# Patient Record
Sex: FEMALE | Race: WHITE | NOT HISPANIC OR LATINO | Employment: FULL TIME | URBAN - METROPOLITAN AREA
[De-identification: names, ages, dates, MRNs, and addresses within clinical notes are randomized per-mention and may not be internally consistent; named-entity substitution may affect disease eponyms.]

---

## 2020-03-19 ENCOUNTER — TELEPHONE (OUTPATIENT)
Dept: OBGYN CLINIC | Facility: CLINIC | Age: 30
End: 2020-03-19

## 2020-03-19 ENCOUNTER — OFFICE VISIT (OUTPATIENT)
Dept: OBGYN CLINIC | Facility: CLINIC | Age: 30
End: 2020-03-19
Payer: COMMERCIAL

## 2020-03-19 VITALS
DIASTOLIC BLOOD PRESSURE: 74 MMHG | HEIGHT: 62 IN | SYSTOLIC BLOOD PRESSURE: 114 MMHG | BODY MASS INDEX: 21.9 KG/M2 | HEART RATE: 82 BPM | WEIGHT: 119 LBS

## 2020-03-19 DIAGNOSIS — Z01.419 ENCNTR FOR GYN EXAM (GENERAL) (ROUTINE) W/O ABN FINDINGS: Primary | ICD-10-CM

## 2020-03-19 DIAGNOSIS — Z30.09 ENCOUNTER FOR COUNSELING REGARDING CONTRACEPTION: ICD-10-CM

## 2020-03-19 PROCEDURE — G0145 SCR C/V CYTO,THINLAYER,RESCR: HCPCS | Performed by: NURSE PRACTITIONER

## 2020-03-19 PROCEDURE — S0610 ANNUAL GYNECOLOGICAL EXAMINA: HCPCS | Performed by: NURSE PRACTITIONER

## 2020-03-19 NOTE — PATIENT INSTRUCTIONS
ap every 3 years if normal-done, STI testing as indicated-decliend, exercise most days of week, obtain appropriate diet and hydration, Calcium 1000mg + 600 vit D daily, birth control options discussed  She will meet with Dr Ko Monday to discuss permanent sterilization   (ACHES reviewed)  Benefits, risks and alternatives discussed/reviewed  Condom use when sexually active for sexually transmitted infection prevention  HPV 9 vaccine recommended through age 39  Check with your insurance for coverage  If covered, call office to schedule start of vaccine series    Annual mammogram starting at age 36, monthly breast self exam

## 2020-03-19 NOTE — TELEPHONE ENCOUNTER
Telephone Note:     Left message on machine for [facility] office  Left message advising of new visitor policy allowing NO support persons for appointment

## 2020-03-19 NOTE — PROGRESS NOTES
Assessment/Plan:     Pap every 3 years if normal-done, STI testing as indicated-decliend, exercise most days of week, obtain appropriate diet and hydration, Calcium 1000mg + 600 vit D daily, birth control options discussed  She will meet with Dr Rafa Bowie (she has also researched all providers)  to discuss permanent sterilization   (ACHES reviewed)  Benefits, risks and alternatives discussed/reviewed  Condom use when sexually active for sexually transmitted infection prevention  HPV 9 vaccine recommended through age 39  Check with your insurance for coverage  If covered, call office to schedule start of vaccine series  Annual mammogram starting at age 36, monthly breast self exam           States slightly down with Covid quarantine  Diagnoses and all orders for this visit:    Encntr for gyn exam (general) (routine) w/o abn findings  -     Liquid-based pap, screening    Encounter for counseling regarding contraception              Subjective:      Patient ID: Eduard Way is a 30 y o  female  Samantha Francisco is a 30 y o  female who is here today for her annual visit/first gynecologic exam  No health concerns and desires to discuss birth control options  States "my whole life I have never wanted kids " She has researched all birth control methods and desires permanent sterilization  States the only way a artem would be Mr Blake Brown is if he says "he only wants to be with me and doesn't want children "  Monthly menses x 5 days with light to mod flow  Menses is acceptable  Not currently exercising  Works FT at a [employer], so walks frequently  Samantha Francisco is not currently sexually active  Admtis to always condom use  Menarche 16         The following portions of the patient's history were reviewed and updated as appropriate: allergies, current medications, past family history, past medical history, past social history, past surgical history and problem list     Review of Systems Constitutional: Negative  Negative for activity change, appetite change, chills, diaphoresis, fatigue, fever and unexpected weight change  HENT: Negative for congestion, dental problem, sneezing, sore throat and trouble swallowing  Eyes: Negative for visual disturbance  Respiratory: Negative for chest tightness and shortness of breath  Cardiovascular: Negative for chest pain and leg swelling  Gastrointestinal: Negative for abdominal pain, constipation, diarrhea, nausea and vomiting  Genitourinary: Negative for difficulty urinating, dyspareunia, dysuria, frequency, hematuria, menstrual problem, pelvic pain, urgency, vaginal bleeding, vaginal discharge and vaginal pain  Musculoskeletal: Negative for back pain and neck pain  Skin: Negative  Allergic/Immunologic: Negative  Neurological: Negative for weakness and headaches  Hematological: Negative for adenopathy  Psychiatric/Behavioral: Negative  Objective:      /74 (BP Location: Left arm, Patient Position: Sitting, Cuff Size: Standard)   Pulse 82   Ht 5' 1 5" (1 562 m)   Wt 54 kg (119 lb)   LMP 03/04/2020   BMI 22 12 kg/m²          Physical Exam   Constitutional: She is oriented to person, place, and time  Vital signs are normal  She appears well-developed and well-nourished  HENT:   Head: Normocephalic and atraumatic  Eyes: Right eye exhibits no discharge  Left eye exhibits no discharge  Neck: Trachea normal and normal range of motion  Neck supple  No thyromegaly present  Cardiovascular: Normal rate, regular rhythm, normal heart sounds and intact distal pulses  Pulmonary/Chest: Effort normal and breath sounds normal  Right breast exhibits no inverted nipple, no mass, no nipple discharge, no skin change and no tenderness  Left breast exhibits no inverted nipple, no mass, no nipple discharge, no skin change and no tenderness  No breast tenderness, discharge or bleeding   Breasts are symmetrical    Abdominal: Soft  Normal appearance  Genitourinary: Vagina normal and uterus normal  Rectal exam shows no external hemorrhoid  No breast tenderness, discharge or bleeding  Pelvic exam was performed with patient supine  No labial fusion  There is no rash, tenderness, lesion or injury on the right labia  There is no rash, tenderness, lesion or injury on the left labia  Cervix exhibits no motion tenderness, no discharge and no friability  Right adnexum displays no mass, no tenderness and no fullness  Left adnexum displays no mass, no tenderness and no fullness  No erythema, tenderness or bleeding in the vagina  No foreign body in the vagina  No signs of injury around the vagina  No vaginal discharge found  Genitourinary Comments: Moderate cervical eversion   Musculoskeletal: Normal range of motion  Lymphadenopathy:        Head (right side): No submental, no submandibular and no tonsillar adenopathy present  Head (left side): No submental, no submandibular and no tonsillar adenopathy present  She has no cervical adenopathy  She has no axillary adenopathy  No inguinal adenopathy noted on the right or left side  Right: No inguinal adenopathy present  Left: No inguinal adenopathy present  Neurological: She is alert and oriented to person, place, and time  Skin: Skin is warm and dry  Psychiatric: She has a normal mood and affect  Nursing note and vitals reviewed

## 2020-03-24 LAB
LAB AP GYN PRIMARY INTERPRETATION: NORMAL
Lab: NORMAL

## 2020-07-16 ENCOUNTER — OFFICE VISIT (OUTPATIENT)
Dept: OBGYN CLINIC | Facility: CLINIC | Age: 30
End: 2020-07-16
Payer: COMMERCIAL

## 2020-07-16 VITALS
WEIGHT: 116.8 LBS | DIASTOLIC BLOOD PRESSURE: 62 MMHG | BODY MASS INDEX: 21.49 KG/M2 | TEMPERATURE: 98.8 F | SYSTOLIC BLOOD PRESSURE: 114 MMHG | HEIGHT: 62 IN

## 2020-07-16 DIAGNOSIS — Z30.09 ENCOUNTER FOR COUNSELING REGARDING CONTRACEPTION: ICD-10-CM

## 2020-07-16 DIAGNOSIS — Z30.09 CONSULTATION FOR FEMALE STERILIZATION: Primary | ICD-10-CM

## 2020-07-16 PROBLEM — Z01.419 ENCNTR FOR GYN EXAM (GENERAL) (ROUTINE) W/O ABN FINDINGS: Status: RESOLVED | Noted: 2020-03-19 | Resolved: 2020-07-16

## 2020-07-16 PROCEDURE — 99214 OFFICE O/P EST MOD 30 MIN: CPT | Performed by: OBSTETRICS & GYNECOLOGY

## 2020-07-16 NOTE — PROGRESS NOTES
Assessment/Plan    Patient has expressed desire for contraception  She does not desire childbearing and would like permanent sterilization  She understands that this is a permanent, irreversible procedure  Future pregnancy would have to be via IVF after this procedure if desired  Will message surgery scheduler to schedule  Plan for laparoscopic bilateral salpingectomy  Briefly discussed surgery, day of, postop recovery, etc  All questions answered  Will return for preop visit  Total face to face time of this appointment was 25 minutes, with >50% of the time spent counseling the patient on treatment options and follow up plan  Subjective      Samantha Cintron is a 30 y o  female who presents for contraception counseling  The patient has no complaints today  The patient is sexually active  Pertinent past medical history: none  The patient strongly desires permanent sterilization and is not interested in discussing alternatives today  Menstrual History:  OB History        0    Para   0    Term   0       0    AB   0    Living   0       SAB   0    TAB   0    Ectopic   0    Multiple   0    Live Births   0                Patient's last menstrual period was 2020 (exact date)  History reviewed  No pertinent past medical history      Past Surgical History:   Procedure Laterality Date    WISDOM TOOTH EXTRACTION           Family History   Problem Relation Age of Onset    Hypertension Mother     Endometrial cancer Mother     Hyperlipidemia Mother     Hypertension Father     Autism Brother     Other Paternal Grandmother         lymes    No Known Problems Paternal Grandfather     No Known Problems Half-Brother     No Known Problems Half-Brother        Social History     Socioeconomic History    Marital status: Single     Spouse name: Not on file    Number of children: Not on file    Years of education: 15    Highest education level: Not on file   Occupational History    Occupation: [employer]   Social Needs    Financial resource strain: Not on file    Food insecurity:     Worry: Not on file     Inability: Not on file   [place] needs:     Medical: Not on file     Non-medical: Not on file   Tobacco Use    Smoking status: Never Smoker    Smokeless tobacco: Never Used   Substance and Sexual Activity    Alcohol use: Yes     Drinks per session: 1 or 2     Binge frequency: Monthly     Comment: occasionally     Drug use: Never    Sexual activity: Not Currently     Partners: Male     Birth control/protection: None   Lifestyle    Physical activity:     Days per week: Not on file     Minutes per session: Not on file    Stress: Not on file   Relationships    Social connections:     Talks on phone: Not on file     Gets together: Not on file     Attends Oriental orthodox service: Not on file     Active member of club or organization: Not on file     Attends meetings of clubs or organizations: Not on file     Relationship status: Not on file    Intimate partner violence:     Fear of current or ex partner: Not on file     Emotionally abused: Not on file     Physically abused: Not on file     Forced sexual activity: Not on file   Other Topics Concern    Not on file   Social History Narrative    Most recent tobacco use screening: 10-    Do you currently or have you served in [employer]: No    Were you activated, into active duty, as a member of the [employer] or as a Reservist: No    Occupation: [employer] [place] [id number]    Education: 15    student college    Marital status: Single    Sexual orientation: Heterosexual    Exercise level: Occasional    Diet: Regular    General stress level: Medium    Alcohol intake: Occasional    Caffeine intake: Occasional    Chewing tobacco: none    Illicit drugs: none    Guns present in home: No    Seat belts used routinely: Yes    Sunscreen used routinely: No    Smoke alarm in home: Yes    Advance directive: No    Salt Intake: moderate    Has the Patient had a mammogram to screen for breast cancer within 24 months: No    Would the patient like to s       Allergies/Medications  No Known Allergies  No current outpatient medications on file  Review of Systems  Denies fevers, chills, shortness of breath, chest pain, abdominal pain, nausea, vomiting, pelvic pain, abnormal vaginal bleeding, abnormal vaginal discharge  Objective      /62   Temp 98 8 °F (37 1 °C)   Ht 5' 1 5" (1 562 m)   Wt 53 kg (116 lb 12 8 oz)   LMP 06/05/2020 (Exact Date)   BMI 21 71 kg/m²     GEN: The patient was alert and oriented x3, pleasant well-appearing female in no acute distress     PULM: nonlabored respirations  MSK: Normal gait  Skin: warm, dry  Neuro: no focal deficits  Psych: normal affect and judgement, cooperative

## 2020-07-28 ENCOUNTER — TELEPHONE (OUTPATIENT)
Dept: OBGYN CLINIC | Facility: CLINIC | Age: 30
End: 2020-07-28

## 2020-07-28 NOTE — TELEPHONE ENCOUNTER
----- Message from Abbey Pugh MA sent at 7/28/2020 10:05 AM EDT -----  Regarding: surgery dates   Left VM for pt to call office back to discuss surgery dates    Can offer Tues 09/01/2020 MAIN 9:45am    following Dr Rosaline Hong other case (hysteroscopy)   Needs pre op scheduled if agreeable     ----- Message -----  From: Justin Alfaro DO  Sent: 7/16/2020   8:19 PM EDT  To: Abbey Pugh MA    [facility],    This patient desires tubal ligation  Can you please peek at the schedule and call her with available times? Would prefer ASC  If we're going into October with dates I'd like to see if I can add her on outside of our block time  Thanks!     Tonia Taylor

## 2020-07-28 NOTE — TELEPHONE ENCOUNTER
Called pts insurance to see if pt needs prior auth for surgery on 09/01/20   Using code  (B/L salpingectomy)        NO PA REQUIRED   Call Ref # [id number], 07/28/20, Agustin Spencer

## 2020-08-21 ENCOUNTER — OFFICE VISIT (OUTPATIENT)
Dept: OBGYN CLINIC | Facility: CLINIC | Age: 30
End: 2020-08-21
Payer: COMMERCIAL

## 2020-08-21 VITALS
BODY MASS INDEX: 21.64 KG/M2 | DIASTOLIC BLOOD PRESSURE: 76 MMHG | SYSTOLIC BLOOD PRESSURE: 112 MMHG | TEMPERATURE: 98 F | WEIGHT: 116.4 LBS

## 2020-08-21 DIAGNOSIS — Z30.09 STERILIZATION CONSULT: ICD-10-CM

## 2020-08-21 DIAGNOSIS — Z01.818 PREOP EXAMINATION: Primary | ICD-10-CM

## 2020-08-21 PROCEDURE — 99213 OFFICE O/P EST LOW 20 MIN: CPT | Performed by: OBSTETRICS & GYNECOLOGY

## 2020-08-21 NOTE — PROGRESS NOTES
Assessment Samantha was seen today for pre-op exam     Diagnoses and all orders for this visit:    Preop examination    Sterilization consult         Plan    Samantha Franecs is scheduled for laparoscopic bilateral salpingectomy on 2020  Pre-op instructions, including showering with Hibiclens, discussed with patient  The risks, benefits and alternatives to the procedure were discussed  We discussed the risks of pain, bleeding, blood clot, infection, allergic reaction, neurovascular injury, injury to uterus, injury to surrounding structures such as bowel, bladder and/or ureters, and possibility of inability to complete the procedure  We discussed code status - full code  Blood transfusion is acceptable  We discussed resident physician participation in the procedure, including pelvic exam   All questions answered, consent obtained  Subjective     Samantha Frances is a 35 y o  female here for a pre-op consultation  She is scheduled for sterilization  She has no questions today  Patient Active Problem List   Diagnosis   (none) - all problems resolved or deleted         Gynecologic History  No LMP recorded  Obstetric History  OB History    Para Term  AB Living   0 0 0 0 0 0   SAB TAB Ectopic Multiple Live Births   0 0 0 0 0       Past Medical/Surgical/Family/Social History    No past medical history on file    Past Surgical History:   Procedure Laterality Date    WISDOM TOOTH EXTRACTION       Family History   Problem Relation Age of Onset    Hypertension Mother     Endometrial cancer Mother     Hyperlipidemia Mother     Hypertension Father     Autism Brother     Other Paternal Grandmother         lymes    No Known Problems Paternal Grandfather     No Known Problems Half-Brother     No Known Problems Half-Brother      Social History     Socioeconomic History    Marital status: Single     Spouse name: Not on file    Number of children: Not on file    Years of education: 15    Highest education level: Not on file   Occupational History    Occupation: [employer]   Social [employer]    Financial resource strain: Not on file    Food insecurity     Worry: Not on file     Inability: Not on file   [employer] needs     Medical: Not on file     Non-medical: Not on file   Tobacco Use    Smoking status: Never Smoker    Smokeless tobacco: Never Used   Substance and Sexual Activity    Alcohol use: Yes     Drinks per session: 1 or 2     Binge frequency: Monthly     Comment: occasionally     Drug use: Never    Sexual activity: Not Currently     Partners: Male     Birth control/protection: None   Lifestyle    Physical activity     Days per week: Not on file     Minutes per session: Not on file    Stress: Not on file   Relationships    Social connections     Talks on phone: Not on file     Gets together: Not on file     Attends Faith service: Not on file     Active member of club or organization: Not on file     Attends meetings of clubs or organizations: Not on file     Relationship status: Not on file    Intimate partner violence     Fear of current or ex partner: Not on file     Emotionally abused: Not on file     Physically abused: Not on file     Forced sexual activity: Not on file   Other Topics Concern    Not on file   Social History Narrative    Most recent tobacco use screening: 10-    Do you currently or have you served in [employer]: No    Were you activated, into active duty, as a member of the [employer] or as a Reservist: No    Occupation: [employer] [place] [id number] [place]    Education: 15    student college    Marital status: Single    Sexual orientation: Heterosexual    Exercise level: Occasional    Diet: Regular    General stress level: Medium    Alcohol intake: Occasional    Caffeine intake: Occasional    Chewing tobacco: none    Illicit drugs: none    Guns present in home: No    Seat belts used routinely: Yes    Sunscreen used routinely: No    Smoke alarm in home:  Yes Advance directive: No    Salt Intake: moderate    Has the Patient had a mammogram to screen for breast cancer within 24 months: No    Would the patient like to s         Patient has no known allergies  No current outpatient medications on file  Review of Systems  Constitutional: no fever, feels well, no tiredness, no recent weight gain or loss  Gastrointestinal: no complaints of abdominal pain, constipation, nausea, vomiting, or diarrhea or bloody stools  Genitourinary : no complaints of dysuria, incontinence, pelvic pain, dysmenorrhea,vaginal discharge or abnormal vaginal bleeding       Objective     /76   Temp 98 °F (36 7 °C)   Wt 52 8 kg (116 lb 6 4 oz)   BMI 21 64 kg/m²     GEN: The patient was alert and oriented x3, pleasant well-appearing female in no acute distress     CV: Regular rate and rhythm  PULM: nonlabored respirations, CTAB  MSK: Normal gait  Skin: warm, dry  Neuro: no focal deficits  Psych: normal affect and judgement, cooperative

## 2020-08-21 NOTE — H&P (VIEW-ONLY)
Assessment Samantha was seen today for pre-op exam     Diagnoses and all orders for this visit:    Preop examination    Sterilization consult         Plan    Samantha Ramos is scheduled for laparoscopic bilateral salpingectomy on 2020  Pre-op instructions, including showering with Hibiclens, discussed with patient  The risks, benefits and alternatives to the procedure were discussed  We discussed the risks of pain, bleeding, blood clot, infection, allergic reaction, neurovascular injury, injury to uterus, injury to surrounding structures such as bowel, bladder and/or ureters, and possibility of inability to complete the procedure  We discussed code status - full code  Blood transfusion is acceptable  We discussed resident physician participation in the procedure, including pelvic exam   All questions answered, consent obtained  Subjective     Samantha Ramos is a 35 y o  female here for a pre-op consultation  She is scheduled for sterilization  She has no questions today  Patient Active Problem List   Diagnosis   (none) - all problems resolved or deleted         Gynecologic History  No LMP recorded  Obstetric History  OB History    Para Term  AB Living   0 0 0 0 0 0   SAB TAB Ectopic Multiple Live Births   0 0 0 0 0       Past Medical/Surgical/Family/Social History    No past medical history on file    Past Surgical History:   Procedure Laterality Date    WISDOM TOOTH EXTRACTION       Family History   Problem Relation Age of Onset    Hypertension Mother     Endometrial cancer Mother     Hyperlipidemia Mother     Hypertension Father     Autism Brother     Other Paternal Grandmother         lymes    No Known Problems Paternal Grandfather     No Known Problems Half-Brother     No Known Problems Half-Brother      Social History     Socioeconomic History    Marital status: Single     Spouse name: Not on file    Number of children: Not on file    Years of education: 15    Highest education level: Not on file   Occupational History    Occupation: [employer]   Social [employer]    Financial resource strain: Not on file    Food insecurity     Worry: Not on file     Inability: Not on file   [employer] needs     Medical: Not on file     Non-medical: Not on file   Tobacco Use    Smoking status: Never Smoker    Smokeless tobacco: Never Used   Substance and Sexual Activity    Alcohol use: Yes     Drinks per session: 1 or 2     Binge frequency: Monthly     Comment: occasionally     Drug use: Never    Sexual activity: Not Currently     Partners: Male     Birth control/protection: None   Lifestyle    Physical activity     Days per week: Not on file     Minutes per session: Not on file    Stress: Not on file   Relationships    Social connections     Talks on phone: Not on file     Gets together: Not on file     Attends Orthodoxy service: Not on file     Active member of club or organization: Not on file     Attends meetings of clubs or organizations: Not on file     Relationship status: Not on file    Intimate partner violence     Fear of current or ex partner: Not on file     Emotionally abused: Not on file     Physically abused: Not on file     Forced sexual activity: Not on file   Other Topics Concern    Not on file   Social History Narrative    Most recent tobacco use screening: 10-    Do you currently or have you served in [employer]: No    Were you activated, into active duty, as a member of the [employer] or as a Reservist: No    Occupation: [employer] [place] [id number] [place]    Education: 15    student college    Marital status: Single    Sexual orientation: Heterosexual    Exercise level: Occasional    Diet: Regular    General stress level: Medium    Alcohol intake: Occasional    Caffeine intake: Occasional    Chewing tobacco: none    Illicit drugs: none    Guns present in home: No    Seat belts used routinely: Yes    Sunscreen used routinely: No    Smoke alarm in home:  Yes Advance directive: No    Salt Intake: moderate    Has the Patient had a mammogram to screen for breast cancer within 24 months: No    Would the patient like to s         Patient has no known allergies  No current outpatient medications on file  Review of Systems  Constitutional: no fever, feels well, no tiredness, no recent weight gain or loss  Gastrointestinal: no complaints of abdominal pain, constipation, nausea, vomiting, or diarrhea or bloody stools  Genitourinary : no complaints of dysuria, incontinence, pelvic pain, dysmenorrhea,vaginal discharge or abnormal vaginal bleeding       Objective     /76   Temp 98 °F (36 7 °C)   Wt 52 8 kg (116 lb 6 4 oz)   BMI 21 64 kg/m²     GEN: The patient was alert and oriented x3, pleasant well-appearing female in no acute distress     CV: Regular rate and rhythm  PULM: nonlabored respirations, CTAB  MSK: Normal gait  Skin: warm, dry  Neuro: no focal deficits  Psych: normal affect and judgement, cooperative

## 2020-08-28 ENCOUNTER — APPOINTMENT (OUTPATIENT)
Dept: LAB | Facility: CLINIC | Age: 30
End: 2020-08-28
Payer: COMMERCIAL

## 2020-08-28 DIAGNOSIS — Z30.2 ENCOUNTER FOR STERILIZATION: ICD-10-CM

## 2020-08-28 LAB
ERYTHROCYTE [DISTWIDTH] IN BLOOD BY AUTOMATED COUNT: 14.1 % (ref 11.6–15.1)
HCT VFR BLD AUTO: 38.9 % (ref 34.8–46.1)
HGB BLD-MCNC: 11.7 G/DL (ref 11.5–15.4)
MCH RBC QN AUTO: 26.6 PG (ref 26.8–34.3)
MCHC RBC AUTO-ENTMCNC: 30.1 G/DL (ref 31.4–37.4)
MCV RBC AUTO: 88 FL (ref 82–98)
PLATELET # BLD AUTO: 265 THOUSANDS/UL (ref 149–390)
PMV BLD AUTO: 11.8 FL (ref 8.9–12.7)
RBC # BLD AUTO: 4.4 MILLION/UL (ref 3.81–5.12)
WBC # BLD AUTO: 5.6 THOUSAND/UL (ref 4.31–10.16)

## 2020-08-28 PROCEDURE — 36415 COLL VENOUS BLD VENIPUNCTURE: CPT

## 2020-08-28 PROCEDURE — 85027 COMPLETE CBC AUTOMATED: CPT

## 2020-08-31 ENCOUNTER — ANESTHESIA EVENT (OUTPATIENT)
Dept: PERIOP | Facility: HOSPITAL | Age: 30
End: 2020-08-31
Payer: COMMERCIAL

## 2020-08-31 NOTE — ANESTHESIA PREPROCEDURE EVALUATION
Procedure:  LAPAROSCOPIC SALPINGECTOMY (Bilateral Abdomen)    Relevant Problems   No relevant active problems        Physical Exam    Airway    Mallampati score: III  TM Distance: >3 FB  Neck ROM: full     Dental   No notable dental hx     Cardiovascular      Pulmonary      Other Findings        Anesthesia Plan  ASA Score- 1     Anesthesia Type- general with ASA Monitors  Additional Monitors:   Airway Plan: ETT  Plan Factors-Exercise tolerance (METS): >4 METS  Chart reviewed  Existing labs reviewed  Patient summary reviewed  Induction- intravenous  Postoperative Plan- Plan for postoperative opioid use  Informed Consent- Anesthetic plan and risks discussed with patient  I personally reviewed this patient with the CRNA  Discussed and agreed on the Anesthesia Plan with the CRNA  Sushila Cardoza

## 2020-08-31 NOTE — PRE-PROCEDURE INSTRUCTIONS
No outpatient medications have been marked as taking for the 9/1/20 encounter [facility] Encounter)  Pre op and bathing instructions reviewed   Pt will use antibacterial soap DOS

## 2020-09-01 ENCOUNTER — HOSPITAL ENCOUNTER (OUTPATIENT)
Facility: HOSPITAL | Age: 30
Setting detail: OUTPATIENT SURGERY
Discharge: HOME | End: 2020-09-01
Attending: OBSTETRICS & GYNECOLOGY | Admitting: OBSTETRICS & GYNECOLOGY
Payer: COMMERCIAL

## 2020-09-01 ENCOUNTER — ANESTHESIA (OUTPATIENT)
Dept: PERIOP | Facility: HOSPITAL | Age: 30
End: 2020-09-01
Payer: COMMERCIAL

## 2020-09-01 VITALS
RESPIRATION RATE: 18 BRPM | BODY MASS INDEX: 21.14 KG/M2 | OXYGEN SATURATION: 100 % | HEART RATE: 74 BPM | WEIGHT: 112 LBS | TEMPERATURE: 97.5 F | SYSTOLIC BLOOD PRESSURE: 110 MMHG | HEIGHT: 61 IN | DIASTOLIC BLOOD PRESSURE: 75 MMHG

## 2020-09-01 DIAGNOSIS — Z30.2 ENCOUNTER FOR STERILIZATION: ICD-10-CM

## 2020-09-01 DIAGNOSIS — Z98.890 S/P LAPAROSCOPY: Primary | ICD-10-CM

## 2020-09-01 LAB
EXT PREGNANCY TEST URINE: NEGATIVE
EXT. CONTROL: NORMAL
GLUCOSE SERPL-MCNC: 97 MG/DL (ref 65–140)

## 2020-09-01 PROCEDURE — 82948 REAGENT STRIP/BLOOD GLUCOSE: CPT

## 2020-09-01 PROCEDURE — 81025 URINE PREGNANCY TEST: CPT | Performed by: OBSTETRICS & GYNECOLOGY

## 2020-09-01 PROCEDURE — 88302 TISSUE EXAM BY PATHOLOGIST: CPT | Performed by: PATHOLOGY

## 2020-09-01 PROCEDURE — 58661 LAPAROSCOPY REMOVE ADNEXA: CPT | Performed by: OBSTETRICS & GYNECOLOGY

## 2020-09-01 RX ORDER — NEOSTIGMINE METHYLSULFATE 1 MG/ML
INJECTION INTRAVENOUS AS NEEDED
Status: DISCONTINUED | OUTPATIENT
Start: 2020-09-01 | End: 2020-09-01

## 2020-09-01 RX ORDER — GLYCOPYRROLATE 0.2 MG/ML
INJECTION INTRAMUSCULAR; INTRAVENOUS AS NEEDED
Status: DISCONTINUED | OUTPATIENT
Start: 2020-09-01 | End: 2020-09-01

## 2020-09-01 RX ORDER — LIDOCAINE HYDROCHLORIDE 10 MG/ML
INJECTION, SOLUTION EPIDURAL; INFILTRATION; INTRACAUDAL; PERINEURAL AS NEEDED
Status: DISCONTINUED | OUTPATIENT
Start: 2020-09-01 | End: 2020-09-01

## 2020-09-01 RX ORDER — DEXAMETHASONE SODIUM PHOSPHATE 10 MG/ML
INJECTION, SOLUTION INTRAMUSCULAR; INTRAVENOUS AS NEEDED
Status: DISCONTINUED | OUTPATIENT
Start: 2020-09-01 | End: 2020-09-01

## 2020-09-01 RX ORDER — SODIUM CHLORIDE, SODIUM LACTATE, POTASSIUM CHLORIDE, CALCIUM CHLORIDE 600; 310; 30; 20 MG/100ML; MG/100ML; MG/100ML; MG/100ML
20 INJECTION, SOLUTION INTRAVENOUS CONTINUOUS
Status: CANCELLED | OUTPATIENT
Start: 2020-09-01

## 2020-09-01 RX ORDER — ROCURONIUM BROMIDE 10 MG/ML
INJECTION, SOLUTION INTRAVENOUS AS NEEDED
Status: DISCONTINUED | OUTPATIENT
Start: 2020-09-01 | End: 2020-09-01

## 2020-09-01 RX ORDER — OXYCODONE HYDROCHLORIDE 5 MG/1
5 TABLET ORAL EVERY 4 HOURS PRN
Status: DISCONTINUED | OUTPATIENT
Start: 2020-09-01 | End: 2020-09-01 | Stop reason: HOSPADM

## 2020-09-01 RX ORDER — MAGNESIUM HYDROXIDE 1200 MG/15ML
LIQUID ORAL AS NEEDED
Status: DISCONTINUED | OUTPATIENT
Start: 2020-09-01 | End: 2020-09-01 | Stop reason: HOSPADM

## 2020-09-01 RX ORDER — SUCCINYLCHOLINE/SOD CL,ISO/PF 100 MG/5ML
SYRINGE (ML) INTRAVENOUS AS NEEDED
Status: DISCONTINUED | OUTPATIENT
Start: 2020-09-01 | End: 2020-09-01

## 2020-09-01 RX ORDER — IBUPROFEN 600 MG/1
600 TABLET ORAL EVERY 6 HOURS PRN
Qty: 30 TABLET | Refills: 0 | Status: SHIPPED | OUTPATIENT
Start: 2020-09-01 | End: 2021-01-06

## 2020-09-01 RX ORDER — BUPIVACAINE HYDROCHLORIDE 2.5 MG/ML
INJECTION, SOLUTION EPIDURAL; INFILTRATION; INTRACAUDAL AS NEEDED
Status: DISCONTINUED | OUTPATIENT
Start: 2020-09-01 | End: 2020-09-01 | Stop reason: HOSPADM

## 2020-09-01 RX ORDER — HYDROMORPHONE HCL/PF 1 MG/ML
0.2 SYRINGE (ML) INJECTION
Status: DISCONTINUED | OUTPATIENT
Start: 2020-09-01 | End: 2020-09-01 | Stop reason: HOSPADM

## 2020-09-01 RX ORDER — ONDANSETRON 2 MG/ML
4 INJECTION INTRAMUSCULAR; INTRAVENOUS EVERY 6 HOURS PRN
Status: DISCONTINUED | OUTPATIENT
Start: 2020-09-01 | End: 2020-09-01 | Stop reason: HOSPADM

## 2020-09-01 RX ORDER — ACETAMINOPHEN 325 MG/1
650 TABLET ORAL EVERY 6 HOURS PRN
Status: DISCONTINUED | OUTPATIENT
Start: 2020-09-01 | End: 2020-09-01 | Stop reason: HOSPADM

## 2020-09-01 RX ORDER — ONDANSETRON 2 MG/ML
4 INJECTION INTRAMUSCULAR; INTRAVENOUS ONCE AS NEEDED
Status: DISCONTINUED | OUTPATIENT
Start: 2020-09-01 | End: 2020-09-01 | Stop reason: HOSPADM

## 2020-09-01 RX ORDER — IBUPROFEN 600 MG/1
600 TABLET ORAL EVERY 6 HOURS PRN
Status: DISCONTINUED | OUTPATIENT
Start: 2020-09-01 | End: 2020-09-01 | Stop reason: HOSPADM

## 2020-09-01 RX ORDER — SODIUM CHLORIDE, SODIUM LACTATE, POTASSIUM CHLORIDE, CALCIUM CHLORIDE 600; 310; 30; 20 MG/100ML; MG/100ML; MG/100ML; MG/100ML
125 INJECTION, SOLUTION INTRAVENOUS CONTINUOUS
Status: DISCONTINUED | OUTPATIENT
Start: 2020-09-01 | End: 2020-09-01

## 2020-09-01 RX ORDER — OXYCODONE HYDROCHLORIDE AND ACETAMINOPHEN 5; 325 MG/1; MG/1
1 TABLET ORAL EVERY 6 HOURS PRN
Qty: 5 TABLET | Refills: 0 | Status: SHIPPED | OUTPATIENT
Start: 2020-09-01 | End: 2020-09-11

## 2020-09-01 RX ORDER — MIDAZOLAM HYDROCHLORIDE 2 MG/2ML
INJECTION, SOLUTION INTRAMUSCULAR; INTRAVENOUS AS NEEDED
Status: DISCONTINUED | OUTPATIENT
Start: 2020-09-01 | End: 2020-09-01

## 2020-09-01 RX ORDER — ONDANSETRON 2 MG/ML
INJECTION INTRAMUSCULAR; INTRAVENOUS AS NEEDED
Status: DISCONTINUED | OUTPATIENT
Start: 2020-09-01 | End: 2020-09-01

## 2020-09-01 RX ORDER — PROPOFOL 10 MG/ML
INJECTION, EMULSION INTRAVENOUS AS NEEDED
Status: DISCONTINUED | OUTPATIENT
Start: 2020-09-01 | End: 2020-09-01

## 2020-09-01 RX ORDER — SODIUM CHLORIDE, SODIUM LACTATE, POTASSIUM CHLORIDE, CALCIUM CHLORIDE 600; 310; 30; 20 MG/100ML; MG/100ML; MG/100ML; MG/100ML
125 INJECTION, SOLUTION INTRAVENOUS CONTINUOUS
Status: CANCELLED | OUTPATIENT
Start: 2020-09-01

## 2020-09-01 RX ORDER — FENTANYL CITRATE 50 UG/ML
INJECTION, SOLUTION INTRAMUSCULAR; INTRAVENOUS AS NEEDED
Status: DISCONTINUED | OUTPATIENT
Start: 2020-09-01 | End: 2020-09-01

## 2020-09-01 RX ORDER — KETOROLAC TROMETHAMINE 30 MG/ML
INJECTION, SOLUTION INTRAMUSCULAR; INTRAVENOUS AS NEEDED
Status: DISCONTINUED | OUTPATIENT
Start: 2020-09-01 | End: 2020-09-01

## 2020-09-01 RX ADMIN — ONDANSETRON 4 MG: 2 INJECTION INTRAMUSCULAR; INTRAVENOUS at 09:59

## 2020-09-01 RX ADMIN — PROPOFOL 120 MG: 10 INJECTION, EMULSION INTRAVENOUS at 09:39

## 2020-09-01 RX ADMIN — HYDROMORPHONE HYDROCHLORIDE 0.2 MG: 1 INJECTION, SOLUTION INTRAMUSCULAR; INTRAVENOUS; SUBCUTANEOUS at 11:12

## 2020-09-01 RX ADMIN — MIDAZOLAM HYDROCHLORIDE 2 MG: 1 INJECTION, SOLUTION INTRAMUSCULAR; INTRAVENOUS at 09:34

## 2020-09-01 RX ADMIN — SODIUM CHLORIDE, SODIUM LACTATE, POTASSIUM CHLORIDE, AND CALCIUM CHLORIDE: .6; .31; .03; .02 INJECTION, SOLUTION INTRAVENOUS at 10:30

## 2020-09-01 RX ADMIN — NEOSTIGMINE METHYLSULFATE 3 MG: 1 INJECTION INTRAVENOUS at 10:32

## 2020-09-01 RX ADMIN — GLYCOPYRROLATE 0.4 MG: 0.2 INJECTION, SOLUTION INTRAMUSCULAR; INTRAVENOUS at 10:32

## 2020-09-01 RX ADMIN — ROCURONIUM BROMIDE 5 MG: 10 SOLUTION INTRAVENOUS at 09:40

## 2020-09-01 RX ADMIN — Medication 60 MG: at 09:40

## 2020-09-01 RX ADMIN — ROCURONIUM BROMIDE 15 MG: 10 SOLUTION INTRAVENOUS at 10:09

## 2020-09-01 RX ADMIN — ROCURONIUM BROMIDE 20 MG: 10 SOLUTION INTRAVENOUS at 09:49

## 2020-09-01 RX ADMIN — LIDOCAINE HYDROCHLORIDE 50 MG: 10 INJECTION, SOLUTION EPIDURAL; INFILTRATION; INTRACAUDAL; PERINEURAL at 09:39

## 2020-09-01 RX ADMIN — FENTANYL CITRATE 50 MCG: 50 INJECTION INTRAMUSCULAR; INTRAVENOUS at 10:11

## 2020-09-01 RX ADMIN — ACETAMINOPHEN 650 MG: 325 TABLET, FILM COATED ORAL at 12:06

## 2020-09-01 RX ADMIN — FENTANYL CITRATE 50 MCG: 50 INJECTION INTRAMUSCULAR; INTRAVENOUS at 09:39

## 2020-09-01 RX ADMIN — SODIUM CHLORIDE, SODIUM LACTATE, POTASSIUM CHLORIDE, AND CALCIUM CHLORIDE: .6; .31; .03; .02 INJECTION, SOLUTION INTRAVENOUS at 09:33

## 2020-09-01 RX ADMIN — HYDROMORPHONE HYDROCHLORIDE 0.2 MG: 1 INJECTION, SOLUTION INTRAMUSCULAR; INTRAVENOUS; SUBCUTANEOUS at 11:17

## 2020-09-01 RX ADMIN — KETOROLAC TROMETHAMINE 30 MG: 30 INJECTION, SOLUTION INTRAMUSCULAR at 10:32

## 2020-09-01 RX ADMIN — DEXAMETHASONE SODIUM PHOSPHATE 4 MG: 10 INJECTION, SOLUTION INTRAMUSCULAR; INTRAVENOUS at 09:59

## 2020-09-01 RX ADMIN — SUGAMMADEX 102 MG: 100 INJECTION, SOLUTION INTRAVENOUS at 10:48

## 2020-09-01 NOTE — ANESTHESIA POSTPROCEDURE EVALUATION
Post-Op Assessment Note    CV Status:  Stable  Pain Score: 0    Pain management: adequate     Mental Status:  Alert and awake   Hydration Status:  Euvolemic   PONV Controlled:  Controlled   Airway Patency:  Patent      Post Op Vitals Reviewed: Yes      Staff: CRNA, Anesthesiologist         No complications documented      BP   112/73   Temp  97 1   Pulse  90   Resp   12   SpO2 100

## 2020-09-01 NOTE — INTERVAL H&P NOTE
H&P reviewed  After examining the patient I find no changes in the patients condition since the H&P had been written      Vitals:    09/01/20 0849   BP: 123/85   Pulse: 93   Resp: 18   Temp: (!) 97 3 °F (36 3 °C)   SpO2: 99%

## 2020-09-01 NOTE — DISCHARGE INSTRUCTIONS
Salpingectomy   WHAT YOU NEED TO KNOW:   A salpingectomy is surgery to remove one or both of your fallopian tubes  The fallopian tubes carry eggs from the ovaries to the uterus  They are part of a woman's reproductive system  A salpingectomy may be done to treat an ectopic pregnancy, cancer, endometriosis, or an infection  It may also be done to prevent pregnancy or some types of cancer  DISCHARGE INSTRUCTIONS:   Call 911 for any of the following:   · You feel lightheaded, short of breath, and have chest pain  · You cough up blood  · You have trouble breathing  Seek care immediately if:   · Your arm or leg feels warm, tender, and painful  It may look swollen and red  · Blood soaks through your bandage  · Your stitches come apart  · You soak through 1 sanitary pad in 1 hour  · You have trouble urinating or cannot urinate at all  Contact your healthcare provider if:   · You have a fever or chills  · Your wound is red, swollen, or draining pus  · You have pus or a foul-smelling odor coming from your vagina  · Your pain does not get better after you take your medicine  · You have nausea or are vomiting  · Your skin is itchy, swollen, or you have a rash  · You have questions or concerns about your condition or care  Medicines: You may need any of the following:  · Prescription pain medicine  may be given  Ask your healthcare provider how to take this medicine safely  · NSAIDs , such as ibuprofen, help decrease swelling, pain, and fever  NSAIDs can cause stomach bleeding or kidney problems in certain people  If you take blood thinner medicine, always ask your healthcare provider if NSAIDs are safe for you  Always read the medicine label and follow directions  · Take your medicine as directed  Contact your healthcare provider if you think your medicine is not helping or if you have side effects  Tell him or her if you are allergic to any medicine   Keep a list of the medicines, vitamins, and herbs you take  Include the amounts, and when and why you take them  Bring the list or the pill bottles to follow-up visits  Carry your medicine list with you in case of an emergency  Care for your wound as directed:  Ask your healthcare provider when your wound can get wet  Do not take a bath until your healthcare provider says it is okay  Take a shower only  Carefully wash around the wound with soap and water  Let the soap and water gently run over your incision  Do not  scrub your incision  Dry the area and put on new, clean bandages as directed  Change your bandages when they get wet or dirty  If you have strips of medical tape, let them fall off on their own  Activity:  Ask your healthcare provider when you can return to your normal activities  Do not douche, use tampons, or have sex until your healthcare provider says it is okay  These activities may cause infection  Do not exercise or lift anything heavy until your healthcare provider says it is okay  This may put too much stress on your incision  Follow up with your healthcare provider as directed:  Write down your questions so you remember to ask them during your visits  © 2017 [facility] Information is for End User's use only and may not be sold, redistributed or otherwise used for commercial purposes  All illustrations and images included in CareNotes® are the copyrighted property of [employer] , [employer]  or [employer]  The above information is an  only  It is not intended as medical advice for individual conditions or treatments  Talk to your doctor, nurse or pharmacist before following any medical regimen to see if it is safe and effective for you

## 2020-09-01 NOTE — OP NOTE
OPERATIVE REPORT  PATIENT NAME: Rika Hull    :  1990  MRN: [id number]  Pt Location: [facility]    SURGERY DATE: 2020    Surgeon(s) and Role:     * Car Fowler DO - Primary     * Stas Villa MD - Assisting    Preop Diagnosis:  Encounter for sterilization [Z30 2]    Post-Op Diagnosis Codes:     * Encounter for sterilization [Z30 2]    Procedure(s) (LRB):  LAPAROSCOPIC SALPINGECTOMY (Bilateral)    Specimen(s):  ID Type Source Tests Collected by Time Destination   1 : BILATERAL FALLOPIAN TUBES Tissue Fallopian Tubes, Bilateral TISSUE EXAM Car Fowler DO 2020 1011        Estimated Blood Loss:   Minimal    Drains:  [REMOVED] Urethral Catheter Non-latex 16 Fr  (Removed)   Number of days: 0       Anesthesia Type:   General LMA    Operative Indications:  Encounter for sterilization [Z30 2]    Operative Findings:  Normal uterus  Bilateral tubes and ovaries  Cervical ectropion    Complications:   None    Procedure and Technique:  Description of Procedure    Patient was taken to the operating room  General endotracheal anesthesia (GET) was administered and the patient was positioned on the OR table in the dorsal lithotomy position  All pressure points were padded and a everardo hugger was placed to maintain control of core body temperature  A bimanual exam was performed and the uterus was noted to be anteverted, normal in size and consistency with no palpable adnexal masses or fullness  The patient was prepped and draped in the usual sterile fashion with chloroprep on the abdomen and chlorhexidine prep on the vagina and perineum  Operative Technique    A time out was performed to confirm correct patient and correct procedure  A Landin catheter was introduced into the bladder, and was draining clear yellow urine  A weighted speculum was inserted into the vagina and used to visualize the anterior lip of the cervix, which was then grasped with a single toothed tenaculum   A cone uterine manipulator was inserted into the cervix and secured to the tenaculum  The speculum was removed from the vagina  Sterile gloves were then exchanged and attention was turned to the abdomen  A 5mm incision was made at the inferior edge of the umbilicus for introduction of a 5mm trocar  Trocar was introduced under direct visualization  Pneumoperitoneum was then established to a maximum of 15mmHg  The entire abdomen and pelvis was inspected and there was no evidence of injury to bowel, bladder, vasculature, or other structures  Attention was then turned to the pelvis  Patient was placed in Trendelenburg and the uterus was elevated to visualize the fallopian tubes  There was noted to be grossly normal tubes and ovaries bilaterally  Two additional port sites were selected in the left and right lower abdomen approximately 2cm superior and medial to the iliac crests  A 5mm incision was made for introduction of a 5mm trocar under direct visualization at each site  A blunt grasper was inserted through this port and used to visualize the fimbriated ends of the tubes  The right fallopian tube was grasped at its fimbriated end with a blunt grasper and elevated to visualize the mesosalpinx  The Enseal device was used to ligate along the mesosalpinx, working proximally and taking care to avoid ovarian vasculature  Approximately 2cm from the cornua, the Enseal was used to amputate fallopian tube  This was then withdrawn from the abdominal cavity and sent for pathology  Attention was then turned to the contralateral tube, which was amputated in similar fashion  Good hemostasis was confirmed following salpingectomy  Following salpingectomy, pneumoperitoneum was allowed to escape  Adequate hemostasis was visualized  The inferior trocars were removed under direct visualization  The laparoscope was withdrawn from the abdomen, followed by its trocar sleeve at the umbilicus   Skin incisions were closed with running absorbable suture of 4-0 monocryl  Attention was turned to the vagina  A weighted speculum was reinserted into the vagina and the uterine manipulator was withdrawn  Single toothed tenaculum was removed from the anterior lip of the cervix  Good hemostasis was confirmed at the tenaculum puncture sites  Speculum was then removed from the vagina  At the conclusion of the procedure, all needle, sponge, and instrument counts were noted to be correct x2  Patient tolerated the procedure well and was transferred to PACU in stable condition prior to discharge with follow up in 1-2 weeks  Dr Kory Bhatti was present and participated in all key portions of the case      Patient Disposition:  PACU     SIGNATURE: Jeffry Camacho MD  DATE: September 1, 2020  TIME: 10:52 AM

## 2020-10-09 ENCOUNTER — OFFICE VISIT (OUTPATIENT)
Dept: OBGYN CLINIC | Facility: CLINIC | Age: 30
End: 2020-10-09

## 2020-10-09 VITALS
BODY MASS INDEX: 22.3 KG/M2 | TEMPERATURE: 97.8 F | SYSTOLIC BLOOD PRESSURE: 110 MMHG | WEIGHT: 118 LBS | DIASTOLIC BLOOD PRESSURE: 66 MMHG

## 2020-10-09 DIAGNOSIS — Z98.51 S/P TUBAL LIGATION: Primary | ICD-10-CM

## 2020-10-09 PROCEDURE — 99024 POSTOP FOLLOW-UP VISIT: CPT | Performed by: OBSTETRICS & GYNECOLOGY

## 2021-01-06 ENCOUNTER — OFFICE VISIT (OUTPATIENT)
Dept: FAMILY MEDICINE CLINIC | Facility: CLINIC | Age: 31
End: 2021-01-06
Payer: COMMERCIAL

## 2021-01-06 VITALS
BODY MASS INDEX: 22.09 KG/M2 | HEIGHT: 61 IN | HEART RATE: 100 BPM | OXYGEN SATURATION: 99 % | RESPIRATION RATE: 16 BRPM | WEIGHT: 117 LBS | SYSTOLIC BLOOD PRESSURE: 118 MMHG | DIASTOLIC BLOOD PRESSURE: 68 MMHG

## 2021-01-06 DIAGNOSIS — Z13.6 SCREENING FOR CARDIOVASCULAR CONDITION: ICD-10-CM

## 2021-01-06 DIAGNOSIS — G47.00 INSOMNIA, UNSPECIFIED TYPE: Primary | ICD-10-CM

## 2021-01-06 DIAGNOSIS — F41.9 ANXIETY: ICD-10-CM

## 2021-01-06 DIAGNOSIS — Z23 NEEDS FLU SHOT: ICD-10-CM

## 2021-01-06 PROCEDURE — 99214 OFFICE O/P EST MOD 30 MIN: CPT | Performed by: FAMILY MEDICINE

## 2021-01-06 PROCEDURE — 3725F SCREEN DEPRESSION PERFORMED: CPT | Performed by: FAMILY MEDICINE

## 2021-01-06 PROCEDURE — 90686 IIV4 VACC NO PRSV 0.5 ML IM: CPT | Performed by: FAMILY MEDICINE

## 2021-01-06 PROCEDURE — 90471 IMMUNIZATION ADMIN: CPT | Performed by: FAMILY MEDICINE

## 2021-01-06 RX ORDER — BUSPIRONE HYDROCHLORIDE 7.5 MG/1
TABLET ORAL
Qty: 30 TABLET | Refills: 0 | Status: SHIPPED | OUTPATIENT
Start: 2021-01-06 | End: 2021-01-17

## 2021-01-06 NOTE — PROGRESS NOTES
Assessment/Plan:    Problem List Items Addressed This Visit        Other    Screening for cardiovascular condition    Relevant Orders    CBC and differential    Comprehensive metabolic panel    Lipid panel    TSH, 3rd generation with Free T4 reflex    Insomnia - Primary     Will start her on BuSpar just to help reduce anxiety and maybe she be able to sleep better, advised to have follow-up in 1 week         Relevant Medications    busPIRone (BUSPAR) 7 5 mg tablet    Other Relevant Orders    Ambulatory referral to [id number]     Abuse per will start at bedtime and then she can take twice a day if she feels during daytime she still feel anxious and follow-up 1 week         Relevant Medications    busPIRone (BUSPAR) 7 5 mg tablet    Other Relevant Orders    Ambulatory referral to [facility]    Needs flu shot    Relevant Orders    influenza vaccine, quadrivalent, 0 5 mL, preservative-free, for adult and pediatric patients 6 mos+ (AFLURIA, FLUARIX, FLULAVAL, FLUZONE) (Completed)          Chief Complaint   Patient presents with    Insomnia       Subjective:   Patient ID: Angeli Hathaway is a 35 y o  female  She says she has a long history of intermittent anxiety and insomnia, but in last few days is getting worse, she says in the past she was given med try mirtazepine  by her PCP in [place], and she use it for short-term in her anxiety got better but she was feeling more sleepy during daytime then she stop taking this medication currently she is nonsmoker she drinks occasionally alcohol, she has no new stressors, she lives with her parents and brother, she says in last 2 nights she could not sleep at all, and sometimes she wake up in the middle of night and she feels heart racing and shaking but no chest tightness  She is single, she does not want to have children so she got her tubal ligation, and currently she is not sexually active      Review of Systems   Constitutional: Negative  HENT: Negative  Eyes: Negative  Respiratory: Negative  Cardiovascular: Negative  Gastrointestinal: Negative  Musculoskeletal: Negative  Neurological: Negative  Psychiatric/Behavioral: Positive for sleep disturbance  Negative for dysphoric mood, self-injury and suicidal ideas  The patient is nervous/anxious  Objective:  Physical Exam  Vitals signs reviewed  Constitutional:       Appearance: Normal appearance  HENT:      Right Ear: Tympanic membrane normal       Left Ear: Tympanic membrane normal    Eyes:      Extraocular Movements: Extraocular movements intact  Neck:      Musculoskeletal: Normal range of motion  Cardiovascular:      Rate and Rhythm: Normal rate  Heart sounds: No murmur  Abdominal:      General: Abdomen is flat  There is no distension  Palpations: There is no mass  Musculoskeletal: Normal range of motion  Skin:     Coloration: Skin is not jaundiced or pale  Neurological:      General: No focal deficit present  Mental Status: She is alert and oriented to person, place, and time     Psychiatric:         Mood and Affect: Mood normal             Past Surgical History:   Procedure Laterality Date    NC LAP,RMV  ADNEXAL STRUCTURE Bilateral 9/1/2020    Procedure: LAPAROSCOPIC SALPINGECTOMY;  Surgeon: Jess Alcala DO;  [facility];  Service: Gynecology    WISDOM TOOTH EXTRACTION         Family History   Problem Relation Age of Onset    Hypertension Mother     Endometrial cancer Mother     Hyperlipidemia Mother     Hypertension Father     Autism Brother     Other Paternal Grandmother         lymes    No Known Problems Paternal Grandfather     No Known Problems Half-Brother     No Known Problems Half-Brother          Current Outpatient Medications:     busPIRone (BUSPAR) 7 5 mg tablet, 1 po bid, Disp: 30 tablet, Rfl: 0    No Known Allergies    Vitals:    01/06/21 0918   BP: 118/68   Pulse: 100   Resp: 16   SpO2: 99% Weight: 53 1 kg (117 lb)   Height: 5' 1" (1 549 m)

## 2021-01-06 NOTE — ASSESSMENT & PLAN NOTE
Abuse per will start at bedtime and then she can take twice a day if she feels during daytime she still feel anxious and follow-up 1 week

## 2021-01-06 NOTE — ASSESSMENT & PLAN NOTE
Will start her on BuSpar just to help reduce anxiety and maybe she be able to sleep better, advised to have follow-up in 1 week

## 2021-01-14 DIAGNOSIS — F41.9 ANXIETY: ICD-10-CM

## 2021-01-14 DIAGNOSIS — G47.00 INSOMNIA, UNSPECIFIED TYPE: ICD-10-CM

## 2021-01-14 NOTE — TELEPHONE ENCOUNTER
Pt currently has 2 follow ups arlene 1/19/2021 with lj and 1/22/2021 with janes    need to find out who she is following with   [facility]

## 2021-01-17 RX ORDER — BUSPIRONE HYDROCHLORIDE 7.5 MG/1
TABLET ORAL
Qty: 30 TABLET | Refills: 0 | Status: SHIPPED | OUTPATIENT
Start: 2021-01-17 | End: 2021-02-04

## 2021-01-18 ENCOUNTER — TRANSCRIBE ORDERS (OUTPATIENT)
Dept: LAB | Facility: CLINIC | Age: 31
End: 2021-01-18

## 2021-01-18 ENCOUNTER — LAB (OUTPATIENT)
Dept: LAB | Facility: CLINIC | Age: 31
End: 2021-01-18
Payer: COMMERCIAL

## 2021-01-18 DIAGNOSIS — Z13.6 SCREENING FOR CARDIOVASCULAR CONDITION: ICD-10-CM

## 2021-01-18 LAB
ALBUMIN SERPL BCP-MCNC: 4.1 G/DL (ref 3.5–5)
ALP SERPL-CCNC: 59 U/L (ref 46–116)
ALT SERPL W P-5'-P-CCNC: 18 U/L (ref 12–78)
ANION GAP SERPL CALCULATED.3IONS-SCNC: 2 MMOL/L (ref 4–13)
AST SERPL W P-5'-P-CCNC: 21 U/L (ref 5–45)
BASOPHILS # BLD AUTO: 0.05 THOUSANDS/ΜL (ref 0–0.1)
BASOPHILS NFR BLD AUTO: 1 % (ref 0–1)
BILIRUB SERPL-MCNC: 0.39 MG/DL (ref 0.2–1)
BUN SERPL-MCNC: 13 MG/DL (ref 5–25)
CALCIUM SERPL-MCNC: 9.4 MG/DL (ref 8.3–10.1)
CHLORIDE SERPL-SCNC: 104 MMOL/L (ref 100–108)
CHOLEST SERPL-MCNC: 265 MG/DL (ref 50–200)
CO2 SERPL-SCNC: 30 MMOL/L (ref 21–32)
CREAT SERPL-MCNC: 0.68 MG/DL (ref 0.6–1.3)
EOSINOPHIL # BLD AUTO: 0.05 THOUSAND/ΜL (ref 0–0.61)
EOSINOPHIL NFR BLD AUTO: 1 % (ref 0–6)
ERYTHROCYTE [DISTWIDTH] IN BLOOD BY AUTOMATED COUNT: 13.7 % (ref 11.6–15.1)
GFR SERPL CREATININE-BSD FRML MDRD: 119 ML/MIN/1.73SQ M
GLUCOSE P FAST SERPL-MCNC: 97 MG/DL (ref 65–99)
HCT VFR BLD AUTO: 37.2 % (ref 34.8–46.1)
HDLC SERPL-MCNC: 60 MG/DL
HGB BLD-MCNC: 11.2 G/DL (ref 11.5–15.4)
IMM GRANULOCYTES # BLD AUTO: 0.01 THOUSAND/UL (ref 0–0.2)
IMM GRANULOCYTES NFR BLD AUTO: 0 % (ref 0–2)
LDLC SERPL CALC-MCNC: 190 MG/DL (ref 0–100)
LYMPHOCYTES # BLD AUTO: 3.05 THOUSANDS/ΜL (ref 0.6–4.47)
LYMPHOCYTES NFR BLD AUTO: 42 % (ref 14–44)
MCH RBC QN AUTO: 26 PG (ref 26.8–34.3)
MCHC RBC AUTO-ENTMCNC: 30.1 G/DL (ref 31.4–37.4)
MCV RBC AUTO: 86 FL (ref 82–98)
MONOCYTES # BLD AUTO: 0.46 THOUSAND/ΜL (ref 0.17–1.22)
MONOCYTES NFR BLD AUTO: 6 % (ref 4–12)
NEUTROPHILS # BLD AUTO: 3.62 THOUSANDS/ΜL (ref 1.85–7.62)
NEUTS SEG NFR BLD AUTO: 50 % (ref 43–75)
NONHDLC SERPL-MCNC: 205 MG/DL
NRBC BLD AUTO-RTO: 0 /100 WBCS
PLATELET # BLD AUTO: 271 THOUSANDS/UL (ref 149–390)
PMV BLD AUTO: 10.9 FL (ref 8.9–12.7)
POTASSIUM SERPL-SCNC: 4.5 MMOL/L (ref 3.5–5.3)
PROT SERPL-MCNC: 7.5 G/DL (ref 6.4–8.2)
RBC # BLD AUTO: 4.31 MILLION/UL (ref 3.81–5.12)
SODIUM SERPL-SCNC: 136 MMOL/L (ref 136–145)
TRIGL SERPL-MCNC: 75 MG/DL
TSH SERPL DL<=0.05 MIU/L-ACNC: 2.25 UIU/ML (ref 0.36–3.74)
WBC # BLD AUTO: 7.24 THOUSAND/UL (ref 4.31–10.16)

## 2021-01-18 PROCEDURE — 80053 COMPREHEN METABOLIC PANEL: CPT

## 2021-01-18 PROCEDURE — 36415 COLL VENOUS BLD VENIPUNCTURE: CPT

## 2021-01-18 PROCEDURE — 84443 ASSAY THYROID STIM HORMONE: CPT

## 2021-01-18 PROCEDURE — 85025 COMPLETE CBC W/AUTO DIFF WBC: CPT

## 2021-01-18 PROCEDURE — 80061 LIPID PANEL: CPT

## 2021-01-19 ENCOUNTER — OFFICE VISIT (OUTPATIENT)
Dept: FAMILY MEDICINE CLINIC | Facility: CLINIC | Age: 31
End: 2021-01-19
Payer: COMMERCIAL

## 2021-01-19 VITALS
WEIGHT: 118 LBS | TEMPERATURE: 98.6 F | BODY MASS INDEX: 22.28 KG/M2 | SYSTOLIC BLOOD PRESSURE: 120 MMHG | DIASTOLIC BLOOD PRESSURE: 80 MMHG | OXYGEN SATURATION: 99 % | RESPIRATION RATE: 16 BRPM | HEART RATE: 74 BPM | HEIGHT: 61 IN

## 2021-01-19 DIAGNOSIS — D50.9 IRON DEFICIENCY ANEMIA, UNSPECIFIED IRON DEFICIENCY ANEMIA TYPE: ICD-10-CM

## 2021-01-19 DIAGNOSIS — F41.9 ANXIETY: ICD-10-CM

## 2021-01-19 DIAGNOSIS — G47.00 INSOMNIA, UNSPECIFIED TYPE: Primary | ICD-10-CM

## 2021-01-19 DIAGNOSIS — E78.2 MIXED HYPERLIPIDEMIA: ICD-10-CM

## 2021-01-19 PROCEDURE — 3008F BODY MASS INDEX DOCD: CPT | Performed by: FAMILY MEDICINE

## 2021-01-19 PROCEDURE — 99214 OFFICE O/P EST MOD 30 MIN: CPT | Performed by: FAMILY MEDICINE

## 2021-01-19 PROCEDURE — 1036F TOBACCO NON-USER: CPT | Performed by: FAMILY MEDICINE

## 2021-01-20 NOTE — PROGRESS NOTES
Assessment/Plan:    Problem List Items Addressed This Visit        Other    Insomnia - Primary     She is sleeping better with BuSpar as her anxiety level is down and she has no side effects         Anxiety     Continue be spot as needed and she is only using at bedtime, will follow-up if she needs any change in medication now she feels her anxiety and insomnia is not under control         Mixed hyperlipidemia     Lab Results   Component Value Date    LDLCALC 190 (H) 01/18/2021   She has familial hyperlipidemia as mother had the same, discussed with her about medication, and will start with low-fat diet and exercise and repeat labs in 6 month to see if she can improve her cholesterol with lifestyle modification and she agrees instead of starting medication will start with lifestyle modification  She has tubal ligation so there is no contraindication for starting her on statins in the future           Relevant Orders    Lipid panel    Iron deficiency anemia     She has slightly low hemoglobin and she gets her normal periods, her advised to take over-the-counter iron tablet and will recheck labs in 6 months         Relevant Orders    CBC and differential          Chief Complaint   Patient presents with    Follow-up     1 week      Subjective:   Patient ID: Samantha Messer is a 35 y o  female  She is here follow-up on her anxiety and sleep problem she was started on BuSpar she says she only takes at nighttime she feels better sleep and less anxiety, she has not use even in the morning, she did not even use the neck is refill which was sent 2 days ago, she is feeling much better,  She had her labs done  She says her family history mother had hyperlipidemia in her young age    She denies any headache chest pain shortness of breath, but she says she feels little bit tired and feels more cold her periods are normal  She says her tubes have been tied and she has no plans to become pregnant      Review of Systems Constitutional: Negative for activity change, appetite change, chills, fatigue, fever and unexpected weight change  HENT: Negative for congestion, ear discharge, ear pain, nosebleeds, postnasal drip, rhinorrhea, sinus pressure, sneezing, sore throat, trouble swallowing and voice change  Eyes: Negative for photophobia, pain, discharge, redness and itching  Respiratory: Negative for cough, chest tightness, shortness of breath and wheezing  Cardiovascular: Negative for chest pain, palpitations and leg swelling  Gastrointestinal: Negative for abdominal pain, constipation, diarrhea, nausea and vomiting  Endocrine: Negative for polyuria  Genitourinary: Negative for dysuria, frequency and urgency  Musculoskeletal: Negative for arthralgias, back pain, myalgias and neck pain  Skin: Negative for color change, pallor and rash  Allergic/Immunologic: Negative for environmental allergies and food allergies  Neurological: Negative for dizziness, weakness, light-headedness and headaches  Hematological: Negative for adenopathy  Does not bruise/bleed easily  Psychiatric/Behavioral: Negative for behavioral problems  The patient is not nervous/anxious  Objective:  Physical Exam  Vitals signs and nursing note reviewed  Constitutional:       Appearance: Normal appearance  HENT:      Head: Atraumatic  Neck:      Musculoskeletal: Normal range of motion and neck supple  Cardiovascular:      Rate and Rhythm: Normal rate  Heart sounds: No murmur  Pulmonary:      Effort: Pulmonary effort is normal  No respiratory distress  Skin:     Coloration: Skin is not pale  Neurological:      Mental Status: She is oriented to person, place, and time  Psychiatric:         Mood and Affect: Mood normal          Behavior: Behavior normal          Thought Content:  Thought content normal          Judgment: Judgment normal             Past Surgical History:   Procedure Laterality Date    WI MAIKEL,RMV ADNEXAL STRUCTURE Bilateral 9/1/2020    Procedure: LAPAROSCOPIC SALPINGECTOMY;  Surgeon: Mya Peters DO;  [facility];  Service: Gynecology    WISDOM TOOTH EXTRACTION         Family History   Problem Relation Age of Onset    Hypertension Mother     Endometrial cancer Mother     Hyperlipidemia Mother     Hypertension Father     Autism Brother     Other Paternal Grandmother         lymes    No Known Problems Paternal Grandfather     No Known Problems Half-Brother     No Known Problems Half-Brother          Current Outpatient Medications:     busPIRone (BUSPAR) 7 5 mg tablet, TAKE 1 TABLET BY MOUTH TWICE A DAY, Disp: 30 tablet, Rfl: 0    No Known Allergies    Vitals:    01/19/21 1911   BP: 120/80   BP Location: Left arm   Patient Position: Sitting   Cuff Size: Standard   Pulse: 74   Resp: 16   Temp: 98 6 °F (37 °C)   TempSrc: Tympanic   SpO2: 99%   Weight: 53 5 kg (118 lb)   Height: 5' 1" (1 549 m)

## 2021-01-20 NOTE — ASSESSMENT & PLAN NOTE
Lab Results   Component Value Date    LDLCALC 190 (H) 01/18/2021   She has familial hyperlipidemia as mother had the same, discussed with her about medication, and will start with low-fat diet and exercise and repeat labs in 6 month to see if she can improve her cholesterol with lifestyle modification and she agrees instead of starting medication will start with lifestyle modification  She has tubal ligation so there is no contraindication for starting her on statins in the future

## 2021-01-20 NOTE — PATIENT INSTRUCTIONS
Low Fat Diet   WHAT YOU NEED TO KNOW:   A low-fat diet is an eating plan that is low in total fat, unhealthy fat, and cholesterol  You may need to follow a low-fat diet if you have trouble digesting or absorbing fat  You may also need to follow this diet if you have high cholesterol  You can also lower your cholesterol by increasing the amount of fiber in your diet  Soluble fiber is a type of fiber that helps to decrease cholesterol levels  DISCHARGE INSTRUCTIONS:   Different types of fat in food:   · Limit unhealthy fats  A diet that is high in cholesterol, saturated fat, and trans fat may cause unhealthy cholesterol levels  Unhealthy cholesterol levels increase your risk of heart disease  ? Cholesterol:  Limit intake of cholesterol to less than 200 mg per day  Cholesterol is found in meat, eggs, and dairy  ? Saturated fat:  Limit saturated fat to less than 7% of your total daily calories  Ask your dietitian how many calories you need each day  Saturated fat is found in butter, cheese, ice cream, whole milk, and palm oil  Saturated fat is also found in meat, such as beef, pork, chicken skin, and processed meats  Processed meats include sausage, hot dogs, and bologna  ? Trans fat:  Avoid trans fat as much as possible  Trans fat is used in fried and baked foods  Foods that say trans fat free on the label may still have up to 0 5 grams of trans fat per serving  · Include healthy fats  Replace foods that are high in saturated and trans fat with foods high in healthy fats  This may help to decrease high cholesterol levels  ? Monounsaturated fats: These are found in avocados, nuts, and vegetable oils, such as olive, canola, and sunflower oil  ? Polyunsaturated fats: These can be found in vegetable oils, such as soybean or corn oil  Omega-3 fats can help to decrease the risk of heart disease  Omega-3 fats are found in fish, such as salmon, herring, trout, and tuna   Omega-3 fats can also be found in plant foods, such as walnuts, flaxseed, soybeans, and canola oil  Foods to limit or avoid:   · Grains:      ? Snacks that are made with partially hydrogenated oils, such as chips, regular crackers, and butter-flavored popcorn    ? High-fat baked goods, such as biscuits, croissants, doughnuts, pies, cookies, and pastries    · Dairy:      ? Whole milk, 2% milk, and yogurt and ice cream made with whole milk    ? Half and half creamer, heavy cream, and whipping cream    ? Cheese, cream cheese, and sour cream    · Meats and proteins:      ? High-fat cuts of meat (T-bone steak, regular hamburger, and ribs)    ? Fried meat, poultry (turkey and chicken), and fish    ? Poultry (chicken and turkey) with skin    ? Cold cuts (salami or bologna), hot dogs, velarde, and sausage    ? Whole eggs and egg yolks    · Vegetables and fruits with added fat:      ? Fried vegetables or vegetables in butter or high-fat sauces, such as cream or cheese sauces    ? Fried fruit or fruit served with butter or cream    · Fats:      ? Butter, stick margarine, and shortening    ? Coconut, palm oil, and palm kernel oil    Foods to include:   · Grains:      ? Whole-grain breads, cereals, pasta, and brown rice    ? Low-fat crackers and pretzels    · Vegetables and fruits:      ? Fresh, frozen, or canned vegetables (no salt or low-sodium)    ? Fresh, frozen, dried, or canned fruit (canned in light syrup or fruit juice)    ? Avocado    · Low-fat dairy products:      ? Nonfat (skim) or 1% milk    ? Nonfat or low-fat cheese, yogurt, and cottage cheese    · Meats and proteins:      ? Chicken or turkey with no skin    ? Baked or broiled fish    ? Lean beef and pork (loin, round, extra lean hamburger)    ? Beans and peas, unsalted nuts, soy products    ? Egg whites and substitutes    ? Seeds and nuts    · Fats:      ? Unsaturated oil, such as canola, olive, peanut, soybean, or sunflower oil    ?  Soft or liquid margarine and vegetable oil spread    ? Low-fat salad dressing    Other ways to decrease fat:   · Read food labels before you buy foods  Choose foods that have less than 30% of calories from fat  Choose low-fat or fat-free dairy products  Remember that fat free does not mean calorie free  These foods still contain calories, and too many calories can lead to weight gain  · Trim fat from meat and avoid fried food  Trim all visible fat from meat before you cook it  Remove the skin from poultry  Do not jacobs meat, fish, or poultry  Bake, roast, boil, or broil these foods instead  Avoid fried foods  Eat a baked potato instead of [place] fries  Steam vegetables instead of sautéing them in butter  · Add less fat to foods  Use imitation velarde bits on salads and baked potatoes instead of regular velarde bits  Use fat-free or low-fat salad dressings instead of regular dressings  Use low-fat or nonfat butter-flavored topping instead of regular butter or margarine on popcorn and other foods  Ways to decrease fat in recipes:  Replace high-fat ingredients with low-fat or nonfat ones  This may cause baked goods to be drier than usual  You may need to use nonfat cooking spray on pans to prevent food from sticking  You also may need to change the amount of other ingredients, such as water, in the recipe  Try the following:  · Use low-fat or light margarine instead of regular margarine or shortening  · Use lean ground turkey breast or chicken, or lean ground beef (less than 5% fat) instead of hamburger  · Add 1 teaspoon of canola oil to 8 ounces of skim milk instead of using cream or half and half  · Use grated zucchini, carrots, or apples in breads instead of coconut  · Use blenderized, low-fat cottage cheese, plain tofu, or low-fat ricotta cheese instead of cream cheese  · Use 1 egg white and 1 teaspoon of canola oil, or use ¼ cup (2 ounces) of fat-free egg substitute instead of a whole egg       · Replace half of the oil that is called for in a recipe with applesauce when you bake  Use 3 tablespoons of cocoa powder and 1 tablespoon of canola oil instead of a square of baking chocolate  How to increase fiber:  Eat enough high-fiber foods to get 20 to 30 grams of fiber every day  Slowly increase your fiber intake to avoid stomach cramps, gas, and other problems  · Eat 3 ounces of whole-grain foods each day  An ounce is about 1 slice of bread  Eat whole-grain breads, such as whole-wheat bread  Whole wheat, whole-wheat flour, or other whole grains should be listed as the first ingredient on the food label  Replace white flour with whole-grain flour or use half of each in recipes  Whole-grain flour is heavier than white flour, so you may have to add more yeast or baking powder  · Eat a high-fiber cereal for breakfast   Oatmeal is a good source of soluble fiber  Look for cereals that have bran or fiber in the name  Choose whole-grain products, such as brown rice, barley, and whole-wheat pasta  · Eat more beans, peas, and lentils  For example, add beans to soups or salads  Eat at least 5 cups of fruits and vegetables each day  Eat fruits and vegetables with the peel because the peel is high in fiber  © Copyright [address] Information is for End User's use only and may not be sold, redistributed or otherwise used for commercial purposes  All illustrations and images included in CareNotes® are the copyrighted property of A D A M , Inc  or [address] [place], [place] [address]amrik tanna   The above information is an  only  It is not intended as medical advice for individual conditions or treatments  Talk to your doctor, nurse or pharmacist before following any medical regimen to see if it is safe and effective for you

## 2021-01-20 NOTE — ASSESSMENT & PLAN NOTE
Continue be spot as needed and she is only using at bedtime, will follow-up if she needs any change in medication now she feels her anxiety and insomnia is not under control

## 2021-01-20 NOTE — ASSESSMENT & PLAN NOTE
She has slightly low hemoglobin and she gets her normal periods, her advised to take over-the-counter iron tablet and will recheck labs in 6 months

## 2021-01-31 DIAGNOSIS — G47.00 INSOMNIA, UNSPECIFIED TYPE: ICD-10-CM

## 2021-01-31 DIAGNOSIS — F41.9 ANXIETY: ICD-10-CM

## 2021-02-04 RX ORDER — BUSPIRONE HYDROCHLORIDE 7.5 MG/1
TABLET ORAL
Qty: 180 TABLET | Refills: 1 | Status: SHIPPED | OUTPATIENT
Start: 2021-02-04 | End: 2021-03-22

## 2021-03-11 ENCOUNTER — TELEPHONE (OUTPATIENT)
Dept: FAMILY MEDICINE CLINIC | Facility: CLINIC | Age: 31
End: 2021-03-11

## 2021-03-11 NOTE — TELEPHONE ENCOUNTER
Patient scheduled online appointment for Mild to moderate and persistent pain in lower right abdomen       Can we please triage as patient is not scheduled until 3/17

## 2021-03-17 ENCOUNTER — OFFICE VISIT (OUTPATIENT)
Dept: FAMILY MEDICINE CLINIC | Facility: CLINIC | Age: 31
End: 2021-03-17
Payer: COMMERCIAL

## 2021-03-17 VITALS
WEIGHT: 112 LBS | DIASTOLIC BLOOD PRESSURE: 80 MMHG | SYSTOLIC BLOOD PRESSURE: 118 MMHG | BODY MASS INDEX: 21.14 KG/M2 | HEART RATE: 102 BPM | OXYGEN SATURATION: 98 % | RESPIRATION RATE: 16 BRPM | HEIGHT: 61 IN

## 2021-03-17 DIAGNOSIS — N94.0 MITTELSCHMERZ: ICD-10-CM

## 2021-03-17 DIAGNOSIS — F41.9 ANXIETY: ICD-10-CM

## 2021-03-17 DIAGNOSIS — R30.0 DYSURIA: Primary | ICD-10-CM

## 2021-03-17 DIAGNOSIS — G47.00 INSOMNIA, UNSPECIFIED TYPE: ICD-10-CM

## 2021-03-17 LAB
SL AMB  POCT GLUCOSE, UA: NORMAL
SL AMB LEUKOCYTE ESTERASE,UA: 75
SL AMB POCT BILIRUBIN,UA: ABNORMAL
SL AMB POCT BLOOD,UA: ABNORMAL
SL AMB POCT CLARITY,UA: CLEAR
SL AMB POCT COLOR,UA: YELLOW
SL AMB POCT KETONES,UA: ABNORMAL
SL AMB POCT NITRITE,UA: ABNORMAL
SL AMB POCT PH,UA: 8
SL AMB POCT SPECIFIC GRAVITY,UA: 1.01
SL AMB POCT URINE PROTEIN: ABNORMAL
SL AMB POCT UROBILINOGEN: NORMAL

## 2021-03-17 PROCEDURE — 81003 URINALYSIS AUTO W/O SCOPE: CPT | Performed by: FAMILY MEDICINE

## 2021-03-17 PROCEDURE — 99213 OFFICE O/P EST LOW 20 MIN: CPT | Performed by: FAMILY MEDICINE

## 2021-03-17 RX ORDER — NAPROXEN 500 MG/1
500 TABLET ORAL 2 TIMES DAILY WITH MEALS
Qty: 28 TABLET | Refills: 0 | Status: SHIPPED | OUTPATIENT
Start: 2021-03-17 | End: 2021-12-09 | Stop reason: ALTCHOICE

## 2021-03-17 NOTE — PROGRESS NOTES
Assessment/Plan:   Diagnoses and all orders for this visit:    Dysuria  -     POCT urine dip  - UA in the office with (+) LE, no nitrates or blood   - not currently sexually active - STD screening not indicated   - FDLMP: 2/26/2021, gets monthly   - no increase in frequency of urination, odor, burning   - will send out for UCx but pain does not appear to be 2/2 UTI     Mittelschmerz  -     naproxen (NAPROSYN) 500 mg tablet; Take 1 tablet (500 mg total) by mouth 2 (two) times a day with meals  - given the location, timing and location of pain - appears to be Mittelschmerz  - will treat with NSAIDs and hot water bottle     Anxiety  Insomnia, unspecified type  - currently on Buspar 7 5mg QHS for anxiety and insomnia  - advised to f/u with her PCP for further eval - pt aware and agreeable           Subjective:    Patient ID: Manny Spencer is a 35 y o  female  HPI   35yo F presents to the office for eval of dysuria   - UA in the office with (+) LE, no nitrates or blood   - unable to sleep the past 3days  - pt on Buspar 7 5mg QHS for insomnia   - denies F/C/N/V/  - trying to change diet   - (+) R-sided stomach pain (mild-moderate stabbing pain) for a few weeks; seems to be worse laying down or not active  - not currently sexually active  - FDLMP: 2/26/2021, gets monthly   - no increase in frequency of urination, odor, burning       The following portions of the patient's history were reviewed and updated as appropriate: allergies, current medications, past family history, past medical history, past social history, past surgical history and problem list     Review of Systems  as per HPI    Objective:  /80 (BP Location: Left arm, Patient Position: Sitting, Cuff Size: Standard)   Pulse 102   Resp 16   Ht 5' 1" (1 549 m)   Wt 50 8 kg (112 lb)   SpO2 98%   BMI 21 16 kg/m²    Physical Exam  Vitals signs reviewed  Constitutional:       General: She is not in acute distress       Appearance: She is well-developed and normal weight  She is not ill-appearing, toxic-appearing or diaphoretic  HENT:      Head: Normocephalic and atraumatic  Cardiovascular:      Rate and Rhythm: Normal rate and regular rhythm  Heart sounds: Normal heart sounds  No murmur  No friction rub  No gallop  Pulmonary:      Effort: Pulmonary effort is normal  No respiratory distress  Breath sounds: Normal breath sounds  No stridor  No wheezing, rhonchi or rales  Abdominal:      General: Abdomen is flat  Bowel sounds are normal  There is no distension  There are no signs of injury  Palpations: Abdomen is soft  There is no fluid wave or mass  Tenderness: There is abdominal tenderness in the right lower quadrant  There is no right CVA tenderness or left CVA tenderness  Hernia: No hernia is present  Skin:     General: Skin is warm  Neurological:      General: No focal deficit present  Mental Status: She is alert

## 2021-03-21 LAB
APPEARANCE UR: CLEAR
BACTERIA UR QL AUTO: ABNORMAL /HPF
BILIRUB UR QL STRIP: NEGATIVE
COLOR UR: YELLOW
GLUCOSE UR QL STRIP: NEGATIVE
HGB UR QL STRIP: NEGATIVE
HYALINE CASTS #/AREA URNS LPF: ABNORMAL /LPF
KETONES UR QL STRIP: NEGATIVE
LEUKOCYTE ESTERASE UR QL STRIP: ABNORMAL
NITRITE UR QL STRIP: NEGATIVE
PH UR STRIP: 8 [PH] (ref 5–8)
PROT UR QL STRIP: NEGATIVE
RBC #/AREA URNS HPF: ABNORMAL /HPF
SP GR UR STRIP: 1.01 (ref 1–1.03)
SQUAMOUS #/AREA URNS HPF: ABNORMAL /HPF
WBC #/AREA URNS HPF: ABNORMAL /HPF

## 2021-03-22 ENCOUNTER — OFFICE VISIT (OUTPATIENT)
Dept: FAMILY MEDICINE CLINIC | Facility: CLINIC | Age: 31
End: 2021-03-22
Payer: COMMERCIAL

## 2021-03-22 VITALS
WEIGHT: 112 LBS | DIASTOLIC BLOOD PRESSURE: 68 MMHG | HEART RATE: 100 BPM | RESPIRATION RATE: 16 BRPM | HEIGHT: 61 IN | SYSTOLIC BLOOD PRESSURE: 110 MMHG | OXYGEN SATURATION: 98 % | BODY MASS INDEX: 21.14 KG/M2

## 2021-03-22 DIAGNOSIS — G47.00 INSOMNIA, UNSPECIFIED TYPE: ICD-10-CM

## 2021-03-22 DIAGNOSIS — F41.9 ANXIETY: Primary | ICD-10-CM

## 2021-03-22 DIAGNOSIS — D50.9 IRON DEFICIENCY ANEMIA, UNSPECIFIED IRON DEFICIENCY ANEMIA TYPE: ICD-10-CM

## 2021-03-22 DIAGNOSIS — E78.2 MIXED HYPERLIPIDEMIA: ICD-10-CM

## 2021-03-22 PROCEDURE — 1036F TOBACCO NON-USER: CPT | Performed by: FAMILY MEDICINE

## 2021-03-22 PROCEDURE — 99214 OFFICE O/P EST MOD 30 MIN: CPT | Performed by: FAMILY MEDICINE

## 2021-03-22 PROCEDURE — 3008F BODY MASS INDEX DOCD: CPT | Performed by: FAMILY MEDICINE

## 2021-03-22 RX ORDER — BUSPIRONE HYDROCHLORIDE 15 MG/1
TABLET ORAL
Qty: 30 TABLET | Refills: 0 | Status: SHIPPED | OUTPATIENT
Start: 2021-03-22 | End: 2021-04-05 | Stop reason: ALTCHOICE

## 2021-03-22 RX ORDER — FERROUS SULFATE TAB EC 324 MG (65 MG FE EQUIVALENT) 324 (65 FE) MG
324 TABLET DELAYED RESPONSE ORAL
Qty: 30 TABLET | Refills: 5 | Status: SHIPPED | OUTPATIENT
Start: 2021-03-22 | End: 2021-06-15

## 2021-03-22 NOTE — ASSESSMENT & PLAN NOTE
Hemoglobin is slightly low, sometimes she get heart racing, will start the iron and advised to take all is to avoid any constipation

## 2021-03-22 NOTE — ASSESSMENT & PLAN NOTE
Her cholesterol was very high in the past labs and she is saying that she is working on low-fat diet

## 2021-03-22 NOTE — ASSESSMENT & PLAN NOTE
Will increase the BuSpar and will follow-up in 2 weeks if her sleep does not improve then needs to change the medication

## 2021-03-22 NOTE — ASSESSMENT & PLAN NOTE
Increase the dose of BuSpar 50 mg at bedtime to relieve her anxiety and help her to sleep, follow-up in 2 weeks if not feeling better

## 2021-03-22 NOTE — PROGRESS NOTES
Assessment/Plan:    Problem List Items Addressed This Visit        Other    Insomnia     Will increase the BuSpar and will follow-up in 2 weeks if her sleep does not improve then needs to change the medication         Relevant Medications    busPIRone (BUSPAR) 15 mg tablet    Anxiety - Primary     Increase the dose of BuSpar 50 mg at bedtime to relieve her anxiety and help her to sleep, follow-up in 2 weeks if not feeling better         Relevant Medications    busPIRone (BUSPAR) 15 mg tablet    Mixed hyperlipidemia     Her cholesterol was very high in the past labs and she is saying that she is working on low-fat diet         Iron deficiency anemia     Hemoglobin is slightly low, sometimes she get heart racing, will start the iron and advised to take all is to avoid any constipation         Relevant Medications    ferrous sulfate 324 (65 Fe) mg          Chief Complaint   Patient presents with    Follow-up       Subjective:   Patient ID: Shane Dawson is a 35 y o  female  She complains of having trouble in sleeping and maintaining sleep, initially 1 month she says BuSpar was working fine she was taking 7 5 mg at bedtime only and now she says it does not work and sometimes all night she is awake, and she feels like her heart start racing, she had her labs previously and it shows mild anemia, her thyroid level was fine,      HPI    Review of Systems   Constitutional: Negative for activity change, appetite change, chills, fatigue, fever and unexpected weight change  HENT: Negative for congestion, ear discharge, ear pain, nosebleeds, postnasal drip, rhinorrhea, sinus pressure, sneezing, sore throat, trouble swallowing and voice change  Eyes: Negative for photophobia, pain, discharge, redness and itching  Respiratory: Negative for cough, chest tightness, shortness of breath and wheezing  Cardiovascular: Negative for chest pain, palpitations and leg swelling     Gastrointestinal: Negative for abdominal pain, constipation, diarrhea, nausea and vomiting  Endocrine: Negative for polyuria  Genitourinary: Negative for dysuria, frequency and urgency  Musculoskeletal: Negative for arthralgias, back pain, myalgias and neck pain  Skin: Negative for color change, pallor and rash  Allergic/Immunologic: Negative for environmental allergies and food allergies  Neurological: Negative for dizziness, weakness, light-headedness and headaches  Hematological: Negative for adenopathy  Does not bruise/bleed easily  Psychiatric/Behavioral: Positive for sleep disturbance  Negative for behavioral problems  The patient is nervous/anxious  Objective:  Physical Exam  Vitals signs and nursing note reviewed  Cardiovascular:      Heart sounds: No murmur  Pulmonary:      Effort: Pulmonary effort is normal  No respiratory distress  Breath sounds: No wheezing  Neurological:      General: No focal deficit present  Mental Status: She is alert  Psychiatric:         Mood and Affect: Mood normal          Thought Content:  Thought content normal             Past Surgical History:   Procedure Laterality Date    [place] LAP,RMV  ADNEXAL STRUCTURE Bilateral 9/1/2020    Procedure: LAPAROSCOPIC SALPINGECTOMY;  Surgeon: Harris Anderson DO;  [facility];  Service: Gynecology    WISDOM TOOTH EXTRACTION         Family History   Problem Relation Age of Onset    Hypertension Mother     Endometrial cancer Mother     Hyperlipidemia Mother     Hypertension Father     Autism Brother     Other Paternal Grandmother         lymes    No Known Problems Paternal Grandfather     No Known Problems Half-Brother     No Known Problems Half-Brother          Current Outpatient Medications:     busPIRone (BUSPAR) 15 mg tablet, 1 po daily at hs, Disp: 30 tablet, Rfl: 0    ferrous sulfate 324 (65 Fe) mg, Take 1 tablet (324 mg total) by mouth 2 (two) times a day before meals, Disp: 30 tablet, Rfl: 5    naproxen (NAPROSYN) 500 mg tablet, Take 1 tablet (500 mg total) by mouth 2 (two) times a day with meals (Patient not taking: Reported on 3/22/2021), Disp: 28 tablet, Rfl: 0    No Known Allergies    Vitals:    03/22/21 1143 03/22/21 1204   BP: 110/68    Pulse: (!) 110 100   Resp: 16    SpO2: 98%    Weight: 50 8 kg (112 lb)    Height: 5' 1" (1 549 m)

## 2021-03-24 ENCOUNTER — TELEPHONE (OUTPATIENT)
Dept: FAMILY MEDICINE CLINIC | Facility: CLINIC | Age: 31
End: 2021-03-24

## 2021-03-24 DIAGNOSIS — N30.00 ACUTE CYSTITIS WITHOUT HEMATURIA: Primary | ICD-10-CM

## 2021-03-24 RX ORDER — NITROFURANTOIN 25; 75 MG/1; MG/1
100 CAPSULE ORAL 2 TIMES DAILY
Qty: 14 CAPSULE | Refills: 0 | Status: SHIPPED | OUTPATIENT
Start: 2021-03-24 | End: 2021-03-31

## 2021-03-24 NOTE — TELEPHONE ENCOUNTER
----- Message from Luisito Vang DO sent at 3/24/2021  1:14 PM EDT -----  UCx with >100,000 Ecoli - will eRx Macrobid to pharmacy on file - thanks

## 2021-04-05 ENCOUNTER — OFFICE VISIT (OUTPATIENT)
Dept: FAMILY MEDICINE CLINIC | Facility: CLINIC | Age: 31
End: 2021-04-05
Payer: COMMERCIAL

## 2021-04-05 VITALS
SYSTOLIC BLOOD PRESSURE: 120 MMHG | RESPIRATION RATE: 16 BRPM | BODY MASS INDEX: 21.14 KG/M2 | HEIGHT: 61 IN | DIASTOLIC BLOOD PRESSURE: 70 MMHG | HEART RATE: 96 BPM | OXYGEN SATURATION: 97 % | WEIGHT: 112 LBS

## 2021-04-05 DIAGNOSIS — D50.9 IRON DEFICIENCY ANEMIA, UNSPECIFIED IRON DEFICIENCY ANEMIA TYPE: ICD-10-CM

## 2021-04-05 DIAGNOSIS — G47.00 INSOMNIA, UNSPECIFIED TYPE: Primary | ICD-10-CM

## 2021-04-05 PROCEDURE — 99213 OFFICE O/P EST LOW 20 MIN: CPT | Performed by: FAMILY MEDICINE

## 2021-04-05 RX ORDER — ZOLPIDEM TARTRATE 10 MG/1
10 TABLET ORAL
Qty: 30 TABLET | Refills: 0 | Status: SHIPPED | OUTPATIENT
Start: 2021-04-05 | End: 2021-12-09 | Stop reason: ALTCHOICE

## 2021-04-05 NOTE — PROGRESS NOTES
Assessment/Plan:    Problem List Items Addressed This Visit        Other    Insomnia - Primary    Relevant Medications    zolpidem (AMBIEN) 10 mg tablet  As she has very long history of insomnia advised to see the sleep specialist BuSpar did not help, will try Ambien  Discussed about no caffeine during the daytime, she says she does not drink coffee,    Other Relevant Orders    Ambulatory referral to Sleep Medicine      Follow-up 2 weeks and she will call if Ambien does not work so I can change to something different    Chief Complaint   Patient presents with    Follow-up       Subjective:   Patient ID: Samantha Kowalski is a 35 y o  female  She has a very long-term problem for insomnia, she says in the past she has been given many medications and the try as a mitrazepine was working somewhat, she says few months she has really bad problem then she get somewhat better now she has tried BuSpar low dose and then high dose but did not get much help and she says she just hardly sleeps 3 hour and the next day she is not very effective in working which will affect her work hours  And her boss is working with her so sometimes she goes late  She does not feel depressed or very anxious he does not drink coffee and she has been taking iron few times a week as it gives her some stomach upset      Review of Systems   Constitutional: Negative  HENT: Negative  Eyes: Negative  Respiratory: Negative  Cardiovascular: Negative  Musculoskeletal: Negative  Psychiatric/Behavioral: Positive for sleep disturbance  Negative for agitation, behavioral problems, dysphoric mood and self-injury  Objective:  Physical Exam  Vitals signs reviewed  Constitutional:       Appearance: She is well-developed  HENT:      Head: Normocephalic and atraumatic  Eyes:      General: No scleral icterus  Conjunctiva/sclera: Conjunctivae normal       Pupils: Pupils are equal, round, and reactive to light     Neck: Musculoskeletal: Normal range of motion and neck supple  Thyroid: No thyromegaly  Cardiovascular:      Rate and Rhythm: Normal rate and regular rhythm  Heart sounds: Normal heart sounds  Pulmonary:      Breath sounds: Normal breath sounds  No wheezing or rales  Musculoskeletal:      Right lower leg: No edema  Left lower leg: No edema  Lymphadenopathy:      Cervical: No cervical adenopathy  Skin:     Findings: No erythema or rash  Neurological:      Mental Status: She is alert     Psychiatric:         Mood and Affect: Mood normal             Past Surgical History:   Procedure Laterality Date    [place] LAP,RMV  ADNEXAL STRUCTURE Bilateral 9/1/2020    Procedure: LAPAROSCOPIC SALPINGECTOMY;  Surgeon: Danny Beavers DO;  [facility];  Service: Gynecology    WISDOM TOOTH EXTRACTION         Family History   Problem Relation Age of Onset    Hypertension Mother     Endometrial cancer Mother     Hyperlipidemia Mother     Hypertension Father     Autism Brother     Other Paternal Grandmother         lymes    No Known Problems Paternal Grandfather     No Known Problems Half-Brother     No Known Problems Half-Brother          Current Outpatient Medications:     ferrous sulfate 324 (65 Fe) mg, Take 1 tablet (324 mg total) by mouth 2 (two) times a day before meals, Disp: 30 tablet, Rfl: 5    naproxen (NAPROSYN) 500 mg tablet, Take 1 tablet (500 mg total) by mouth 2 (two) times a day with meals, Disp: 28 tablet, Rfl: 0    zolpidem (AMBIEN) 10 mg tablet, Take 1 tablet (10 mg total) by mouth daily at bedtime as needed for sleep, Disp: 30 tablet, Rfl: 0    No Known Allergies    Vitals:    04/05/21 1419   BP: 120/70   Pulse: 96   Resp: 16   SpO2: 97%   Weight: 50 8 kg (112 lb)   Height: 5' 1" (1 549 m)

## 2021-04-05 NOTE — ASSESSMENT & PLAN NOTE
She has started taking iron 2-3 times per week as she cannot take every day due to stomach irritation 65

## 2021-04-14 DIAGNOSIS — Z23 ENCOUNTER FOR IMMUNIZATION: ICD-10-CM

## 2021-04-16 ENCOUNTER — OFFICE VISIT (OUTPATIENT)
Dept: URGENT CARE | Facility: CLINIC | Age: 31
End: 2021-04-16
Payer: COMMERCIAL

## 2021-04-16 VITALS
RESPIRATION RATE: 16 BRPM | BODY MASS INDEX: 20.96 KG/M2 | DIASTOLIC BLOOD PRESSURE: 71 MMHG | WEIGHT: 111 LBS | OXYGEN SATURATION: 100 % | TEMPERATURE: 97.1 F | SYSTOLIC BLOOD PRESSURE: 133 MMHG | HEIGHT: 61 IN | HEART RATE: 70 BPM

## 2021-04-16 DIAGNOSIS — R10.11 RIGHT UPPER QUADRANT ABDOMINAL PAIN: Primary | ICD-10-CM

## 2021-04-16 PROCEDURE — 99213 OFFICE O/P EST LOW 20 MIN: CPT | Performed by: NURSE PRACTITIONER

## 2021-04-16 NOTE — PATIENT INSTRUCTIONS
Monitor for worsening symptoms- proceed to ER if symptoms worsen   Ibuprofen as needed for pain   Alternate ice and heat as needed  Follow up with your PCP as needed    Abdominal Pain   WHAT YOU NEED TO KNOW:   Abdominal pain can be dull, achy, or sharp  You may have pain in one area of your abdomen, or in your entire abdomen  Your pain may be caused by a condition such as constipation, food sensitivity or poisoning, infection, or a blockage  Abdominal pain can also be from a hernia, appendicitis, or an ulcer  Liver, gallbladder, or kidney conditions can also cause abdominal pain  The cause of your abdominal pain may be unknown  DISCHARGE INSTRUCTIONS:   Return to the emergency department if:   · You have new chest pain or shortness of breath  · You have pulsing pain in your upper abdomen or lower back that suddenly becomes constant  · Your pain is in the right lower abdominal area and worsens with movement  · You have a fever over 100 4°F (38°C) or shaking chills  · You are vomiting and cannot keep food or liquids down  · Your pain does not improve or gets worse over the next 8 to 12 hours  · You see blood in your vomit or bowel movements, or they look black and tarry  · Your skin or the whites of your eyes turn yellow  · You are a woman and have a large amount of vaginal bleeding that is not your monthly period  Contact your healthcare provider if:   · You have pain in your lower back  · You are a man and have pain in your testicles  · You have pain when you urinate  · You have questions or concerns about your condition or care  Follow up with your healthcare provider within 24 hours or as directed:  Write down your questions so you remember to ask them during your visits  Medicines:   · Medicines  may be given to calm your stomach and prevent vomiting or to decrease pain  Ask how to take pain medicine safely  · Take your medicine as directed    Contact your healthcare provider if you think your medicine is not helping or if you have side effects  Tell him of her if you are allergic to any medicine  Keep a list of the medicines, vitamins, and herbs you take  Include the amounts, and when and why you take them  Bring the list or the pill bottles to follow-up visits  Carry your medicine list with you in case of an emergency  © Copyright [address] Information is for End User's use only and may not be sold, redistributed or otherwise used for commercial purposes  All illustrations and images included in CareNotes® are the copyrighted property of [employer]  or [address] [place], [place] [address]tanna   The above information is an  only  It is not intended as medical advice for individual conditions or treatments  Talk to your doctor, nurse or pharmacist before following any medical regimen to see if it is safe and effective for you

## 2021-04-16 NOTE — PROGRESS NOTES
[facility]        NAME: Antony Sheth is a 35 y o  female  : 1990    MRN: [id number]  DATE: 2021  TIME: 2:11 PM    Assessment and Plan   Right upper quadrant abdominal pain [R10 11]  1  Right upper quadrant abdominal pain           Patient Instructions   Monitor for worsening symptoms- proceed to ER if symptoms worsen   Ibuprofen as needed for pain   Alternate ice and heat as needed  Follow up with your PCP as needed    Follow up with PCP in 3-5 days  Proceed to  ER if symptoms worsen  Chief Complaint     Chief Complaint   Patient presents with    Abdominal Pain     right upper abd pain, right under right rib cage x 1 day         History of Present Illness       Patient is a 35year old female presenting with RUQ abdominal pain since yesterday  Pain is worse with movement  Denies fever, chills, N/V/D, appetite changes, SOB, or CP  No change in pain with eating  Denies hematuria, dysuria, or back pain  No OTC medications  Review of Systems   Review of Systems   Constitutional: Negative for activity change, chills and fever  Respiratory: Negative for cough, chest tightness and shortness of breath  Gastrointestinal: Positive for abdominal pain  Negative for diarrhea, nausea and vomiting  Genitourinary: Negative for dysuria, flank pain, frequency, hematuria and urgency  Musculoskeletal: Negative for back pain and myalgias  Skin: Negative for color change and rash  Neurological: Negative for headaches           Current Medications       Current Outpatient Medications:     ferrous sulfate 324 (65 Fe) mg, Take 1 tablet (324 mg total) by mouth 2 (two) times a day before meals, Disp: 30 tablet, Rfl: 5    sertraline (ZOLOFT) 50 mg tablet, Take 50 mg by mouth daily, Disp: , Rfl:     naproxen (NAPROSYN) 500 mg tablet, Take 1 tablet (500 mg total) by mouth 2 (two) times a day with meals, Disp: 28 tablet, Rfl: 0    zolpidem (AMBIEN) 10 mg tablet, Take 1 tablet (10 mg total) by mouth daily at bedtime as needed for sleep, Disp: 30 tablet, Rfl: 0    Current Allergies     Allergies as of 04/16/2021    (No Known Allergies)            The following portions of the patient's history were reviewed and updated as appropriate: allergies, current medications, past family history, past medical history, past social history, past surgical history and problem list      History reviewed  No pertinent past medical history  Past Surgical History:   Procedure Laterality Date    [place] LAP,RMV  ADNEXAL STRUCTURE Bilateral 9/1/2020    Procedure: LAPAROSCOPIC SALPINGECTOMY;  Surgeon: Octavia Lynch DO;  [facility];  Service: Gynecology    WISDOM TOOTH EXTRACTION         Family History   Problem Relation Age of Onset    Hypertension Mother     Endometrial cancer Mother     Hyperlipidemia Mother     Hypertension Father     Autism Brother     Other Paternal Grandmother         lymes    No Known Problems Paternal Grandfather     No Known Problems Half-Brother     No Known Problems Half-Brother          Medications have been verified  Objective   /71   Pulse 70   Temp (!) 97 1 °F (36 2 °C)   Resp 16   Ht 5' 1" (1 549 m)   Wt 50 3 kg (111 lb)   SpO2 100%   BMI 20 97 kg/m²        Physical Exam     Physical Exam  Vitals signs reviewed  Constitutional:       General: She is awake  She is not in acute distress  Appearance: She is well-developed and normal weight  HENT:      Head: Normocephalic  Nose: Nose normal    Cardiovascular:      Rate and Rhythm: Normal rate and regular rhythm  Heart sounds: Normal heart sounds, S1 normal and S2 normal    Pulmonary:      Effort: Pulmonary effort is normal       Breath sounds: Normal breath sounds  No decreased breath sounds, wheezing, rhonchi or rales  Chest:       Abdominal:      General: Abdomen is flat  Bowel sounds are normal       Palpations: Abdomen is soft  Tenderness:  There is abdominal tenderness (over right inferior chest wall) in the right upper quadrant  There is no right CVA tenderness or left CVA tenderness  Negative signs include Diaz's sign  Skin:     General: Skin is warm and moist    Neurological:      General: No focal deficit present  Mental Status: She is alert, oriented to person, place, and time and easily aroused  Psychiatric:         Behavior: Behavior is cooperative

## 2021-04-19 ENCOUNTER — OFFICE VISIT (OUTPATIENT)
Dept: FAMILY MEDICINE CLINIC | Facility: CLINIC | Age: 31
End: 2021-04-19
Payer: COMMERCIAL

## 2021-04-19 VITALS
HEIGHT: 61 IN | HEART RATE: 96 BPM | BODY MASS INDEX: 20.96 KG/M2 | RESPIRATION RATE: 16 BRPM | DIASTOLIC BLOOD PRESSURE: 70 MMHG | OXYGEN SATURATION: 98 % | WEIGHT: 111 LBS | SYSTOLIC BLOOD PRESSURE: 110 MMHG

## 2021-04-19 DIAGNOSIS — F41.9 ANXIETY: ICD-10-CM

## 2021-04-19 DIAGNOSIS — G47.00 INSOMNIA, UNSPECIFIED TYPE: Primary | ICD-10-CM

## 2021-04-19 PROCEDURE — 3008F BODY MASS INDEX DOCD: CPT | Performed by: FAMILY MEDICINE

## 2021-04-19 PROCEDURE — 99214 OFFICE O/P EST MOD 30 MIN: CPT | Performed by: FAMILY MEDICINE

## 2021-04-19 PROCEDURE — 1036F TOBACCO NON-USER: CPT | Performed by: FAMILY MEDICINE

## 2021-04-19 PROCEDURE — 3725F SCREEN DEPRESSION PERFORMED: CPT | Performed by: FAMILY MEDICINE

## 2021-04-19 NOTE — PROGRESS NOTES
Assessment/Plan:    Problem List Items Addressed This Visit        Other    Insomnia - Primary     She will take only Ambien 5 mg as needed she should not take it regularly         Anxiety     Sleep medicine doctor suggested her to take Zoloft and now she started 50 mg as prescribed by him and she will follow-up with him in 1 month           Abdominal pain has resolved she was seen by urgent care, if her symptoms come back she will follow-up    Chief Complaint   Patient presents with    Follow-up       Subjective:   Patient ID: Samantha Maldonado is a 35 y o  female  She was seen by sleeps specialist, and he advised her to start doing some exercise be a will therapy, and then he started on Zoloft 50 mg which she started 3 days ago and she says he recommended not to take Ambien and daily, and then she cut down to 5 mg and now she stop taking that  And she only is taking Zoloft  She also seen by urgent care for right upper abdominal pain, she had this pain for 3 days then it resolved completely she has no nausea vomiting fever or chills with that and she has normal appetite      Review of Systems   Constitutional: Negative  HENT: Negative  Eyes: Negative  Respiratory: Negative  Cardiovascular: Negative  Gastrointestinal: Negative  Genitourinary: Negative  Musculoskeletal: Negative  Psychiatric/Behavioral: Negative  Objective:  Physical Exam  Vitals signs reviewed  Constitutional:       Appearance: Normal appearance  She is not ill-appearing  Pulmonary:      Effort: Pulmonary effort is normal  No respiratory distress  Abdominal:      General: Abdomen is flat  There is no distension  Palpations: There is no mass  Tenderness: There is no abdominal tenderness  Neurological:      General: No focal deficit present  Mental Status: She is alert     Psychiatric:         Mood and Affect: Mood normal             Past Surgical History:   Procedure Laterality Date    [place] LAP,RMV ADNEXAL STRUCTURE Bilateral 9/1/2020    Procedure: LAPAROSCOPIC SALPINGECTOMY;  Surgeon: Ceci Medrano DO;  [facility];  Service: Gynecology    WISDOM TOOTH EXTRACTION         Family History   Problem Relation Age of Onset    Hypertension Mother     Endometrial cancer Mother     Hyperlipidemia Mother     Hypertension Father     Autism Brother     Other Paternal Grandmother         lymes    No Known Problems Paternal Grandfather     No Known Problems Half-Brother     No Known Problems Half-Brother          Current Outpatient Medications:     ferrous sulfate 324 (65 Fe) mg, Take 1 tablet (324 mg total) by mouth 2 (two) times a day before meals, Disp: 30 tablet, Rfl: 5    naproxen (NAPROSYN) 500 mg tablet, Take 1 tablet (500 mg total) by mouth 2 (two) times a day with meals, Disp: 28 tablet, Rfl: 0    sertraline (ZOLOFT) 50 mg tablet, Take 50 mg by mouth daily, Disp: , Rfl:     zolpidem (AMBIEN) 10 mg tablet, Take 1 tablet (10 mg total) by mouth daily at bedtime as needed for sleep, Disp: 30 tablet, Rfl: 0    No Known Allergies    Vitals:    04/19/21 1439   BP: 110/70   Pulse: 96   Resp: 16   SpO2: 98%   Weight: 50 3 kg (111 lb)   Height: 5' 1" (1 549 m)

## 2021-04-19 NOTE — ASSESSMENT & PLAN NOTE
Sleep medicine doctor suggested her to take Zoloft and now she started 50 mg as prescribed by him and she will follow-up with him in 1 month

## 2021-05-01 ENCOUNTER — IMMUNIZATIONS (OUTPATIENT)
Dept: FAMILY MEDICINE CLINIC | Facility: HOSPITAL | Age: 31
End: 2021-05-01

## 2021-05-01 DIAGNOSIS — Z23 ENCOUNTER FOR IMMUNIZATION: Primary | ICD-10-CM

## 2021-05-01 PROCEDURE — 0001A SARS-COV-2 / COVID-19 MRNA VACCINE (PFIZER-BIONTECH) 30 MCG: CPT

## 2021-05-01 PROCEDURE — 91300 SARS-COV-2 / COVID-19 MRNA VACCINE (PFIZER-BIONTECH) 30 MCG: CPT

## 2021-05-22 ENCOUNTER — IMMUNIZATIONS (OUTPATIENT)
Dept: FAMILY MEDICINE CLINIC | Facility: HOSPITAL | Age: 31
End: 2021-05-22

## 2021-05-22 DIAGNOSIS — Z23 ENCOUNTER FOR IMMUNIZATION: Primary | ICD-10-CM

## 2021-05-22 PROCEDURE — 91300 SARS-COV-2 / COVID-19 MRNA VACCINE (PFIZER-BIONTECH) 30 MCG: CPT

## 2021-05-22 PROCEDURE — 0002A SARS-COV-2 / COVID-19 MRNA VACCINE (PFIZER-BIONTECH) 30 MCG: CPT

## 2021-06-15 DIAGNOSIS — D50.9 IRON DEFICIENCY ANEMIA, UNSPECIFIED IRON DEFICIENCY ANEMIA TYPE: ICD-10-CM

## 2021-06-15 RX ORDER — FERROUS SULFATE TAB EC 324 MG (65 MG FE EQUIVALENT) 324 (65 FE) MG
324 TABLET DELAYED RESPONSE ORAL
Qty: 180 TABLET | Refills: 1 | Status: SHIPPED | OUTPATIENT
Start: 2021-06-15

## 2021-07-16 ENCOUNTER — OFFICE VISIT (OUTPATIENT)
Dept: OBGYN CLINIC | Facility: CLINIC | Age: 31
End: 2021-07-16
Payer: COMMERCIAL

## 2021-07-16 VITALS
SYSTOLIC BLOOD PRESSURE: 102 MMHG | WEIGHT: 115 LBS | BODY MASS INDEX: 21.71 KG/M2 | HEIGHT: 61 IN | DIASTOLIC BLOOD PRESSURE: 62 MMHG

## 2021-07-16 DIAGNOSIS — Z23 NEED FOR HPV VACCINE: ICD-10-CM

## 2021-07-16 DIAGNOSIS — Z01.419 WELL WOMAN EXAM WITH ROUTINE GYNECOLOGICAL EXAM: Primary | ICD-10-CM

## 2021-07-16 PROBLEM — Z13.6 SCREENING FOR CARDIOVASCULAR CONDITION: Status: RESOLVED | Noted: 2020-03-19 | Resolved: 2021-07-16

## 2021-07-16 PROCEDURE — 90651 9VHPV VACCINE 2/3 DOSE IM: CPT | Performed by: OBSTETRICS & GYNECOLOGY

## 2021-07-16 PROCEDURE — 1036F TOBACCO NON-USER: CPT | Performed by: OBSTETRICS & GYNECOLOGY

## 2021-07-16 PROCEDURE — 90471 IMMUNIZATION ADMIN: CPT | Performed by: OBSTETRICS & GYNECOLOGY

## 2021-07-16 PROCEDURE — 3008F BODY MASS INDEX DOCD: CPT | Performed by: OBSTETRICS & GYNECOLOGY

## 2021-07-16 PROCEDURE — 99395 PREV VISIT EST AGE 18-39: CPT | Performed by: OBSTETRICS & GYNECOLOGY

## 2021-07-16 NOTE — PROGRESS NOTES
ASSESSMENT & PLAN: Nargis Bustamante is a 35 y o  Alyson Range with normal gynecologic exam     1   Routine well woman exam done today  2    Pap and HPV: Pap with reflex HPV was not done today  Current ASCCP Guidelines reviewed  3   The patient declined STD testing  Safe sex practices have been discussed  4   Gardasil is recommended for patients from 526 years of age  The patient has not had the Gardasil vaccine series  Patient is interested in vaccination, first dose given today  Has had the covid vaccine! 5  The patient is not currently sexually active  She relies on bilateral salpingectomy for contraception and options have been discussed  6  The following were reviewed in today's visit: breast self exam, STD testing, osteoporosis, exercise and healthy diet  7  Patient to return to office in 12 months for annual exam      All questions have been answered to her satisfaction  CC:  Annual Gynecologic Examination    HPI: Nargis Bustamante is a 35 y o  Alyson Range who presents for annual gynecologic examination  She has the following concerns:  None  Interested in HPV vaccination today  Health Maintenance:    She exercises 0 days per week  She does perform regular monthly self breast exams  She feels safe at home  She does follow a well balanced diet - working on low cholesterol diet  She does not use tobacco    Past Medical History:   Diagnosis Date    Iron deficiency        Past Surgical History:   Procedure Laterality Date    CT LAP,RMV  ADNEXAL STRUCTURE Bilateral 9/1/2020    Procedure: LAPAROSCOPIC SALPINGECTOMY;  Surgeon: Duane Robert, DO;  [facility];  Service: Gynecology    TUBAL LIGATION      WISDOM TOOTH EXTRACTION         Past OB/Gyn History:      Period Cycle (Days): 30  Period Duration (Days): 5-6  Period Pattern: Regular  Menstrual Flow: Moderate, Light  Dysmenorrhea: (!) Mild  Dysmenorrhea Symptoms: CrampingPatient's last menstrual period was 07/14/2021      Patient is not currently sexually active  heterosexual Birth control: tubal ligation  Last Pap 3/2020 :  no abnormalities        Family History:  Family History   Problem Relation Age of Onset    Hypertension Mother     Endometrial cancer Mother     Hyperlipidemia Mother     Stroke Mother     Hypertension Father     Autism Brother     Other Paternal Grandmother         lymes    No Known Problems Paternal Grandfather     No Known Problems Half-Brother     No Known Problems Half-Brother        Social History:  Social History     Socioeconomic History    Marital status: Single     Spouse name: Not on file    Number of children: Not on file    Years of education: 15    Highest education level: Not on file   Occupational History    Occupation:    Tobacco Use    Smoking status: Never Smoker    Smokeless tobacco: Never Used   Vaping Use    Vaping Use: Never used   Substance and Sexual Activity    Alcohol use: Yes     Comment: occasionally     Drug use: Never    Sexual activity: Not Currently     Birth control/protection: Female Sterilization   Other Topics Concern    Not on file   Social History Narrative    Most recent tobacco use screening: 10-    Do you currently or have you served in the [employer]: No    Were you activated, into active duty, as a member of the [employer] or as a Reservist: No    Occupation: [employer] [address]    Education: 15    student college    Marital status: Single    Sexual orientation: Heterosexual    Exercise level: Occasional    Diet: Regular    General stress level: Medium    Alcohol intake: Occasional    Caffeine intake: Occasional    Chewing tobacco: none    Illicit drugs: none    Guns present in home: No    Seat belts used routinely: Yes    Sunscreen used routinely: No    Smoke alarm in home: Yes    Advance directive: No    Salt Intake: moderate    Has the Patient had a mammogram to screen for breast cancer within 24 months: No    Would the patient like to s Social Determinants of Health     Financial Resource Strain:     Difficulty of Paying Living Expenses:    Food Insecurity:     Worried About Running Out of Food in the Last Year:     [address] in the Last Year:    Transportation Needs:     Lack of Transportation (Medical):  Lack of Transportation (Non-Medical):    Physical Activity:     Days of Exercise per Week:     Minutes of Exercise per Session:    Stress:     Feeling of Stress :    Social Connections:     Frequency of Communication with Friends and Family:     Frequency of Social Gatherings with Friends and Family:     Attends Islam Services:     Active Member of Clubs or Organizations:     Attends Club or Organization Meetings:     Marital Status:    Intimate Partner Violence:     Fear of Current or Ex-Partner:     Emotionally Abused:     Physically Abused:     Sexually Abused:      Presently lives with her parents and brother  Patient is single  Patient is currently employed    No Known Allergies    Current Outpatient Medications:     ferrous sulfate 324 (65 Fe) mg, TAKE 1 TABLET (324 MG TOTAL) BY MOUTH 2 (TWO) TIMES A DAY BEFORE MEALS, Disp: 180 tablet, Rfl: 1    naproxen (NAPROSYN) 500 mg tablet, Take 1 tablet (500 mg total) by mouth 2 (two) times a day with meals, Disp: 28 tablet, Rfl: 0    sertraline (ZOLOFT) 50 mg tablet, Take 50 mg by mouth daily (Patient not taking: Reported on 7/16/2021), Disp: , Rfl:     zolpidem (AMBIEN) 10 mg tablet, Take 1 tablet (10 mg total) by mouth daily at bedtime as needed for sleep (Patient not taking: Reported on 7/16/2021), Disp: 30 tablet, Rfl: 0    Review of Systems:  Denies fevers, chills, unintentional weight loss, excessive fatigue, chest pain, shortness of breath, abdominal pain, nausea, vomiting, urinary incontinence, urinary frequency, vaginal bleeding, vaginal discharge  All other systems negative unless otherwise stated       Physical Exam:  /62 (BP Location: Left arm, Patient Position: Sitting, Cuff Size: Large)   Ht 5' 1" (1 549 m)   Wt 52 2 kg (115 lb)   LMP 07/14/2021   BMI 21 73 kg/m²  Body mass index is 21 73 kg/m²  GEN: The patient was alert and oriented x3, pleasant well-appearing female in no acute distress  HEENT:  Unremarkable, no anterior or posterior lymphadenopathy, no thyromegaly  CV:  Regular rate and rhythm, normal S1 and S2, no murmurs  RESP:  Clear to auscultation bilaterally, no wheezes, rales or rhonchi  BREAST:  Symmetric breasts with no palpable breast masses or obvious breast lesions  She has no retractions or nipple discharge  She has no axillary abnormalities or palpable masses  GI:  Soft, nontender, non-distended  MSK: bilateral lower extremities are nontender, no edema  : Normal appearing external female genitalia, normal appearing urethral meatus  On bimanual exam, no cervical motion tenderness; uterus is smooth, mobile, nontender 6 weeks size  No tenderness or fullness in the bilateral adnexa

## 2021-08-20 ENCOUNTER — RA CDI HCC (OUTPATIENT)
Dept: OTHER | Facility: HOSPITAL | Age: 31
End: 2021-08-20

## 2021-08-20 NOTE — PROGRESS NOTES
[facility]  coding opportunities       Chart reviewed, no opportunity found: CHART REVIEWED, NO OPPORTUNITY FOUND                        Patients insurance company: [employer] (Medicare Advantage and Commercial)

## 2021-09-20 NOTE — PROGRESS NOTES
Gardasil    Counseled by provider?: 3/19/20  Injection # in series: 2  Last Inj  In series given?: 7/16/21  Literature Given: Given Previously  Pt  tolerate well?: Yes  Inj  Site today?: Left Deltoid  1st inj , did patient wait 10-15mins?: No, N/A  Next Gardasil due: 4mo   From today     Given by: John Mendoza  Lot#: [id number]  Exp: [id number]  LVB:[id number]  Manufacture: [employer]

## 2021-09-23 ENCOUNTER — CLINICAL SUPPORT (OUTPATIENT)
Dept: OBGYN CLINIC | Facility: CLINIC | Age: 31
End: 2021-09-23
Payer: COMMERCIAL

## 2021-09-23 VITALS — DIASTOLIC BLOOD PRESSURE: 62 MMHG | SYSTOLIC BLOOD PRESSURE: 100 MMHG | WEIGHT: 120.4 LBS | BODY MASS INDEX: 22.75 KG/M2

## 2021-09-23 DIAGNOSIS — Z23 NEED FOR HPV VACCINATION: Primary | ICD-10-CM

## 2021-09-23 PROCEDURE — 90471 IMMUNIZATION ADMIN: CPT | Performed by: NURSE PRACTITIONER

## 2021-09-23 PROCEDURE — 90651 9VHPV VACCINE 2/3 DOSE IM: CPT | Performed by: NURSE PRACTITIONER

## 2021-12-09 ENCOUNTER — OFFICE VISIT (OUTPATIENT)
Dept: FAMILY MEDICINE CLINIC | Facility: CLINIC | Age: 31
End: 2021-12-09
Payer: COMMERCIAL

## 2021-12-09 VITALS
SYSTOLIC BLOOD PRESSURE: 122 MMHG | BODY MASS INDEX: 22.66 KG/M2 | HEART RATE: 68 BPM | HEIGHT: 61 IN | RESPIRATION RATE: 16 BRPM | WEIGHT: 120 LBS | OXYGEN SATURATION: 98 % | DIASTOLIC BLOOD PRESSURE: 72 MMHG

## 2021-12-09 DIAGNOSIS — D50.9 IRON DEFICIENCY ANEMIA, UNSPECIFIED IRON DEFICIENCY ANEMIA TYPE: ICD-10-CM

## 2021-12-09 DIAGNOSIS — M54.2 NECK PAIN: ICD-10-CM

## 2021-12-09 DIAGNOSIS — G47.00 INSOMNIA, UNSPECIFIED TYPE: Primary | ICD-10-CM

## 2021-12-09 DIAGNOSIS — E78.2 MIXED HYPERLIPIDEMIA: ICD-10-CM

## 2021-12-09 DIAGNOSIS — Z23 NEEDS FLU SHOT: ICD-10-CM

## 2021-12-09 PROCEDURE — 90471 IMMUNIZATION ADMIN: CPT | Performed by: FAMILY MEDICINE

## 2021-12-09 PROCEDURE — 3008F BODY MASS INDEX DOCD: CPT | Performed by: FAMILY MEDICINE

## 2021-12-09 PROCEDURE — 1036F TOBACCO NON-USER: CPT | Performed by: FAMILY MEDICINE

## 2021-12-09 PROCEDURE — 99214 OFFICE O/P EST MOD 30 MIN: CPT | Performed by: FAMILY MEDICINE

## 2021-12-09 PROCEDURE — 90686 IIV4 VACC NO PRSV 0.5 ML IM: CPT | Performed by: FAMILY MEDICINE

## 2021-12-09 RX ORDER — ZOLPIDEM TARTRATE 5 MG/1
5 TABLET ORAL
Qty: 30 TABLET | Refills: 0 | Status: SHIPPED | OUTPATIENT
Start: 2021-12-09 | End: 2022-02-11 | Stop reason: SDUPTHER

## 2022-01-19 ENCOUNTER — APPOINTMENT (OUTPATIENT)
Dept: LAB | Facility: CLINIC | Age: 32
End: 2022-01-19
Payer: COMMERCIAL

## 2022-01-19 DIAGNOSIS — D50.9 IRON DEFICIENCY ANEMIA, UNSPECIFIED IRON DEFICIENCY ANEMIA TYPE: ICD-10-CM

## 2022-01-19 DIAGNOSIS — E78.2 MIXED HYPERLIPIDEMIA: ICD-10-CM

## 2022-01-19 LAB
BASOPHILS # BLD AUTO: 0.03 THOUSANDS/ΜL (ref 0–0.1)
BASOPHILS NFR BLD AUTO: 0 % (ref 0–1)
CHOLEST SERPL-MCNC: 242 MG/DL
EOSINOPHIL # BLD AUTO: 0.1 THOUSAND/ΜL (ref 0–0.61)
EOSINOPHIL NFR BLD AUTO: 1 % (ref 0–6)
ERYTHROCYTE [DISTWIDTH] IN BLOOD BY AUTOMATED COUNT: 15.1 % (ref 11.6–15.1)
HCT VFR BLD AUTO: 36.1 % (ref 34.8–46.1)
HDLC SERPL-MCNC: 56 MG/DL
HGB BLD-MCNC: 10.9 G/DL (ref 11.5–15.4)
IMM GRANULOCYTES # BLD AUTO: 0.03 THOUSAND/UL (ref 0–0.2)
IMM GRANULOCYTES NFR BLD AUTO: 0 % (ref 0–2)
LDLC SERPL CALC-MCNC: 170 MG/DL (ref 0–100)
LYMPHOCYTES # BLD AUTO: 2.41 THOUSANDS/ΜL (ref 0.6–4.47)
LYMPHOCYTES NFR BLD AUTO: 32 % (ref 14–44)
MCH RBC QN AUTO: 24.7 PG (ref 26.8–34.3)
MCHC RBC AUTO-ENTMCNC: 30.2 G/DL (ref 31.4–37.4)
MCV RBC AUTO: 82 FL (ref 82–98)
MONOCYTES # BLD AUTO: 0.61 THOUSAND/ΜL (ref 0.17–1.22)
MONOCYTES NFR BLD AUTO: 8 % (ref 4–12)
NEUTROPHILS # BLD AUTO: 4.35 THOUSANDS/ΜL (ref 1.85–7.62)
NEUTS SEG NFR BLD AUTO: 59 % (ref 43–75)
NONHDLC SERPL-MCNC: 186 MG/DL
NRBC BLD AUTO-RTO: 0 /100 WBCS
PLATELET # BLD AUTO: 243 THOUSANDS/UL (ref 149–390)
PMV BLD AUTO: 11.4 FL (ref 8.9–12.7)
RBC # BLD AUTO: 4.42 MILLION/UL (ref 3.81–5.12)
TRIGL SERPL-MCNC: 82 MG/DL
WBC # BLD AUTO: 7.53 THOUSAND/UL (ref 4.31–10.16)

## 2022-01-19 PROCEDURE — 85025 COMPLETE CBC W/AUTO DIFF WBC: CPT

## 2022-01-19 PROCEDURE — 36415 COLL VENOUS BLD VENIPUNCTURE: CPT

## 2022-01-19 PROCEDURE — 80061 LIPID PANEL: CPT

## 2022-01-25 ENCOUNTER — OFFICE VISIT (OUTPATIENT)
Dept: FAMILY MEDICINE CLINIC | Facility: CLINIC | Age: 32
End: 2022-01-25
Payer: COMMERCIAL

## 2022-01-25 VITALS
DIASTOLIC BLOOD PRESSURE: 70 MMHG | RESPIRATION RATE: 16 BRPM | WEIGHT: 117 LBS | HEIGHT: 61 IN | OXYGEN SATURATION: 99 % | BODY MASS INDEX: 22.09 KG/M2 | HEART RATE: 102 BPM | SYSTOLIC BLOOD PRESSURE: 120 MMHG

## 2022-01-25 DIAGNOSIS — F41.9 ANXIETY: ICD-10-CM

## 2022-01-25 DIAGNOSIS — D50.9 IRON DEFICIENCY ANEMIA, UNSPECIFIED IRON DEFICIENCY ANEMIA TYPE: ICD-10-CM

## 2022-01-25 DIAGNOSIS — E78.2 MIXED HYPERLIPIDEMIA: Primary | ICD-10-CM

## 2022-01-25 DIAGNOSIS — G47.00 INSOMNIA, UNSPECIFIED TYPE: ICD-10-CM

## 2022-01-25 PROBLEM — Z23 NEEDS FLU SHOT: Status: RESOLVED | Noted: 2021-01-06 | Resolved: 2022-01-25

## 2022-01-25 PROBLEM — M54.2 NECK PAIN: Status: RESOLVED | Noted: 2021-12-09 | Resolved: 2022-01-25

## 2022-01-25 PROCEDURE — 99214 OFFICE O/P EST MOD 30 MIN: CPT | Performed by: FAMILY MEDICINE

## 2022-01-25 PROCEDURE — 3008F BODY MASS INDEX DOCD: CPT | Performed by: FAMILY MEDICINE

## 2022-01-25 PROCEDURE — 1036F TOBACCO NON-USER: CPT | Performed by: FAMILY MEDICINE

## 2022-01-25 NOTE — ASSESSMENT & PLAN NOTE
Labs reviewed, her hemoglobin is low, she has to restart the iron therapy, she will keep taking iron sulfate daily, recheck labs in 6  Months

## 2022-01-25 NOTE — PROGRESS NOTES
Assessment/Plan:    Problem List Items Addressed This Visit        Other    Insomnia    Anxiety    Mixed hyperlipidemia - Primary     Her cholesterol has some improvement but still very high, she has family history of hyperlipidemia, discussed with her to work on more diet and exercise she is young she has no smoking history, blood pressure is normal she can continue with the lifestyle modifications and recheck labs in 6 months         Relevant Orders    CBC and differential    Comprehensive metabolic panel    Lipid panel    Iron deficiency anemia     Labs reviewed, her hemoglobin is low, she has to restart the iron therapy, she will keep taking iron sulfate daily, recheck labs in 6  Months  Relevant Orders    CBC and differential          Return in about 6 months (around 7/25/2022) for Recheck  Chief Complaint   Patient presents with    Follow-up       Subjective:   Patient ID: Leslie Medina is a 28 y o  female  She is here follow-up on labs, she says she take Ambien only sometimes and some days she sleeps well some days she is not but overall she is feeling better anxiety is much better, she has iron deficiency anemia and she says she had stop taking iron for long time she gets her monthly periods and since she saw her hemoglobin is low she restarted her iron therapy now      Review of Systems   Constitutional: Negative for activity change, appetite change, chills, fatigue, fever and unexpected weight change  HENT: Negative for congestion, ear discharge, ear pain, nosebleeds, postnasal drip, rhinorrhea, sinus pressure, sneezing, sore throat, trouble swallowing and voice change  Eyes: Negative for photophobia, pain, discharge, redness and itching  Respiratory: Negative for cough, chest tightness, shortness of breath and wheezing  Cardiovascular: Negative for chest pain, palpitations and leg swelling     Gastrointestinal: Negative for abdominal pain, constipation, diarrhea, nausea and vomiting  Endocrine: Negative for polyuria  Genitourinary: Negative for dysuria, frequency and urgency  Musculoskeletal: Negative for arthralgias, back pain, myalgias and neck pain  Skin: Negative for color change, pallor and rash  Allergic/Immunologic: Negative for environmental allergies and food allergies  Neurological: Negative for dizziness, weakness, light-headedness and headaches  Hematological: Negative for adenopathy  Does not bruise/bleed easily  Psychiatric/Behavioral: Negative for behavioral problems  The patient is not nervous/anxious  Objective:  Physical Exam  Vitals and nursing note reviewed  Constitutional:       Appearance: She is well-developed  HENT:      Head: Normocephalic and atraumatic  Eyes:      General: No scleral icterus  Right eye: No discharge  Left eye: No discharge  Extraocular Movements: Extraocular movements intact  Conjunctiva/sclera: Conjunctivae normal    Neck:      Thyroid: No thyromegaly  Cardiovascular:      Rate and Rhythm: Normal rate and regular rhythm  Heart sounds: Normal heart sounds  No murmur heard  Pulmonary:      Effort: Pulmonary effort is normal       Breath sounds: Normal breath sounds  No wheezing or rales  Abdominal:      General: There is no distension  Palpations: Abdomen is soft  There is no mass  Musculoskeletal:      Cervical back: Normal range of motion and neck supple  Right lower leg: No edema  Left lower leg: No edema  Lymphadenopathy:      Cervical: No cervical adenopathy  Skin:     Findings: No erythema or rash  Neurological:      General: No focal deficit present  Mental Status: She is alert     Psychiatric:         Mood and Affect: Mood normal             Past Surgical History:   Procedure Laterality Date    [place] LAP,RMV  ADNEXAL STRUCTURE Bilateral 9/1/2020    Procedure: LAPAROSCOPIC SALPINGECTOMY;  Surgeon: Jonathan Hilario DO;  [facility]; Service: Gynecology    TUBAL LIGATION      WISDOM TOOTH EXTRACTION         Family History   Problem Relation Age of Onset    Hypertension Mother     Endometrial cancer Mother     Hyperlipidemia Mother     Stroke Mother     Hypertension Father     Autism Brother     Other Paternal Grandmother         lymes    No Known Problems Paternal Grandfather     No Known Problems Half-Brother     No Known Problems Half-Brother          Current Outpatient Medications:     ferrous sulfate 324 (65 Fe) mg, TAKE 1 TABLET (324 MG TOTAL) BY MOUTH 2 (TWO) TIMES A DAY BEFORE MEALS, Disp: 180 tablet, Rfl: 1    zolpidem (AMBIEN) 5 mg tablet, Take 1 tablet (5 mg total) by mouth daily at bedtime as needed for sleep, Disp: 30 tablet, Rfl: 0    No Known Allergies    Vitals:    01/25/22 1737   BP: 120/70   Pulse: 102   Resp: 16   SpO2: 99%   Weight: 53 1 kg (117 lb)   Height: 5' 1" (1 549 m)

## 2022-01-25 NOTE — ASSESSMENT & PLAN NOTE
Her cholesterol has some improvement but still very high, she has family history of hyperlipidemia, discussed with her to work on more diet and exercise she is young she has no smoking history, blood pressure is normal she can continue with the lifestyle modifications and recheck labs in 6 months

## 2022-02-11 DIAGNOSIS — G47.00 INSOMNIA, UNSPECIFIED TYPE: ICD-10-CM

## 2022-02-12 RX ORDER — ZOLPIDEM TARTRATE 5 MG/1
5 TABLET ORAL
Qty: 30 TABLET | Refills: 0 | Status: SHIPPED | OUTPATIENT
Start: 2022-02-12 | End: 2022-05-05 | Stop reason: SDUPTHER

## 2022-02-20 ENCOUNTER — APPOINTMENT (EMERGENCY)
Dept: RADIOLOGY | Facility: HOSPITAL | Age: 32
End: 2022-02-20
Payer: COMMERCIAL

## 2022-02-20 ENCOUNTER — HOSPITAL ENCOUNTER (EMERGENCY)
Facility: HOSPITAL | Age: 32
Discharge: HOME | End: 2022-02-20
Attending: EMERGENCY MEDICINE
Payer: COMMERCIAL

## 2022-02-20 VITALS
OXYGEN SATURATION: 99 % | RESPIRATION RATE: 17 BRPM | TEMPERATURE: 98.1 F | HEART RATE: 98 BPM | DIASTOLIC BLOOD PRESSURE: 67 MMHG | SYSTOLIC BLOOD PRESSURE: 101 MMHG

## 2022-02-20 DIAGNOSIS — R07.9 CHEST PAIN IN ADULT: Primary | ICD-10-CM

## 2022-02-20 LAB
ALBUMIN SERPL BCP-MCNC: 4.7 G/DL (ref 3.4–4.8)
ALP SERPL-CCNC: 49.5 U/L (ref 35–140)
ALT SERPL W P-5'-P-CCNC: 8 U/L (ref 5–54)
ANION GAP SERPL CALCULATED.3IONS-SCNC: 7 MMOL/L (ref 4–13)
AST SERPL W P-5'-P-CCNC: 19 U/L (ref 15–41)
BASOPHILS # BLD AUTO: 0.05 THOUSANDS/ΜL (ref 0–0.1)
BASOPHILS NFR BLD AUTO: 1 % (ref 0–1)
BILIRUB SERPL-MCNC: 0.26 MG/DL (ref 0.3–1.2)
BNP SERPL-MCNC: 26.7 PG/ML (ref 1–100)
BUN SERPL-MCNC: 15 MG/DL (ref 6–20)
CALCIUM SERPL-MCNC: 9.9 MG/DL (ref 8.4–10.2)
CARDIAC TROPONIN I PNL SERPL HS: <2 NG/L
CARDIAC TROPONIN I PNL SERPL HS: <2 NG/L
CHLORIDE SERPL-SCNC: 101 MMOL/L (ref 96–108)
CO2 SERPL-SCNC: 28 MMOL/L (ref 22–33)
CREAT SERPL-MCNC: 0.63 MG/DL (ref 0.4–1.1)
D DIMER PPP FEU-MCNC: 0.41 UG/ML FEU
EOSINOPHIL # BLD AUTO: 0.06 THOUSAND/ΜL (ref 0–0.61)
EOSINOPHIL NFR BLD AUTO: 1 % (ref 0–6)
ERYTHROCYTE [DISTWIDTH] IN BLOOD BY AUTOMATED COUNT: 14.7 % (ref 11.6–15.1)
GFR SERPL CREATININE-BSD FRML MDRD: 120 ML/MIN/1.73SQ M
GLUCOSE SERPL-MCNC: 107 MG/DL (ref 65–140)
HCG SERPL QL: NEGATIVE
HCT VFR BLD AUTO: 34.8 % (ref 34.8–46.1)
HGB BLD-MCNC: 10.4 G/DL (ref 11.5–15.4)
IMM GRANULOCYTES # BLD AUTO: 0.02 THOUSAND/UL (ref 0–0.2)
IMM GRANULOCYTES NFR BLD AUTO: 0 % (ref 0–2)
LYMPHOCYTES # BLD AUTO: 2.17 THOUSANDS/ΜL (ref 0.6–4.47)
LYMPHOCYTES NFR BLD AUTO: 29 % (ref 14–44)
MAGNESIUM SERPL-MCNC: 1.9 MG/DL (ref 1.6–2.6)
MCH RBC QN AUTO: 25.5 PG (ref 26.8–34.3)
MCHC RBC AUTO-ENTMCNC: 29.9 G/DL (ref 31.4–37.4)
MCV RBC AUTO: 85 FL (ref 82–98)
MONOCYTES # BLD AUTO: 0.41 THOUSAND/ΜL (ref 0.17–1.22)
MONOCYTES NFR BLD AUTO: 5 % (ref 4–12)
NEUTROPHILS # BLD AUTO: 4.83 THOUSANDS/ΜL (ref 1.85–7.62)
NEUTS SEG NFR BLD AUTO: 64 % (ref 43–75)
NRBC BLD AUTO-RTO: 0 /100 WBCS
PLATELET # BLD AUTO: 270 THOUSANDS/UL (ref 149–390)
PMV BLD AUTO: 11.3 FL (ref 8.9–12.7)
POTASSIUM SERPL-SCNC: 3.8 MMOL/L (ref 3.5–5)
PROT SERPL-MCNC: 7.4 G/DL (ref 6.4–8.3)
RBC # BLD AUTO: 4.08 MILLION/UL (ref 3.81–5.12)
SODIUM SERPL-SCNC: 136 MMOL/L (ref 133–145)
WBC # BLD AUTO: 7.54 THOUSAND/UL (ref 4.31–10.16)

## 2022-02-20 PROCEDURE — 80053 COMPREHEN METABOLIC PANEL: CPT | Performed by: EMERGENCY MEDICINE

## 2022-02-20 PROCEDURE — 71045 X-RAY EXAM CHEST 1 VIEW: CPT

## 2022-02-20 PROCEDURE — 96360 HYDRATION IV INFUSION INIT: CPT

## 2022-02-20 PROCEDURE — 99285 EMERGENCY DEPT VISIT HI MDM: CPT

## 2022-02-20 PROCEDURE — 99285 EMERGENCY DEPT VISIT HI MDM: CPT | Performed by: EMERGENCY MEDICINE

## 2022-02-20 PROCEDURE — 83735 ASSAY OF MAGNESIUM: CPT | Performed by: EMERGENCY MEDICINE

## 2022-02-20 PROCEDURE — 85025 COMPLETE CBC W/AUTO DIFF WBC: CPT | Performed by: EMERGENCY MEDICINE

## 2022-02-20 PROCEDURE — 96361 HYDRATE IV INFUSION ADD-ON: CPT

## 2022-02-20 PROCEDURE — 83880 ASSAY OF NATRIURETIC PEPTIDE: CPT | Performed by: EMERGENCY MEDICINE

## 2022-02-20 PROCEDURE — 84703 CHORIONIC GONADOTROPIN ASSAY: CPT | Performed by: EMERGENCY MEDICINE

## 2022-02-20 PROCEDURE — 36415 COLL VENOUS BLD VENIPUNCTURE: CPT | Performed by: EMERGENCY MEDICINE

## 2022-02-20 PROCEDURE — 93005 ELECTROCARDIOGRAM TRACING: CPT

## 2022-02-20 PROCEDURE — 84484 ASSAY OF TROPONIN QUANT: CPT | Performed by: EMERGENCY MEDICINE

## 2022-02-20 PROCEDURE — 85379 FIBRIN DEGRADATION QUANT: CPT | Performed by: EMERGENCY MEDICINE

## 2022-02-20 RX ADMIN — SODIUM CHLORIDE 1000 ML: 0.9 INJECTION, SOLUTION INTRAVENOUS at 01:55

## 2022-02-20 NOTE — ED PROVIDER NOTES
History  Chief Complaint   Patient presents with    Chest Pain     Pt presents to the ED with c/o chest pain, dizziness  and trouble breathing     Patient is a 26-year-old female seen in the emergency department with concern for chest pain and dizziness  Patient states that she was lying in bed when she developed dizziness, feeling of near-syncope, and chest discomfort  Patient notes no radiation of the pain  Patient notes central chest pain, which is intermittent  Patient notes no definite clear exacerbating or alleviating factors for her symptoms  Patient notes no nausea, vomiting, abdominal pain, weakness, numbness, tingling  Patient noted some shortness of breath due to the chest tightness as well, but states that the shortness of breath is now resolved  Patient notes no recent travel, leg swelling, or hemoptysis  Patient states that she has been taking Ambien over approximately the past 2 weeks, but did not take Ambien this evening  Prior to Admission Medications   Prescriptions Last Dose Informant Patient Reported?  Taking?   ferrous sulfate 324 (65 Fe) mg  Self No No   Sig: TAKE 1 TABLET (324 MG TOTAL) BY MOUTH 2 (TWO) TIMES A DAY BEFORE MEALS   zolpidem (AMBIEN) 5 mg tablet   No No   Sig: Take 1 tablet (5 mg total) by mouth daily at bedtime as needed for sleep      Facility-Administered Medications: None       Past Medical History:   Diagnosis Date    Iron deficiency        Past Surgical History:   Procedure Laterality Date    [place] LAP,RMV  ADNEXAL STRUCTURE Bilateral 9/1/2020    Procedure: LAPAROSCOPIC SALPINGECTOMY;  Surgeon: Neelima Samson DO;  [facility];  Service: Gynecology    TUBAL LIGATION      WISDOM TOOTH EXTRACTION         Family History   Problem Relation Age of Onset    Hypertension Mother     Endometrial cancer Mother     Hyperlipidemia Mother     Stroke Mother     Hypertension Father     Autism Brother     Other Paternal Grandmother         lymes    No Known Problems Paternal Grandfather     No Known Problems Half-Brother     No Known Problems Half-Brother      I have reviewed and agree with the history as documented  E-Cigarette/Vaping    E-Cigarette Use Never User      E-Cigarette/Vaping Substances    Nicotine No     THC No     CBD No     Flavoring No     Other No     Unknown No      Social History     Tobacco Use    Smoking status: Never Smoker    Smokeless tobacco: Never Used   Vaping Use    Vaping Use: Never used   Substance Use Topics    Alcohol use: Yes     Comment: occasionally     Drug use: Never       Review of Systems   Constitutional: Negative for chills and fever  HENT: Negative for ear pain and sore throat  Eyes: Negative for pain and visual disturbance  Respiratory: Positive for shortness of breath  Negative for cough  Cardiovascular: Negative for chest pain and palpitations  Gastrointestinal: Negative for abdominal pain and vomiting  Genitourinary: Negative for decreased urine volume and difficulty urinating  Musculoskeletal: Negative for arthralgias and back pain  Skin: Negative for color change and rash  Neurological: Negative for seizures and syncope  Psychiatric/Behavioral: Negative for agitation and confusion  All other systems reviewed and are negative  Physical Exam  Physical Exam  Vitals and nursing note reviewed  Constitutional:       Appearance: She is well-developed  She is not ill-appearing  HENT:      Head: Normocephalic and atraumatic  Right Ear: External ear normal       Left Ear: External ear normal       Nose: Nose normal       Mouth/Throat:      Pharynx: Oropharynx is clear  Eyes:      General: No scleral icterus  Conjunctiva/sclera: Conjunctivae normal    Cardiovascular:      Rate and Rhythm: Normal rate and regular rhythm  Heart sounds: No murmur heard  Pulmonary:      Effort: Pulmonary effort is normal  No respiratory distress        Breath sounds: Normal breath sounds  Abdominal:      General: There is no distension  Palpations: Abdomen is soft  Tenderness: There is no abdominal tenderness  Musculoskeletal:         General: No deformity or signs of injury  Cervical back: Normal range of motion and neck supple  Skin:     General: Skin is warm and dry  Neurological:      General: No focal deficit present  Mental Status: She is alert  Cranial Nerves: No cranial nerve deficit  Sensory: No sensory deficit  Psychiatric:         Behavior: Behavior normal          Thought Content:  Thought content normal       Comments: appears anxious         Vital Signs  ED Triage Vitals [02/20/22 0133]   Temperature Pulse Respirations Blood Pressure SpO2   98 1 °F (36 7 °C) 89 18 105/64 100 %      Temp Source Heart Rate Source Patient Position - Orthostatic VS BP Location FiO2 (%)   Oral Monitor Lying Left arm --      Pain Score       4           Vitals:    02/20/22 0133   BP: 105/64   Pulse: 89   Patient Position - Orthostatic VS: Lying         Visual Acuity      ED Medications  Medications   sodium chloride 0 9 % bolus 1,000 mL (1,000 mL Intravenous New Bag 2/20/22 0155)       Diagnostic Studies  Results Reviewed     Procedure Component Value Units Date/Time    HS Troponin I 2hr [[id number]] Collected: 02/20/22 0247    Lab Status: Final result Specimen: Blood from Arm, Right Updated: 02/20/22 0319     hs TnI 2hr <2 ng/L      Delta 2hr hsTnI --    Comprehensive metabolic panel [[id number]]  (Abnormal) Collected: 02/20/22 0143    Lab Status: Final result Specimen: Blood Updated: 02/20/22 0230     Sodium 136 mmol/L      Potassium 3 8 mmol/L      Chloride 101 mmol/L      CO2 28 mmol/L      ANION GAP 7 mmol/L      BUN 15 mg/dL      Creatinine 0 63 mg/dL      Glucose 107 mg/dL      Calcium 9 9 mg/dL      AST 19 U/L      ALT 8 U/L      Alkaline Phosphatase 49 5 U/L      Total Protein 7 4 g/dL      Albumin 4 7 g/dL      Total Bilirubin 0 26 mg/dL      eGFR 120 ml/min/1 73sq m     Narrative:      National Kidney Disease Foundation guidelines for Chronic Kidney Disease (CKD):     Stage 1 with normal or high GFR (GFR > 90 mL/min/1 73 square meters)    Stage 2 Mild CKD (GFR = 60-89 mL/min/1 73 square meters)    Stage 3A Moderate CKD (GFR = 45-59 mL/min/1 73 square meters)    Stage 3B Moderate CKD (GFR = 30-44 mL/min/1 73 square meters)    Stage 4 Severe CKD (GFR = 15-29 mL/min/1 73 square meters)    Stage 5 End Stage CKD (GFR <15 mL/min/1 73 square meters)  Note: GFR calculation is accurate only with a steady state creatinine    hCG, qualitative pregnancy [[id number]]  (Normal) Collected: 02/20/22 0143    Lab Status: Final result Specimen: Blood Updated: 02/20/22 0230     Preg, Serum Negative    Magnesium [[id number]]  (Normal) Collected: 02/20/22 0143    Lab Status: Final result Specimen: Blood Updated: 02/20/22 0230     Magnesium 1 9 mg/dL     B-Type Natriuretic Peptide(BNP) CA, GH, EA Campuses Only [[id number]]  (Normal) Collected: 02/20/22 0143    Lab Status: Final result Specimen: Blood from Arm, Right Updated: 02/20/22 0227     BNP 26 7 pg/mL     HS Troponin 0hr (reflex protocol) [[id number]]  (Normal) Collected: 02/20/22 0143    Lab Status: Final result Specimen: Blood from Arm, Right Updated: 02/20/22 0221     hs TnI 0hr <2 ng/L     D-dimer, quantitative [[id number]]  (Normal) Collected: 02/20/22 0143    Lab Status: Final result Specimen: Blood from Arm, Right Updated: 02/20/22 0210     D-Dimer, Quant 0 41 ug/ml FEU     CBC and differential [[id number]]  (Abnormal) Collected: 02/20/22 0143    Lab Status: Final result Specimen: Blood from Arm, Right Updated: 02/20/22 0159     WBC 7 54 Thousand/uL      RBC 4 08 Million/uL      Hemoglobin 10 4 g/dL      Hematocrit 34 8 %      MCV 85 fL      MCH 25 5 pg      MCHC 29 9 g/dL      RDW 14 7 %      MPV 11 3 fL      Platelets 205 Thousands/uL      nRBC 0 /100 WBCs      Neutrophils Relative 64 %      Immat GRANS % 0 % Lymphocytes Relative 29 %      Monocytes Relative 5 %      Eosinophils Relative 1 %      Basophils Relative 1 %      Neutrophils Absolute 4 83 Thousands/µL      Immature Grans Absolute 0 02 Thousand/uL      Lymphocytes Absolute 2 17 Thousands/µL      Monocytes Absolute 0 41 Thousand/µL      Eosinophils Absolute 0 06 Thousand/µL      Basophils Absolute 0 05 Thousands/µL     POCT pregnancy, urine [[id number]]     Lab Status: No result                  XR chest 1 view portable   ED Interpretation by Nikky Camara MD (02/20 0222)   No infiltrate or pneumothorax                 Procedures  ECG 12 Lead Documentation Only    Date/Time: 2/20/2022 2:05 AM  Performed by: Nikky Camara MD  Authorized by: Nikky Camara MD     Indications / Diagnosis:  Chest pain  ECG reviewed by me, the ED Provider: yes    Patient location:  ED  Rate:     ECG rate:  90    ECG rate assessment: normal    Rhythm:     Rhythm: sinus rhythm    QRS:     QRS axis:  Normal  ST segments:     ST segments:  Non-specific  T waves:     T waves: non-specific    Comments:      Sinus rhythm at 90, normal axis, , QRS 82, QTc 451, nonspecific ST-T wave abnormality, no definite evidence of acute ischemia             ED Course                               SBIRT 22yo+      Most Recent Value   SBIRT (23 yo +)    In order to provide better care to our patients, we are screening all of our patients for alcohol and drug use  Would it be okay to ask you these screening questions? No Filed at: 02/20/2022 0136                    [facility]  Number of Diagnoses or Management Options  Chest pain in adult  Diagnosis management comments: Patient is a 81-year-old female seen in the emergency department with concern for chest pain  EKG was obtained and noted  Chest x-ray shows no infiltrate or pneumothorax  Patient had resolution of symptoms in the emergency department   Laboratory evaluation remarkable for D-dimer within normal limits, low hemoglobin of 10 4, serial troponins of less than 2, less than 2  No definite cause of the patient's symptoms was discovered  Evaluation is not consistent with ACS, PE, pneumothorax, or pneumonia  Patient's symptoms might be due to anxiety reaction  Plan to have patient follow up with PCP  Patient stable for discharge home  Discharge instructions were reviewed with patient  Amount and/or Complexity of Data Reviewed  Clinical lab tests: ordered and reviewed  Tests in the radiology section of CPT®: ordered and reviewed  Tests in the medicine section of CPT®: ordered and reviewed        Disposition  Final diagnoses:   Chest pain in adult     Time reflects when diagnosis was documented in both MDM as applicable and the Disposition within this note     Time User Action Codes Description Comment    2/20/2022  1:39 AM Grover Last Add [R07 9] Chest pain in adult       ED Disposition     ED Disposition Condition Date/Time Comment    Discharge Stable Sun Feb 20, 2022  3:22 AM [id number] [facility] discharge to home/self care  Follow-up Information     Follow up With Specialties Details Why Contact Info    Dorita Zacarias MD Family Medicine Call in 1 day  [address] [facility] [address]  [phone]            Patient's Medications   Discharge Prescriptions    No medications on file       No discharge procedures on file      PDMP Review       Value Time User    PDMP Reviewed  Yes 12/9/2021  5:16 PM Dorita Zacarias MD          ED Provider  Electronically Signed by           Az Vela MD  02/20/22 8995

## 2022-02-20 NOTE — ED NOTES
Pt states the chest pressure is subsiding, denies SOB/dizziness     Mary Monday, [facility]  02/20/22 0414

## 2022-02-21 LAB
ATRIAL RATE: 90 BPM
P AXIS: 66 DEGREES
PR INTERVAL: 184 MS
QRS AXIS: 65 DEGREES
QRSD INTERVAL: 82 MS
QT INTERVAL: 368 MS
QTC INTERVAL: 451 MS
T WAVE AXIS: 49 DEGREES
VENTRICULAR RATE: 90 BPM

## 2022-02-21 PROCEDURE — 93010 ELECTROCARDIOGRAM REPORT: CPT | Performed by: INTERNAL MEDICINE

## 2022-02-23 ENCOUNTER — OFFICE VISIT (OUTPATIENT)
Dept: FAMILY MEDICINE CLINIC | Facility: CLINIC | Age: 32
End: 2022-02-23
Payer: COMMERCIAL

## 2022-02-23 VITALS
BODY MASS INDEX: 22.09 KG/M2 | SYSTOLIC BLOOD PRESSURE: 110 MMHG | RESPIRATION RATE: 16 BRPM | HEIGHT: 61 IN | DIASTOLIC BLOOD PRESSURE: 64 MMHG | OXYGEN SATURATION: 99 % | HEART RATE: 96 BPM | WEIGHT: 117 LBS

## 2022-02-23 DIAGNOSIS — F41.0 PANIC ATTACK: Primary | ICD-10-CM

## 2022-02-23 DIAGNOSIS — D50.9 IRON DEFICIENCY ANEMIA, UNSPECIFIED IRON DEFICIENCY ANEMIA TYPE: ICD-10-CM

## 2022-02-23 PROCEDURE — 99214 OFFICE O/P EST MOD 30 MIN: CPT | Performed by: FAMILY MEDICINE

## 2022-02-23 PROCEDURE — 3008F BODY MASS INDEX DOCD: CPT | Performed by: FAMILY MEDICINE

## 2022-02-23 PROCEDURE — 1036F TOBACCO NON-USER: CPT | Performed by: FAMILY MEDICINE

## 2022-02-23 PROCEDURE — 3725F SCREEN DEPRESSION PERFORMED: CPT | Performed by: FAMILY MEDICINE

## 2022-02-23 RX ORDER — LORAZEPAM 0.5 MG/1
0.5 TABLET ORAL EVERY 8 HOURS PRN
Qty: 15 TABLET | Refills: 0 | Status: SHIPPED | OUTPATIENT
Start: 2022-02-23 | End: 2022-02-28

## 2022-02-23 NOTE — ASSESSMENT & PLAN NOTE
2 days ago she was taken to the ER because she had a severe panic attack, her labs and cardiac workup was negative, she says she never had this kind of panic attack but she has some history of anxious nervous and sleep disturbances in the past she was tried Zoloft and she did not like the effect of Zoloft, she also tried BuSpar and then she stop taking it she says she was taking Ambien but then she stop taking that too  Discussed with her to start her on long-term medication just back Lexapro to avoid any anxiety and panic attacks but she opted for p r n  medication, so I gave her prescription for the lorazepam in case she gets any high anxiety or panic attack and if she has to use that medicine every week then she should be on SSRIs, she agrees and she will follow-up

## 2022-02-23 NOTE — ASSESSMENT & PLAN NOTE
Her menstrual cycle is normal, no history of heavy blood clots, she remains slightly anemic she is taking 1 iron tablet daily, advised to increase it twice a day and will recheck labs in 2 months if she is still continue having anemia then she will be referred to Hematology  Discussed about the side effect of medication she can get constipation so she should take some stool softener to avoid that

## 2022-02-23 NOTE — PROGRESS NOTES
Assessment/Plan:    Problem List Items Addressed This Visit        Other    Iron deficiency anemia     Her menstrual cycle is normal, no history of heavy blood clots, she remains slightly anemic she is taking 1 iron tablet daily, advised to increase it twice a day and will recheck labs in 2 months if she is still continue having anemia then she will be referred to Hematology  Discussed about the side effect of medication she can get constipation so she should take some stool softener to avoid that         Relevant Orders    Ferritin    CBC and differential    TIBC    Panic attack - Primary     2 days ago she was taken to the ER because she had a severe panic attack, her labs and cardiac workup was negative, she says she never had this kind of panic attack but she has some history of anxious nervous and sleep disturbances in the past she was tried Zoloft and she did not like the effect of Zoloft, she also tried BuSpar and then she stop taking it she says she was taking Ambien but then she stop taking that too  Discussed with her to start her on long-term medication just back Lexapro to avoid any anxiety and panic attacks but she opted for p r n  medication, so I gave her prescription for the lorazepam in case she gets any high anxiety or panic attack and if she has to use that medicine every week then she should be on SSRIs, she agrees and she will follow-up         Relevant Medications    LORazepam (ATIVAN) 0 5 mg tablet         website checked and lorazepam given   Return in about 2 months (around 4/28/2022)  Chief Complaint   Patient presents with    Anxiety       Subjective:   Patient ID: Samantha Andres is a 28 y o  female    She says she was lying in the bed 2 days ago and was trying to sleep pending she start getting her anxiety she start sweating chest pain and she called 911,  she was taken to the ER because she had a severe panic attack, her labs and cardiac workup was negative, she says she never had this kind of panic attack but she has some history of anxious nervous and sleep disturbances in the past she was tried Zoloft and she did not like the effect of Zoloft, she also tried BuSpar and then she stop taking it she says she was taking Ambien but then she stop taking that too   Since she came back home she has no panic attack and her anxiety level has been stable  She works in a warehouse  And she does not think there is high stress  She also has history of anemia and she is currently taking 1 iron tablet daily  Review of Systems   Constitutional: Negative for activity change, appetite change, chills, fatigue, fever and unexpected weight change  HENT: Negative for congestion, ear discharge, ear pain, nosebleeds, postnasal drip, rhinorrhea, sinus pressure, sneezing, sore throat, trouble swallowing and voice change  Eyes: Negative for photophobia, pain, discharge, redness and itching  Respiratory: Negative for cough, chest tightness, shortness of breath and wheezing  Cardiovascular: Negative for chest pain, palpitations and leg swelling  Gastrointestinal: Negative for abdominal pain, constipation, diarrhea, nausea and vomiting  Endocrine: Negative for polyuria  Genitourinary: Negative for dysuria, frequency and urgency  Musculoskeletal: Negative for arthralgias, back pain, myalgias and neck pain  Skin: Negative for color change, pallor and rash  Allergic/Immunologic: Negative for environmental allergies and food allergies  Neurological: Negative for dizziness, weakness, light-headedness and headaches  Hematological: Negative for adenopathy  Does not bruise/bleed easily  Psychiatric/Behavioral: Negative for behavioral problems  The patient is not nervous/anxious  Objective:  Physical Exam  Vitals and nursing note reviewed  Constitutional:       Appearance: She is well-developed  HENT:      Head: Normocephalic and atraumatic  Eyes:      General: No scleral icterus  Conjunctiva/sclera: Conjunctivae normal       Pupils: Pupils are equal, round, and reactive to light  Neck:      Thyroid: No thyromegaly  Cardiovascular:      Rate and Rhythm: Normal rate and regular rhythm  Heart sounds: Normal heart sounds  No murmur heard  Pulmonary:      Effort: Pulmonary effort is normal       Breath sounds: Normal breath sounds  No wheezing or rales  Musculoskeletal:      Cervical back: Normal range of motion and neck supple  Right lower leg: No edema  Left lower leg: No edema  Lymphadenopathy:      Cervical: No cervical adenopathy  Skin:     Findings: No erythema or rash  Neurological:      General: No focal deficit present  Mental Status: She is alert  Psychiatric:         Mood and Affect: Mood normal          Behavior: Behavior normal          Thought Content:  Thought content normal          Judgment: Judgment normal             Past Surgical History:   Procedure Laterality Date    [place] LAP,RMV  ADNEXAL STRUCTURE Bilateral 9/1/2020    Procedure: LAPAROSCOPIC SALPINGECTOMY;  Surgeon: Perla Lopez DO;  [facility];  Service: Gynecology    TUBAL LIGATION      WISDOM TOOTH EXTRACTION         Family History   Problem Relation Age of Onset    Hypertension Mother     Endometrial cancer Mother     Hyperlipidemia Mother     Stroke Mother     Hypertension Father     Autism Brother     Other Paternal Grandmother         lymes    No Known Problems Paternal Grandfather     No Known Problems Half-Brother     No Known Problems Half-Brother          Current Outpatient Medications:     ferrous sulfate 324 (65 Fe) mg, TAKE 1 TABLET (324 MG TOTAL) BY MOUTH 2 (TWO) TIMES A DAY BEFORE MEALS, Disp: 180 tablet, Rfl: 1    zolpidem (AMBIEN) 5 mg tablet, Take 1 tablet (5 mg total) by mouth daily at bedtime as needed for sleep, Disp: 30 tablet, Rfl: 0    LORazepam (ATIVAN) 0 5 mg tablet, Take 1 tablet (0 5 mg total) by mouth every 8 (eight) hours as needed for anxiety for up to 5 days, Disp: 15 tablet, Rfl: 0    No Known Allergies    Vitals:    02/23/22 0755   BP: 110/64   Pulse: 96   Resp: 16   SpO2: 99%   Weight: 53 1 kg (117 lb)   Height: 5' 1" (1 549 m)

## 2022-04-25 ENCOUNTER — TELEPHONE (OUTPATIENT)
Dept: FAMILY MEDICINE CLINIC | Facility: CLINIC | Age: 32
End: 2022-04-25

## 2022-04-25 NOTE — TELEPHONE ENCOUNTER
Spoke with pt and she states she has had this since last week  She said as long as she doesn't do anything she is ok  She said she called 911 last week and they came and did ekg and told her that her heart was fine  She is still having symptoms  I told her that she should be seen at er to have cardiac enzymes done and other testing    She said she will have parents take her to er  Will keep her apt in schedule for tomorrow, she will call if she doesn't need it

## 2022-04-26 ENCOUNTER — OFFICE VISIT (OUTPATIENT)
Dept: FAMILY MEDICINE CLINIC | Facility: CLINIC | Age: 32
End: 2022-04-26
Payer: COMMERCIAL

## 2022-04-26 VITALS
WEIGHT: 115 LBS | OXYGEN SATURATION: 99 % | HEIGHT: 61 IN | HEART RATE: 79 BPM | RESPIRATION RATE: 16 BRPM | BODY MASS INDEX: 21.71 KG/M2 | SYSTOLIC BLOOD PRESSURE: 110 MMHG | DIASTOLIC BLOOD PRESSURE: 60 MMHG

## 2022-04-26 DIAGNOSIS — F41.9 ANXIETY: ICD-10-CM

## 2022-04-26 DIAGNOSIS — G47.00 INSOMNIA, UNSPECIFIED TYPE: ICD-10-CM

## 2022-04-26 DIAGNOSIS — F41.0 PANIC ATTACK: ICD-10-CM

## 2022-04-26 DIAGNOSIS — R07.9 CHEST PAIN, UNSPECIFIED TYPE: Primary | ICD-10-CM

## 2022-04-26 PROCEDURE — 3725F SCREEN DEPRESSION PERFORMED: CPT | Performed by: FAMILY MEDICINE

## 2022-04-26 PROCEDURE — 1036F TOBACCO NON-USER: CPT | Performed by: FAMILY MEDICINE

## 2022-04-26 PROCEDURE — 3008F BODY MASS INDEX DOCD: CPT | Performed by: FAMILY MEDICINE

## 2022-04-26 PROCEDURE — 99214 OFFICE O/P EST MOD 30 MIN: CPT | Performed by: FAMILY MEDICINE

## 2022-04-26 PROCEDURE — 93000 ELECTROCARDIOGRAM COMPLETE: CPT | Performed by: FAMILY MEDICINE

## 2022-04-26 NOTE — ASSESSMENT & PLAN NOTE
She has history of anxiety, recently she had some anxiety panic, with chest pain some tingling numbness in the hands and nauseated feeling, the ambulance was called she was checked and was told this just like anxiety but not taken to the ER, she is feeling improvement

## 2022-04-26 NOTE — PROGRESS NOTES
Assessment/Plan:    Problem List Items Addressed This Visit        Other    Insomnia     takes Ambien as needed         Anxiety     She has history of anxiety, recently she had some anxiety panic, with chest pain some tingling numbness in the hands and nauseated feeling, the ambulance was called she was checked and was told this just like anxiety but not taken to the ER, she is feeling improvement         Panic attack     She had a panic attack few days ago and she was seen by the squad as she call the 911 and her EKG was normal she was given the counselor in that she was having anxiety and panic and since then she has been feeling improved         Chest pain - Primary     EKG in the office is normal sinus rhythm  , her chest pain is due to high anxiety intermittently, discussed about taking the lorazepam as needed in the past we tried the Zoloft and then she did not feel well with the Zoloft, so it was stopped and BuSpar was tried for the anxiety and then it was stopped               Return in about 2 months (around 6/26/2022) for [address]  Chief Complaint   Patient presents with    Chest Pain    Headache    Back Pain       Subjective:   Patient ID: Samantha Parmar is a 28 y o  female  She says few days ago she was feeling chest tightness tingling numbness in the hands and nausea, she did not feel well and she went home from work, she then called the ambulance and she was checked, EKG was done and she was told she was having some anxiety, her heart was normal, she says since then she has been feeling slowly improvement, currently she is not taking any medication she has lorazepam at home but she never took it  The past she was also given the Zoloft and the BuSpar for her anxiety then she stopped as she did not want to take the medication  HPI    Review of Systems   Constitutional: Negative for activity change, appetite change, chills, fatigue, fever and unexpected weight change     HENT: Negative for congestion, ear discharge, ear pain, nosebleeds, postnasal drip, rhinorrhea, sinus pressure, sneezing, sore throat, trouble swallowing and voice change  Eyes: Negative for photophobia, pain, discharge, redness and itching  Respiratory: Negative for cough, chest tightness, shortness of breath and wheezing  Cardiovascular: Negative for chest pain, palpitations and leg swelling  Gastrointestinal: Negative for abdominal pain, constipation, diarrhea, nausea and vomiting  Endocrine: Negative for polyuria  Genitourinary: Negative for dysuria, frequency and urgency  Musculoskeletal: Negative for arthralgias, back pain, joint swelling, myalgias and neck pain  Skin: Negative for color change, pallor and rash  Allergic/Immunologic: Negative for environmental allergies and food allergies  Neurological: Negative for dizziness, weakness, light-headedness and headaches  Hematological: Negative for adenopathy  Does not bruise/bleed easily  Psychiatric/Behavioral: Negative for behavioral problems  The patient is nervous/anxious  Objective:  Physical Exam  Vitals and nursing note reviewed  Constitutional:       Appearance: She is well-developed  HENT:      Head: Normocephalic and atraumatic  Right Ear: External ear normal       Left Ear: External ear normal    Eyes:      General: No scleral icterus  Conjunctiva/sclera: Conjunctivae normal    Neck:      Thyroid: No thyromegaly  Cardiovascular:      Rate and Rhythm: Normal rate and regular rhythm  Heart sounds: Normal heart sounds  No murmur heard  Pulmonary:      Effort: Pulmonary effort is normal       Breath sounds: Normal breath sounds  No wheezing or rales  Chest:      Chest wall: No edema  Musculoskeletal:      Cervical back: Neck supple  Lymphadenopathy:      Cervical: No cervical adenopathy  Skin:     Findings: No erythema or rash  Neurological:      General: No focal deficit present        Mental Status: She is alert              Past Surgical History:   Procedure Laterality Date    [place] LAP,RMV  ADNEXAL STRUCTURE Bilateral 9/1/2020    Procedure: LAPAROSCOPIC SALPINGECTOMY;  Surgeon: Luke Rodriguez DO;  [facility];  Service: Gynecology    TUBAL LIGATION      WISDOM TOOTH EXTRACTION         Family History   Problem Relation Age of Onset    Hypertension Mother     Endometrial cancer Mother     Hyperlipidemia Mother     Stroke Mother     Hypertension Father     Autism Brother     Other Paternal Grandmother         lymes    No Known Problems Paternal Grandfather     No Known Problems Half-Brother     No Known Problems Half-Brother          Current Outpatient Medications:     ferrous sulfate 324 (65 Fe) mg, TAKE 1 TABLET (324 MG TOTAL) BY MOUTH 2 (TWO) TIMES A DAY BEFORE MEALS, Disp: 180 tablet, Rfl: 1    zolpidem (AMBIEN) 5 mg tablet, Take 1 tablet (5 mg total) by mouth daily at bedtime as needed for sleep, Disp: 30 tablet, Rfl: 0    LORazepam (ATIVAN) 0 5 mg tablet, Take 1 tablet (0 5 mg total) by mouth every 8 (eight) hours as needed for anxiety for up to 5 days (Patient not taking: Reported on 4/26/2022 ), Disp: 15 tablet, Rfl: 0    No Known Allergies    Vitals:    04/26/22 1055 04/26/22 1115   BP: 110/60    Pulse: (!) 109 79   Resp: 16    SpO2: 99%    Weight: 52 2 kg (115 lb)    Height: 5' 1" (1 549 m)

## 2022-04-26 NOTE — ASSESSMENT & PLAN NOTE
She had a panic attack few days ago and she was seen by the squad as she call the 911 and her EKG was normal she was given the counselor in that she was having anxiety and panic and since then she has been feeling improved

## 2022-04-26 NOTE — ASSESSMENT & PLAN NOTE
EKG in the office is normal sinus rhythm  , her chest pain is due to high anxiety intermittently, discussed about taking the lorazepam as needed in the past we tried the Zoloft and then she did not feel well with the Zoloft, so it was stopped and BuSpar was tried for the anxiety and then it was stopped

## 2022-05-05 DIAGNOSIS — G47.00 INSOMNIA, UNSPECIFIED TYPE: ICD-10-CM

## 2022-05-05 RX ORDER — ZOLPIDEM TARTRATE 5 MG/1
5 TABLET ORAL
Qty: 30 TABLET | Refills: 0 | Status: SHIPPED | OUTPATIENT
Start: 2022-05-05

## 2022-05-24 ENCOUNTER — OFFICE VISIT (OUTPATIENT)
Dept: FAMILY MEDICINE CLINIC | Facility: CLINIC | Age: 32
End: 2022-05-24
Payer: COMMERCIAL

## 2022-05-24 VITALS
HEIGHT: 61 IN | OXYGEN SATURATION: 99 % | SYSTOLIC BLOOD PRESSURE: 112 MMHG | RESPIRATION RATE: 16 BRPM | DIASTOLIC BLOOD PRESSURE: 76 MMHG | HEART RATE: 72 BPM | WEIGHT: 118.6 LBS | BODY MASS INDEX: 22.39 KG/M2

## 2022-05-24 DIAGNOSIS — F41.0 PANIC ATTACK: ICD-10-CM

## 2022-05-24 DIAGNOSIS — F41.9 ANXIETY: Primary | ICD-10-CM

## 2022-05-24 DIAGNOSIS — G47.00 INSOMNIA, UNSPECIFIED TYPE: ICD-10-CM

## 2022-05-24 PROCEDURE — 1036F TOBACCO NON-USER: CPT | Performed by: FAMILY MEDICINE

## 2022-05-24 PROCEDURE — 3008F BODY MASS INDEX DOCD: CPT | Performed by: FAMILY MEDICINE

## 2022-05-24 PROCEDURE — 3725F SCREEN DEPRESSION PERFORMED: CPT | Performed by: FAMILY MEDICINE

## 2022-05-24 PROCEDURE — 99213 OFFICE O/P EST LOW 20 MIN: CPT | Performed by: FAMILY MEDICINE

## 2022-05-24 RX ORDER — ESCITALOPRAM OXALATE 10 MG/1
10 TABLET ORAL DAILY
Qty: 90 TABLET | Refills: 3 | Status: SHIPPED | OUTPATIENT
Start: 2022-05-24

## 2022-05-24 NOTE — PROGRESS NOTES
Assessment/Plan:    Problem List Items Addressed This Visit        Other    Insomnia     Ambien as needed for insomnia           Anxiety - Primary     History of panic attacks and now almost daily high anxiety, in the past Zoloft and BuSpar was tried which did not help her much, will try the Lexapro, and she will see the counselor she can still take lorazepam as needed           Relevant Medications    escitalopram (Lexapro) 10 mg tablet    Panic attack          Return in about 10 days (around 6/3/2022) for Recheck  Chief Complaint   Patient presents with    Follow-up     Medication and anxiety       Subjective:   Patient ID: Latonya Lyon is a 28 y o  female  She says she has no panic attack and she has not use the lorazepam in last 1 month but she feels most of the days some anxiety he is trying to find a counselor she takes Ambien  to go to sleep few times a week  She avoid caffeine and she has been taking iron and eating healthy  She like to start some medication for day-to-day basis to keep her anxiety level down she has tried Zoloft and buspirone in the past which did not had the desirable effect  HPI    Review of Systems   Constitutional: Negative  HENT: Negative  Eyes: Negative  Respiratory: Negative  Cardiovascular: Negative  Gastrointestinal: Negative  Musculoskeletal: Negative  Skin: Negative  Psychiatric/Behavioral: The patient is nervous/anxious  Objective:  Physical Exam  Vitals and nursing note reviewed  Constitutional:       Appearance: She is well-developed  HENT:      Head: Normocephalic and atraumatic  Eyes:      General: No scleral icterus  Extraocular Movements: Extraocular movements intact  Conjunctiva/sclera: Conjunctivae normal    Neck:      Thyroid: No thyromegaly  Pulmonary:      Effort: Pulmonary effort is normal       Breath sounds: No wheezing or rales  Musculoskeletal:      Cervical back: Normal range of motion and neck supple  Skin:     Findings: No erythema or rash  Neurological:      Mental Status: She is alert              Past Surgical History:   Procedure Laterality Date    [place] LAP,RMV  ADNEXAL STRUCTURE Bilateral 9/1/2020    Procedure: LAPAROSCOPIC SALPINGECTOMY;  Surgeon: Mya Peters DO;  [facility];  Service: Gynecology    TUBAL LIGATION      WISDOM TOOTH EXTRACTION         Family History   Problem Relation Age of Onset    Hypertension Mother     Endometrial cancer Mother     Hyperlipidemia Mother     Stroke Mother     Hypertension Father     Autism Brother     Other Paternal Grandmother         lymes    No Known Problems Paternal Grandfather     No Known Problems Half-Brother     No Known Problems Half-Brother          Current Outpatient Medications:     escitalopram (Lexapro) 10 mg tablet, Take 1 tablet (10 mg total) by mouth in the morning , Disp: 90 tablet, Rfl: 3    ferrous sulfate 324 (65 Fe) mg, TAKE 1 TABLET (324 MG TOTAL) BY MOUTH 2 (TWO) TIMES A DAY BEFORE MEALS, Disp: 180 tablet, Rfl: 1    zolpidem (AMBIEN) 5 mg tablet, Take 1 tablet (5 mg total) by mouth daily at bedtime as needed for sleep, Disp: 30 tablet, Rfl: 0    LORazepam (ATIVAN) 0 5 mg tablet, Take 1 tablet (0 5 mg total) by mouth every 8 (eight) hours as needed for anxiety for up to 5 days (Patient not taking: Reported on 4/26/2022 ), Disp: 15 tablet, Rfl: 0    No Known Allergies    Vitals:    05/24/22 1735   BP: 112/76   BP Location: Right arm   Patient Position: Sitting   Cuff Size: Standard   Pulse: 72   Resp: 16   SpO2: 99%   Weight: 53 8 kg (118 lb 9 6 oz)   Height: 5' 1" (1 549 m)

## 2022-05-24 NOTE — ASSESSMENT & PLAN NOTE
History of panic attacks and now almost daily high anxiety, in the past Zoloft and BuSpar was tried which did not help her much, will try the Lexapro, and she will see the counselor she can still take lorazepam as needed

## 2022-06-07 ENCOUNTER — OFFICE VISIT (OUTPATIENT)
Dept: FAMILY MEDICINE CLINIC | Facility: CLINIC | Age: 32
End: 2022-06-07
Payer: COMMERCIAL

## 2022-06-07 VITALS
BODY MASS INDEX: 21.9 KG/M2 | HEIGHT: 61 IN | HEART RATE: 72 BPM | RESPIRATION RATE: 16 BRPM | WEIGHT: 116 LBS | SYSTOLIC BLOOD PRESSURE: 102 MMHG | OXYGEN SATURATION: 98 % | DIASTOLIC BLOOD PRESSURE: 70 MMHG

## 2022-06-07 DIAGNOSIS — Z13.6 SCREENING FOR CARDIOVASCULAR CONDITION: ICD-10-CM

## 2022-06-07 DIAGNOSIS — G47.00 INSOMNIA, UNSPECIFIED TYPE: ICD-10-CM

## 2022-06-07 DIAGNOSIS — R07.9 CHEST PAIN, UNSPECIFIED TYPE: Primary | ICD-10-CM

## 2022-06-07 DIAGNOSIS — F41.0 PANIC ATTACK: ICD-10-CM

## 2022-06-07 PROCEDURE — 1036F TOBACCO NON-USER: CPT | Performed by: FAMILY MEDICINE

## 2022-06-07 PROCEDURE — 3008F BODY MASS INDEX DOCD: CPT | Performed by: FAMILY MEDICINE

## 2022-06-07 PROCEDURE — 99214 OFFICE O/P EST MOD 30 MIN: CPT | Performed by: FAMILY MEDICINE

## 2022-06-07 NOTE — ASSESSMENT & PLAN NOTE
No panic attacks since she was started on Lexapro, she has been taking Lexapro for 10 days and she is improving, she will continue same medication and will have follow-up in 1-2 months

## 2022-06-07 NOTE — ASSESSMENT & PLAN NOTE
She sometimes feels some chest discomfort, discussed with her that in the ER she was evaluated her chest x-ray is normal her D-dimer was negative, could be acid reflux , she can take Pepcid as needed

## 2022-06-07 NOTE — PROGRESS NOTES
Assessment/Plan:    Problem List Items Addressed This Visit        Other    Insomnia     Improving with Lexapro           Panic attack     No panic attacks since she was started on Lexapro, she has been taking Lexapro for 10 days and she is improving, she will continue same medication and will have follow-up in 1-2 months           Chest pain - Primary     She sometimes feels some chest discomfort, discussed with her that in the ER she was evaluated her chest x-ray is normal her D-dimer was negative, could be acid reflux , she can take Pepcid as needed           RESOLVED: Screening for cardiovascular condition          No follow-ups on file  Chief Complaint   Patient presents with    Follow-up     New medication review       Subjective:   Patient ID: Samantha Bullard is a 28 y o  female  She is here follow-up as she started Lexapro about 10 days ago and she says it makes her feel much better compared to Zoloft her anxiety is lower than before, she has not use the Ativan, she has taken the Ambien 1 or 2 times only and she says she was sleeping better with Lexapro  She is also taking iron her labs are due for cholesterol next month  HPI    Review of Systems   Constitutional: Negative for activity change, appetite change, chills, fatigue, fever and unexpected weight change  HENT: Negative for congestion, ear discharge, ear pain, nosebleeds, postnasal drip, rhinorrhea, sinus pressure, sneezing, sore throat, trouble swallowing and voice change  Eyes: Negative for photophobia, pain, discharge, redness and itching  Respiratory: Negative for cough, chest tightness, shortness of breath and wheezing  Cardiovascular: Negative for chest pain, palpitations and leg swelling  Gastrointestinal: Negative for abdominal pain, constipation, diarrhea, nausea and vomiting  Acid reflux   Endocrine: Negative for polyuria  Genitourinary: Negative for dysuria, frequency and urgency     Musculoskeletal: Negative for arthralgias, back pain, myalgias and neck pain  Skin: Negative for color change, pallor and rash  Allergic/Immunologic: Negative for environmental allergies and food allergies  Neurological: Negative for dizziness, weakness, light-headedness and headaches  Hematological: Negative for adenopathy  Does not bruise/bleed easily  Psychiatric/Behavioral: Negative for behavioral problems  The patient is not nervous/anxious  Objective:  Physical Exam  Vitals reviewed  Constitutional:       Appearance: Normal appearance  She is well-developed  HENT:      Head: Normocephalic and atraumatic  Right Ear: External ear normal       Left Ear: External ear normal    Eyes:      General: No scleral icterus  Conjunctiva/sclera: Conjunctivae normal       Pupils: Pupils are equal, round, and reactive to light  Neck:      Thyroid: No thyromegaly  Cardiovascular:      Rate and Rhythm: Normal rate and regular rhythm  Heart sounds: Normal heart sounds  No murmur heard  Pulmonary:      Effort: Pulmonary effort is normal       Breath sounds: Normal breath sounds  No wheezing or rales  Musculoskeletal:      Cervical back: Normal range of motion and neck supple  Lymphadenopathy:      Cervical: No cervical adenopathy  Skin:     Coloration: Skin is not jaundiced  Findings: No erythema or rash  Neurological:      Mental Status: She is alert              Past Surgical History:   Procedure Laterality Date    [place] LAP,RMV  ADNEXAL STRUCTURE Bilateral 9/1/2020    Procedure: LAPAROSCOPIC SALPINGECTOMY;  Surgeon: Marlin Abdi DO;  [facility];  Service: Gynecology    TUBAL LIGATION      WISDOM TOOTH EXTRACTION         Family History   Problem Relation Age of Onset    Hypertension Mother     Endometrial cancer Mother     Hyperlipidemia Mother     Stroke Mother     Hypertension Father     Autism Brother     Other Paternal Grandmother         lymes    No Known Problems Paternal Grandfather     No Known Problems Half-Brother     No Known Problems Half-Brother          Current Outpatient Medications:     escitalopram (Lexapro) 10 mg tablet, Take 1 tablet (10 mg total) by mouth in the morning , Disp: 90 tablet, Rfl: 3    ferrous sulfate 324 (65 Fe) mg, TAKE 1 TABLET (324 MG TOTAL) BY MOUTH 2 (TWO) TIMES A DAY BEFORE MEALS, Disp: 180 tablet, Rfl: 1    zolpidem (AMBIEN) 5 mg tablet, Take 1 tablet (5 mg total) by mouth daily at bedtime as needed for sleep, Disp: 30 tablet, Rfl: 0    LORazepam (ATIVAN) 0 5 mg tablet, Take 1 tablet (0 5 mg total) by mouth every 8 (eight) hours as needed for anxiety for up to 5 days (Patient not taking: Reported on 4/26/2022 ), Disp: 15 tablet, Rfl: 0    No Known Allergies    Vitals:    06/07/22 1523   BP: 102/70   BP Location: Right arm   Patient Position: Sitting   Cuff Size: Standard   Pulse: 72   Resp: 16   SpO2: 98%   Weight: 52 6 kg (116 lb)   Height: 5' 1" (1 549 m)

## 2022-09-15 DIAGNOSIS — G47.00 INSOMNIA, UNSPECIFIED TYPE: ICD-10-CM

## 2022-09-15 RX ORDER — ZOLPIDEM TARTRATE 5 MG/1
5 TABLET ORAL
Qty: 30 TABLET | Refills: 0 | Status: SHIPPED | OUTPATIENT
Start: 2022-09-15

## 2023-05-10 DIAGNOSIS — G47.00 INSOMNIA, UNSPECIFIED TYPE: ICD-10-CM

## 2023-05-10 NOTE — TELEPHONE ENCOUNTER
INNA ROMAN Last refill 02/13/2023 # 30     Patient is out of medication she did schedule an appointment for next week

## 2023-05-11 RX ORDER — ZOLPIDEM TARTRATE 5 MG/1
5 TABLET ORAL
Qty: 30 TABLET | Refills: 3 | Status: SHIPPED | OUTPATIENT
Start: 2023-05-11

## 2023-07-11 ENCOUNTER — OFFICE VISIT (OUTPATIENT)
Dept: FAMILY MEDICINE CLINIC | Facility: CLINIC | Age: 33
End: 2023-07-11

## 2023-07-11 VITALS
DIASTOLIC BLOOD PRESSURE: 68 MMHG | HEIGHT: 61 IN | SYSTOLIC BLOOD PRESSURE: 110 MMHG | HEART RATE: 97 BPM | WEIGHT: 122 LBS | RESPIRATION RATE: 16 BRPM | OXYGEN SATURATION: 99 % | BODY MASS INDEX: 23.03 KG/M2

## 2023-07-11 DIAGNOSIS — F41.0 PANIC ATTACK: Primary | ICD-10-CM

## 2023-07-11 DIAGNOSIS — F41.9 ANXIETY: ICD-10-CM

## 2023-07-11 DIAGNOSIS — G47.00 INSOMNIA, UNSPECIFIED TYPE: ICD-10-CM

## 2023-07-11 PROBLEM — R07.9 CHEST PAIN: Status: RESOLVED | Noted: 2022-04-26 | Resolved: 2023-07-11

## 2023-07-11 PROCEDURE — 99212 OFFICE O/P EST SF 10 MIN: CPT | Performed by: FAMILY MEDICINE

## 2023-07-11 RX ORDER — ZOLPIDEM TARTRATE 5 MG/1
5 TABLET ORAL
Qty: 30 TABLET | Refills: 3 | Status: SHIPPED | OUTPATIENT
Start: 2023-07-11

## 2023-07-11 RX ORDER — ESCITALOPRAM OXALATE 10 MG/1
10 TABLET ORAL DAILY
Qty: 30 TABLET | Refills: 3 | Status: SHIPPED | OUTPATIENT
Start: 2023-07-11

## 2023-07-11 NOTE — PROGRESS NOTES
Name: Davina Maradiaga      : 1990      MRN: [id number]  Encounter Provider: Michael Ferrell MD  Encounter Date: 2023   Encounter department: [address] [place] [address]     1. Panic attack  Assessment & Plan:  She is stable on Lexapro      2. Insomnia, unspecified type  Assessment & Plan:   website is checked and refill is given, she use as needed Ambien    Orders:  -     zolpidem (AMBIEN) 5 mg tablet; Take 1 tablet (5 mg total) by mouth daily at bedtime as needed for sleep    3. Anxiety  -     escitalopram (Lexapro) 10 mg tablet; Take 1 tablet (10 mg total) by mouth daily           Subjective     She takes Ambien intermittently and needs a refill, she says she lost her job she is looking for new job and she does not have insurance, she is taking Lexapro for anxiety and panic which is stable she also take iron over-the-counter for anemia    Review of Systems   Constitutional: Negative for activity change, appetite change, chills, fatigue, fever and unexpected weight change. HENT: Negative for congestion, ear discharge, ear pain, nosebleeds, postnasal drip, rhinorrhea, sinus pressure, sneezing, sore throat, trouble swallowing and voice change. Eyes: Negative for photophobia, pain, discharge, redness and itching. Respiratory: Negative for cough, chest tightness, shortness of breath and wheezing. Cardiovascular: Negative for chest pain, palpitations and leg swelling. Gastrointestinal: Negative for abdominal pain, constipation, diarrhea, nausea and vomiting. Endocrine: Negative for polyuria. Genitourinary: Negative for dysuria, frequency and urgency. Musculoskeletal: Negative for arthralgias, back pain, myalgias and neck pain. Skin: Negative for color change, pallor and rash. Allergic/Immunologic: Negative for environmental allergies and food allergies. Neurological: Negative for dizziness, weakness, light-headedness and headaches.    Hematological: Negative for adenopathy. Does not bruise/bleed easily. Psychiatric/Behavioral: Negative for behavioral problems and sleep disturbance. The patient is not nervous/anxious.     She will come back for physical once she gets her insurance    Past Medical History:   Diagnosis Date   • Iron deficiency      Past Surgical History:   Procedure Laterality Date   • [place] LAPAROSCOPY W/RMVL ADNEXAL STRUCTURES Bilateral 9/1/2020    Procedure: LAPAROSCOPIC SALPINGECTOMY;  Surgeon: Aston Graham DO;  [facility];  Service: Gynecology   • TUBAL LIGATION     • WISDOM TOOTH EXTRACTION       Family History   Problem Relation Age of Onset   • Hypertension Mother    • Endometrial cancer Mother    • Hyperlipidemia Mother    • Stroke Mother    • Hypertension Father    • Autism Brother    • Other Paternal Grandmother         lymes   • No Known Problems Paternal Grandfather    • No Known Problems Half-Brother    • No Known Problems Half-Brother      Social History     Socioeconomic History   • Marital status: Single     Spouse name: None   • Number of children: None   • Years of education: 12   • Highest education level: None   Occupational History   • Occupation: [employer]   Tobacco Use   • Smoking status: Never   • Smokeless tobacco: Never   Vaping Use   • Vaping Use: Never used   Substance and Sexual Activity   • Alcohol use: Yes     Comment: occasionally    • Drug use: Never   • Sexual activity: Not Currently     Birth control/protection: Female Sterilization   Other Topics Concern   • None   Social History Narrative    Most recent tobacco use screening: 10-    Do you currently or have you served in the [address] [place] [address] [employer]: No    Were you activated, into active duty, as a member of the [employer] or as a Reservist: No    Occupation: "[employer] [address] [place] [address]    Education: 12    student college    Marital status: Single    Sexual orientation: Heterosexual    Exercise level: Occasional    Diet: Regular    General stress level: Medium Alcohol intake: Occasional    Caffeine intake: Occasional    Chewing tobacco: none    Illicit drugs: none    Guns present in home: No    Seat belts used routinely: Yes    Sunscreen used routinely: No    Smoke alarm in home: Yes    Advance directive: No    Salt Intake: moderate    Has the Patient had a mammogram to screen for breast cancer within 24 months: No    Would the patient like to s     Social Determinants of Health     Financial Resource Strain: Not on file   Food Insecurity: Not on file   Transportation Needs: Not on file   Physical Activity: Not on file   Stress: Not on file   Social Connections: Not on file   Intimate Partner Violence: Not on file   Housing Stability: Not on file     Current Outpatient Medications on File Prior to Visit   Medication Sig   • ferrous sulfate 324 (65 Fe) mg TAKE 1 TABLET (324 MG TOTAL) BY MOUTH 2 (TWO) TIMES A DAY BEFORE MEALS   • [DISCONTINUED] escitalopram (Lexapro) 10 mg tablet Take 1 tablet (10 mg total) by mouth in the morning.    • [DISCONTINUED] zolpidem (AMBIEN) 5 mg tablet Take 1 tablet (5 mg total) by mouth daily at bedtime as needed for sleep   • LORazepam (ATIVAN) 0.5 mg tablet Take 1 tablet (0.5 mg total) by mouth every 8 (eight) hours as needed for anxiety for up to 5 days (Patient not taking: Reported on 4/26/2022 )     No Known Allergies  Immunization History   Administered Date(s) Administered   • COVID-19 PFIZER VACCINE 0.3 ML IM 05/01/2021, 05/22/2021, 01/14/2022   • COVID-19 Pfizer Vac BIVALENT Palmer-sucrose 12 Yr+ IM (BOOSTER ONLY) 11/18/2022   • DTaP 5 1990, 03/02/1992, 07/16/1992, 08/04/1993   • HPV9 07/16/2021, 09/23/2021   • Hep B, adult 10/28/1999, 11/30/1999, 03/02/2000   • IPV 1990, 03/12/1992, 08/04/1993   • Influenza, injectable, quadrivalent, preservative free 0.5 mL 01/06/2021, 12/09/2021   • Influenza, recombinant, quadrivalent,injectable, preservative free 11/18/2022   • MMR 08/04/1993, 10/28/1999   • Meningococcal, Unknown Serogroups 08/12/2009   • Td (adult), adsorbed 04/28/2003   • Varicella 04/28/2003, 08/12/2009       Objective     /68   Pulse 97   Resp 16   Ht 5' 1" (1.549 m)   Wt 55.3 kg (122 lb)   SpO2 99%   BMI 23.05 kg/m²     Physical Exam  Vitals and nursing note reviewed. Constitutional:       Appearance: Normal appearance. She is well-developed. She is not ill-appearing. Eyes:      Extraocular Movements: Extraocular movements intact. Neck:      Thyroid: No thyromegaly. Cardiovascular:      Rate and Rhythm: Normal rate and regular rhythm. Heart sounds: Normal heart sounds. No murmur heard. Pulmonary:      Effort: Pulmonary effort is normal.      Breath sounds: Normal breath sounds. No wheezing or rales. Musculoskeletal:      Cervical back: Normal range of motion and neck supple. Skin:     Findings: No erythema or rash. Neurological:      General: No focal deficit present. Mental Status: She is alert.    Psychiatric:         Mood and Affect: Mood normal.       Janet Harmon MD

## 2023-08-02 DIAGNOSIS — F41.9 ANXIETY: ICD-10-CM

## 2023-08-02 RX ORDER — ESCITALOPRAM OXALATE 10 MG/1
10 TABLET ORAL DAILY
Qty: 90 TABLET | Refills: 2 | OUTPATIENT
Start: 2023-08-02

## 2023-09-07 DIAGNOSIS — G47.00 INSOMNIA, UNSPECIFIED TYPE: ICD-10-CM

## 2023-09-07 RX ORDER — ZOLPIDEM TARTRATE 5 MG/1
5 TABLET ORAL
Qty: 30 TABLET | Refills: 0 | Status: SHIPPED | OUTPATIENT
Start: 2023-09-07

## 2023-10-24 ENCOUNTER — OFFICE VISIT (OUTPATIENT)
Dept: FAMILY MEDICINE CLINIC | Facility: CLINIC | Age: 33
End: 2023-10-24
Payer: COMMERCIAL

## 2023-10-24 VITALS
RESPIRATION RATE: 16 BRPM | DIASTOLIC BLOOD PRESSURE: 68 MMHG | OXYGEN SATURATION: 99 % | HEART RATE: 90 BPM | SYSTOLIC BLOOD PRESSURE: 108 MMHG | HEIGHT: 61 IN | BODY MASS INDEX: 24.92 KG/M2 | WEIGHT: 132 LBS

## 2023-10-24 DIAGNOSIS — F41.0 PANIC ATTACK: ICD-10-CM

## 2023-10-24 DIAGNOSIS — E78.2 MIXED HYPERLIPIDEMIA: ICD-10-CM

## 2023-10-24 DIAGNOSIS — D50.9 IRON DEFICIENCY ANEMIA, UNSPECIFIED IRON DEFICIENCY ANEMIA TYPE: ICD-10-CM

## 2023-10-24 DIAGNOSIS — Z23 NEEDS FLU SHOT: ICD-10-CM

## 2023-10-24 DIAGNOSIS — G47.00 INSOMNIA, UNSPECIFIED TYPE: Primary | ICD-10-CM

## 2023-10-24 DIAGNOSIS — Z13.6 SCREENING FOR CARDIOVASCULAR CONDITION: ICD-10-CM

## 2023-10-24 PROCEDURE — 90471 IMMUNIZATION ADMIN: CPT | Performed by: FAMILY MEDICINE

## 2023-10-24 PROCEDURE — 99214 OFFICE O/P EST MOD 30 MIN: CPT | Performed by: FAMILY MEDICINE

## 2023-10-24 PROCEDURE — 90686 IIV4 VACC NO PRSV 0.5 ML IM: CPT | Performed by: FAMILY MEDICINE

## 2023-10-24 NOTE — PROGRESS NOTES
Name: Kiki Haddad      : 1990      MRN: [id number]  Encounter Provider: Israel Kaufman MD  Encounter Date: 10/24/2023   Encounter department: [address] [place] [address]     1. Insomnia, unspecified type  -     Comprehensive metabolic panel; Future; Expected date: 10/24/2023  -     Hemoglobin A1C; Future; Expected date: 10/24/2023    2. Iron deficiency anemia, unspecified iron deficiency anemia type  Assessment & Plan:  she is taking iron every day, will check her labs ferritin level and CBC and follow-up in 2 weeks    Orders:  -     CBC and differential; Future; Expected date: 10/24/2023  -     Ferritin; Future    3. Mixed hyperlipidemia  Assessment & Plan:  Lab Results   Component Value Date    LDLCALC 170 (H) 2022   Cholesterol is very high last year, advised to recheck her labs this year, she has gained 10 pounds recently, needs follow-up in 2 weeks      Orders:  -     CBC and differential; Future; Expected date: 10/24/2023  -     Lipid panel; Future; Expected date: 10/24/2023  -     TSH, 3rd generation; Future; Expected date: 10/24/2023    4. Screening for cardiovascular condition    5. Needs flu shot  -     influenza vaccine, quadrivalent, 0.5 mL, preservative-free, for adult and pediatric patients 6 mos+ (AFLURIA, FLUARIX, FLULAVAL, FLUZONE)    6.  Panic attack  Assessment & Plan:  No panic attack or no anxiety attack in last few months, she is doing well without Lexapro      F/u 2 wk        Subjective     Follow-up on insomnia, she says Ambien is working well and she sleeps well sometimes she has to take melatonin,  She says she was forgetting to take Lexapro intermittently and then she realized after few days of missing medicine she did not even needed, she stopped taking it now for few weeks and she is doing well no panic attack no anxiety, she has history of iron deficiency anemia and hyperlipidemia she has gained 10 pounds since summer,, and the labs are due, she also wants flu vaccine      Review of Systems   Constitutional:  Negative for activity change, appetite change, chills, fatigue, fever and unexpected weight change. HENT:  Negative for congestion, ear discharge, ear pain, nosebleeds, postnasal drip, rhinorrhea, sinus pressure, sneezing, sore throat, trouble swallowing and voice change. Eyes:  Negative for photophobia, pain, discharge, redness and itching. Respiratory:  Negative for cough, chest tightness, shortness of breath and wheezing. Cardiovascular:  Negative for chest pain, palpitations and leg swelling. Gastrointestinal:  Negative for abdominal pain, constipation, diarrhea, nausea and vomiting. Endocrine: Negative for polyuria. Genitourinary:  Negative for dysuria, frequency and urgency. Musculoskeletal:  Negative for arthralgias, back pain, myalgias and neck pain. Skin:  Negative for color change, pallor and rash. Allergic/Immunologic: Negative for environmental allergies and food allergies. Neurological:  Negative for dizziness, weakness, light-headedness and headaches. Hematological:  Negative for adenopathy. Does not bruise/bleed easily. Psychiatric/Behavioral:  Negative for behavioral problems. The patient is not nervous/anxious.         Past Medical History:   Diagnosis Date   • Iron deficiency      Past Surgical History:   Procedure Laterality Date   • SC LAPAROSCOPY W/RMVL ADNEXAL STRUCTURES Bilateral 9/1/2020    Procedure: LAPAROSCOPIC SALPINGECTOMY;  Surgeon: Claudia Kennedy DO;  Location: [facility];  Service: Gynecology   • TUBAL LIGATION     • WISDOM TOOTH EXTRACTION       Family History   Problem Relation Age of Onset   • Hypertension Mother    • Endometrial cancer Mother    • Hyperlipidemia Mother    • Stroke Mother    • Hypertension Father    • Autism Brother    • Other Paternal Grandmother         lymes   • No Known Problems Paternal Grandfather    • No Known Problems Half-Brother    • No Known Problems Half-Brother      Social History     Socioeconomic History   • Marital status: Single     Spouse name: None   • Number of children: None   • Years of education: 15   • Highest education level: None   Occupational History   • Occupation: Payoff   Tobacco Use   • Smoking status: Never   • Smokeless tobacco: Never   Vaping Use   • Vaping Use: Never used   Substance and Sexual Activity   • Alcohol use: Yes     Comment: occasionally    • Drug use: Never   • Sexual activity: Not Currently     Birth control/protection: Female Sterilization   Other Topics Concern   • None   Social History Narrative    Most recent tobacco use screening: 10-    Do you currently or have you served in the [address] [place] [address]: No    Were you activated, into active duty, as a member of the [employer] or as a Reservist: No    Occupation: [employer] [address]    Education: 15    student college    Marital status: Single    Sexual orientation: Heterosexual    Exercise level: Occasional    Diet: Regular    General stress level: Medium    Alcohol intake: Occasional    Caffeine intake: Occasional    Chewing tobacco: none    Illicit drugs: none    Guns present in home: No    Seat belts used routinely: Yes    Sunscreen used routinely: No    Smoke alarm in home: Yes    Advance directive: No    Salt Intake: moderate    Has the Patient had a mammogram to screen for breast cancer within 24 months: No    Would the patient like to s     Social Determinants of Health     Financial Resource Strain: Not on file   Food Insecurity: Not on file   Transportation Needs: Not on file   Physical Activity: Not on file   Stress: Not on file   Social Connections: Not on file   Intimate Partner Violence: Not on file   Housing Stability: Not on file     Current Outpatient Medications on File Prior to Visit   Medication Sig   • ferrous sulfate 324 (65 Fe) mg TAKE 1 TABLET (324 MG TOTAL) BY MOUTH 2 (TWO) TIMES A DAY BEFORE MEALS   • LORazepam (ATIVAN) 0.5 mg tablet Take 1 tablet (0.5 mg total) by mouth every 8 (eight) hours as needed for anxiety for up to 5 days (Patient not taking: Reported on 4/26/2022 )   • zolpidem (AMBIEN) 5 mg tablet Take 1 tablet (5 mg total) by mouth daily at bedtime as needed for sleep   • [DISCONTINUED] escitalopram (Lexapro) 10 mg tablet Take 1 tablet (10 mg total) by mouth daily     No Known Allergies  Immunization History   Administered Date(s) Administered   • COVID-19 PFIZER VACCINE 0.3 ML IM 05/01/2021, 05/22/2021, 01/14/2022   • COVID-19 Pfizer Vac BIVALENT Palmer-sucrose 12 Yr+ IM 11/18/2022   • DTaP 5 1991, 03/02/1992, 07/16/1992, 08/04/1993   • HPV9 07/16/2021, 09/23/2021   • Hep B, adult 10/28/1999, 11/30/1999, 03/02/2000   • IPV 1990, 03/12/1992, 08/04/1993   • Influenza, injectable, quadrivalent, preservative free 0.5 mL 01/06/2021, 12/09/2021, 10/24/2023   • Influenza, recombinant, quadrivalent,injectable, preservative free 11/18/2022   • MMR 08/04/1993, 10/28/1999   • Meningococcal, Unknown Serogroups 08/12/2009   • Td (adult), adsorbed 04/28/2003   • Varicella 04/28/2003, 08/12/2009       Objective     /68   Pulse 90   Resp 16   Ht 5' 1" (1.549 m)   Wt 59.9 kg (132 lb)   SpO2 99%   BMI 24.94 kg/m²     Physical Exam  Vitals and nursing note reviewed. Constitutional:       Appearance: Normal appearance. She is well-developed. She is not ill-appearing. Eyes:      Extraocular Movements: Extraocular movements intact. Neck:      Thyroid: No thyromegaly. Cardiovascular:      Rate and Rhythm: Normal rate and regular rhythm. Heart sounds: Normal heart sounds. No murmur heard. Pulmonary:      Effort: Pulmonary effort is normal.      Breath sounds: Normal breath sounds. No wheezing or rales. Musculoskeletal:      Cervical back: Normal range of motion and neck supple. Right lower leg: No edema. Left lower leg: No edema. Skin:     Findings: No erythema or rash.    Neurological:      General: No focal deficit present. Mental Status: She is alert.    Psychiatric:         Mood and Affect: Mood normal.       Regina Velasquez MD

## 2023-10-24 NOTE — ASSESSMENT & PLAN NOTE
Lab Results   Component Value Date    LDLCALC 170 (H) 01/19/2022   Cholesterol is very high last year, advised to recheck her labs this year, she has gained 10 pounds recently, needs follow-up in 2 weeks

## 2023-11-07 DIAGNOSIS — G47.00 INSOMNIA, UNSPECIFIED TYPE: ICD-10-CM

## 2023-11-08 RX ORDER — ZOLPIDEM TARTRATE 5 MG/1
5 TABLET ORAL
Qty: 30 TABLET | Refills: 0 | Status: SHIPPED | OUTPATIENT
Start: 2023-11-08

## 2023-12-23 PROBLEM — Z13.6 SCREENING FOR CARDIOVASCULAR CONDITION: Status: RESOLVED | Noted: 2020-03-19 | Resolved: 2023-12-23

## 2024-02-12 DIAGNOSIS — G47.00 INSOMNIA, UNSPECIFIED TYPE: ICD-10-CM

## 2024-02-12 RX ORDER — ZOLPIDEM TARTRATE 5 MG/1
5 TABLET ORAL
Qty: 30 TABLET | Refills: 0 | Status: SHIPPED | OUTPATIENT
Start: 2024-02-12

## 2024-04-11 DIAGNOSIS — G47.00 INSOMNIA, UNSPECIFIED TYPE: ICD-10-CM

## 2024-04-11 RX ORDER — ZOLPIDEM TARTRATE 5 MG/1
5 TABLET ORAL
Qty: 30 TABLET | Refills: 0 | Status: SHIPPED | OUTPATIENT
Start: 2024-04-11

## 2024-04-29 ENCOUNTER — OFFICE VISIT (OUTPATIENT)
Dept: FAMILY MEDICINE CLINIC | Facility: CLINIC | Age: 34
End: 2024-04-29

## 2024-04-29 VITALS
HEART RATE: 78 BPM | HEIGHT: 61 IN | RESPIRATION RATE: 16 BRPM | DIASTOLIC BLOOD PRESSURE: 62 MMHG | OXYGEN SATURATION: 99 % | BODY MASS INDEX: 23.6 KG/M2 | WEIGHT: 125 LBS | SYSTOLIC BLOOD PRESSURE: 110 MMHG

## 2024-04-29 DIAGNOSIS — G47.00 INSOMNIA, UNSPECIFIED TYPE: Primary | ICD-10-CM

## 2024-04-29 DIAGNOSIS — F98.8 ATTENTION DEFICIT DISORDER (ADD) WITHOUT HYPERACTIVITY: ICD-10-CM

## 2024-04-29 DIAGNOSIS — D50.9 IRON DEFICIENCY ANEMIA, UNSPECIFIED IRON DEFICIENCY ANEMIA TYPE: ICD-10-CM

## 2024-04-29 PROBLEM — Z23 NEEDS FLU SHOT: Status: RESOLVED | Noted: 2021-01-06 | Resolved: 2024-04-29

## 2024-04-29 PROCEDURE — 99212 OFFICE O/P EST SF 10 MIN: CPT | Performed by: FAMILY MEDICINE

## 2024-04-29 NOTE — ASSESSMENT & PLAN NOTE
Pt reports taking iron pills.  She will get her labs drawn, including ferriting and CBC to monitor.

## 2024-04-29 NOTE — ASSESSMENT & PLAN NOTE
Pt reports struggling with insomnia over 1 year, only getting a few hours of sleep every night because of Difficulty falling asleep.  It is effecting her quality of life and interfering with her work performance.  She has tried melatonin, Ambien, and Ativan without improvement.  She is wondering if her insomnia is due to an undiagnosed ADHD diagnosis and would like to be evaluated for ADHD.

## 2024-04-29 NOTE — ASSESSMENT & PLAN NOTE
Pt concerned she may have ADHD/ADD that is causing her insomnia. She reports Difficulty concentrating on certain tasks, hyperfixation on certain things, and easily becoming distracted.  No prior dx of ADHD/ADD.  Referral placed to Psych services for evaluation.

## 2024-04-29 NOTE — PROGRESS NOTES
Name: Samantha Jaramillo      : 1990      MRN: [id number]  Encounter Provider: Pau Jordan MD  Encounter Date: 2024   Encounter department: [facility]    Assessment & Plan     1. Insomnia, unspecified type  Assessment & Plan:  Pt reports struggling with insomnia over 1 year, only getting a few hours of sleep every night because of Difficulty falling asleep.  It is effecting her quality of life and interfering with her work performance.  She has tried melatonin, Ambien, and Ativan without improvement.  She is wondering if her insomnia is due to an undiagnosed ADHD diagnosis and would like to be evaluated for ADHD.      Orders:  -     Ambulatory referral to Psych Services; Future    2. Attention deficit disorder (ADD) without hyperactivity  Assessment & Plan:  Pt concerned she may have ADHD/ADD that is causing her insomnia. She reports Difficulty concentrating on certain tasks, hyperfixation on certain things, and easily becoming distracted.  No prior dx of ADHD/ADD.  Referral placed to Psych services for evaluation.    Orders:  -     Ambulatory referral to Psych Services; Future    3. Iron deficiency anemia, unspecified iron deficiency anemia type  Assessment & Plan:  Pt reports taking iron pills.  She will get her labs drawn, including ferriting and CBC to monitor.               Subjective     Pt following up for continued insomnia.  She reports that she feels that she hasn't had normal amounts of sleep in over 1 year and has been trying different medications since then.  Currently she is taking Ambien, however, she reports that it only was helpful when she started it, but she feels that she has developed a tolerance to it, so wants to stop it.  She has tried OTC medications, such as melatonin, as well as Ativan and they have only briefly improved her sleep.  She lost her job due to missing several days due to being overly tired because of only sleeping a few hours a night.  She  reports that almost every night she only sleeps 3-4 hours and has not had a full night of sleep in over 1 year.    Pt is wondering if her insomnia may be due to undiagnosed ADHD.  She feeels that she relates to the symptoms of ADHD, such as Difficulty conccentrating on certain tasks and losing interest in things quickly. She is interested in being evaluated for ADHD.    She denies any recent anxiety or panic attacks.  She has a history of iron deficieincy anemia and takes iron pills.  She  has not been able to get her labs drawn recently to reevaluate levels due to a busy schedule.      Review of Systems   Constitutional:  Positive for fatigue (secondary to insomnia). Negative for appetite change, chills, fever and unexpected weight change.   HENT:  Negative for congestion, ear discharge, ear pain, nosebleeds, postnasal drip, rhinorrhea, sinus pressure, sneezing, sore throat, trouble swallowing and voice change.    Eyes:  Negative for photophobia, pain, discharge, redness and itching.   Respiratory:  Negative for cough, chest tightness, shortness of breath and wheezing.    Cardiovascular:  Negative for chest pain, palpitations and leg swelling.   Gastrointestinal:  Negative for abdominal pain, constipation, diarrhea, nausea and vomiting.   Endocrine: Negative for polyuria.   Genitourinary:  Negative for dysuria, frequency and urgency.   Musculoskeletal:  Negative for arthralgias, back pain, myalgias and neck pain.   Skin:  Negative for color change, pallor and rash.   Allergic/Immunologic: Negative for environmental allergies.   Neurological:  Negative for dizziness, weakness, light-headedness and headaches.   Hematological:  Negative for adenopathy. Does not bruise/bleed easily.   Psychiatric/Behavioral:  Positive for decreased concentration and sleep disturbance (insomnia). Negative for agitation, behavioral problems and suicidal ideas. The patient is not nervous/anxious and is not hyperactive.        Past Medical  History:   Diagnosis Date   • Iron deficiency      Past Surgical History:   Procedure Laterality Date   • [place] LAPAROSCOPY W/RMVL ADNEXAL STRUCTURES Bilateral 9/1/2020    Procedure: LAPAROSCOPIC SALPINGECTOMY;  Surgeon: Jodi Moon DO;  [facility];  Service: Gynecology   • TUBAL LIGATION     • WISDOM TOOTH EXTRACTION       Family History   Problem Relation Age of Onset   • Hypertension Mother    • Endometrial cancer Mother    • Hyperlipidemia Mother    • Stroke Mother    • Hypertension Father    • Autism Brother    • Other Paternal Grandmother         lymes   • No Known Problems Paternal Grandfather    • No Known Problems Half-Brother    • No Known Problems Half-Brother      Social History     Socioeconomic History   • Marital status: Single     Spouse name: None   • Number of children: None   • Years of education: 12   • Highest education level: None   Occupational History   • Occupation: [employer]   Tobacco Use   • Smoking status: Never   • Smokeless tobacco: Never   Vaping Use   • Vaping status: Never Used   Substance and Sexual Activity   • Alcohol use: Yes     Comment: occasionally    • Drug use: Never   • Sexual activity: Not Currently     Birth control/protection: Female Sterilization   Other Topics Concern   • None   Social History Narrative    Most recent tobacco use screening: 10-    Do you currently or have you served in the [employer]: No    Were you activated, into active duty, as a member of the [employer] or as a Reservist: No    Occupation: [employer]    Education: 12    student college    Marital status: Single    Sexual orientation: Heterosexual    Exercise level: Occasional    Diet: Regular    General stress level: Medium    Alcohol intake: Occasional    Caffeine intake: Occasional    Chewing tobacco: none    Illicit drugs: none    Guns present in home: No    Seat belts used routinely: Yes    Sunscreen used routinely: No    Smoke alarm in home: Yes    Advance  "directive: No    Salt Intake: moderate    Has the Patient had a mammogram to screen for breast cancer within 24 months: No    Would the patient like to s     Social Determinants of Health     Financial Resource Strain: Not on file   Food Insecurity: Not on file   Transportation Needs: Not on file   Physical Activity: Not on file   Stress: Not on file   Social Connections: Not on file   Intimate Partner Violence: Not on file   Housing Stability: Not on file     Current Outpatient Medications on File Prior to Visit   Medication Sig   • ferrous sulfate 324 (65 Fe) mg TAKE 1 TABLET (324 MG TOTAL) BY MOUTH 2 (TWO) TIMES A DAY BEFORE MEALS   • LORazepam (ATIVAN) 0.5 mg tablet Take 1 tablet (0.5 mg total) by mouth every 8 (eight) hours as needed for anxiety for up to 5 days (Patient not taking: Reported on 4/26/2022 )   • zolpidem (AMBIEN) 5 mg tablet Take 1 tablet (5 mg total) by mouth daily at bedtime as needed for sleep     No Known Allergies  Immunization History   Administered Date(s) Administered   • COVID-19 PFIZER VACCINE 0.3 ML IM 05/01/2021, 05/22/2021, 01/14/2022   • COVID-19 Pfizer Vac BIVALENT Palmer-sucrose 12 Yr+ IM 11/18/2022   • DTaP 5 1991, 03/02/1992, 07/16/1992, 08/04/1993   • HPV9 07/16/2021, 09/23/2021   • Hep B, adult 10/28/1999, 11/30/1999, 03/02/2000   • INFLUENZA 11/18/2022   • IPV 1991, 03/12/1992, 08/04/1993   • Influenza, injectable, quadrivalent, preservative free 0.5 mL 01/06/2021, 12/09/2021, 10/24/2023   • Influenza, recombinant, quadrivalent,injectable, preservative free 11/18/2022   • MMR 08/04/1993, 10/28/1999   • Meningococcal, Unknown Serogroups 08/12/2009   • Td (adult), adsorbed 04/28/2003   • Varicella 04/28/2003, 08/12/2009       Objective     /62   Pulse 78   Resp 16   Ht 5' 1\" (1.549 m)   Wt 56.7 kg (125 lb)   SpO2 99%   BMI 23.62 kg/m²     Physical Exam  Vitals and nursing note reviewed.   Constitutional:       Appearance: Normal appearance. She is " well-developed.   HENT:      Head: Normocephalic and atraumatic.      Nose: Nose normal.      Mouth/Throat:      Pharynx: No oropharyngeal exudate.   Eyes:      General: No scleral icterus.        Right eye: No discharge.         Left eye: No discharge.      Extraocular Movements: Extraocular movements intact.      Conjunctiva/sclera: Conjunctivae normal.   Neck:      Thyroid: No thyromegaly.      Trachea: No tracheal deviation.   Cardiovascular:      Rate and Rhythm: Normal rate and regular rhythm.      Heart sounds: Normal heart sounds. No murmur heard.  Pulmonary:      Effort: Pulmonary effort is normal. No respiratory distress.      Breath sounds: Normal breath sounds. No wheezing or rales.   Abdominal:      General: Bowel sounds are normal. There is no distension.      Palpations: Abdomen is soft. There is no mass.      Tenderness: There is no abdominal tenderness. There is no rebound.   Musculoskeletal:         General: Normal range of motion.      Cervical back: Normal range of motion and neck supple.   Lymphadenopathy:      Cervical: No cervical adenopathy.   Skin:     General: Skin is warm.      Coloration: Skin is not pale.      Findings: No erythema or rash.   Neurological:      Mental Status: She is alert and oriented to person, place, and time.      Cranial Nerves: No cranial nerve deficit.      Deep Tendon Reflexes: Reflexes are normal and symmetric.   Psychiatric:         Behavior: Behavior normal.         Thought Content: Thought content normal.         Judgment: Judgment normal.       Pau Jordan MD

## 2024-05-14 ENCOUNTER — TELEPHONE (OUTPATIENT)
Dept: FAMILY MEDICINE CLINIC | Facility: CLINIC | Age: 34
End: 2024-05-14

## 2024-05-14 NOTE — TELEPHONE ENCOUNTER
LAKEISHA for patient. She is scheduled for an appt for an ongoing issue for insomnia that she has been seen for in recent weeks by her current PCP at [facility] in [facility].  If patient calls back please transfer her to the office to discuss further.

## 2024-05-14 NOTE — TELEPHONE ENCOUNTER
"----- Message from SVETLANA Fine sent at 5/14/2024  3:00 PM EDT -----  Regarding: appt  Please call pt  She scheduled self on 5/16 for \"insmonia\"  Has PCP at [facility], and actually her PCP saw her for this issue 2 weeks ago.   Follow up should be with her PCP.     "

## 2024-05-15 ENCOUNTER — TELEPHONE (OUTPATIENT)
Dept: PSYCHIATRY | Facility: CLINIC | Age: 34
End: 2024-05-15

## 2024-05-15 NOTE — TELEPHONE ENCOUNTER
IC left voice message for pt about referral received for services and possible placement on the wait list.  2nd outreach attempt to pt.

## 2024-07-19 ENCOUNTER — OFFICE VISIT (OUTPATIENT)
Dept: FAMILY MEDICINE CLINIC | Facility: CLINIC | Age: 34
End: 2024-07-19

## 2024-07-19 VITALS
RESPIRATION RATE: 16 BRPM | SYSTOLIC BLOOD PRESSURE: 118 MMHG | OXYGEN SATURATION: 99 % | BODY MASS INDEX: 23.98 KG/M2 | WEIGHT: 127 LBS | HEIGHT: 61 IN | HEART RATE: 71 BPM | DIASTOLIC BLOOD PRESSURE: 82 MMHG

## 2024-07-19 DIAGNOSIS — Z00.00 WELL ADULT EXAM: ICD-10-CM

## 2024-07-19 DIAGNOSIS — D50.9 IRON DEFICIENCY ANEMIA, UNSPECIFIED IRON DEFICIENCY ANEMIA TYPE: ICD-10-CM

## 2024-07-19 DIAGNOSIS — G47.00 INSOMNIA, UNSPECIFIED TYPE: ICD-10-CM

## 2024-07-19 DIAGNOSIS — F98.8 ATTENTION DEFICIT DISORDER (ADD) WITHOUT HYPERACTIVITY: ICD-10-CM

## 2024-07-19 DIAGNOSIS — E78.2 MIXED HYPERLIPIDEMIA: ICD-10-CM

## 2024-07-19 DIAGNOSIS — Z79.899 OTHER LONG TERM (CURRENT) DRUG THERAPY: ICD-10-CM

## 2024-07-19 DIAGNOSIS — E55.9 VITAMIN D DEFICIENCY: Primary | ICD-10-CM

## 2024-07-19 DIAGNOSIS — F41.0 PANIC ATTACK: ICD-10-CM

## 2024-07-19 DIAGNOSIS — E53.8 B12 DEFICIENCY: ICD-10-CM

## 2024-07-19 DIAGNOSIS — E61.1 IRON DEFICIENCY: ICD-10-CM

## 2024-07-19 DIAGNOSIS — F41.9 ANXIETY: ICD-10-CM

## 2024-07-19 RX ORDER — QUETIAPINE FUMARATE 25 MG/1
TABLET, FILM COATED ORAL
Qty: 45 TABLET | Refills: 0 | Status: SHIPPED | OUTPATIENT
Start: 2024-07-19

## 2024-07-19 RX ORDER — ATOMOXETINE 18 MG/1
18 CAPSULE ORAL EVERY MORNING
COMMUNITY
Start: 2024-05-14 | End: 2024-07-19

## 2024-07-19 NOTE — PROGRESS NOTES
Ambulatory Visit  Name: Samantha Jaramillo      : 1990      MRN: [id number]  Encounter Provider: Jonathan Joseph MD  Encounter Date: 2024   Encounter department: [facility]    Assessment & Plan  1. Refractory insomnia.  Despite trying Ambien, Ativan, Zoloft, and BuSpar, her insomnia persists. A referral to a sleep specialist was made. Seroquel 25 mg was prescribed, with 45 pills provided. She was advised to start with 1 pill initially, and if ineffective, increase to 2 pills for a few nights, then 3 or 4 pills for a few nights. Blood tests were ordered to investigate underlying causes, including thyroid and electrolytes. Should the sleep medication prove ineffective, consideration will be given to starting birth control and consulting with her OB/GYN regarding hormonal regulation.      Assessment & Plan  Vitamin D deficiency    Orders:    Vitamin D 25 hydroxy    B12 deficiency    Orders:    Vitamin B12    Mixed hyperlipidemia    Orders:    Lipid Panel with Direct LDL reflex    Iron deficiency    Orders:    Iron Panel (Includes Ferritin, Iron Sat%, Iron, and TIBC)    Other long term (current) drug therapy    Orders:    Hemoglobin A1C    Attention deficit disorder (ADD) without hyperactivity    Orders:    Vitamin D 25 hydroxy    Vitamin B12    TSH, 3rd generation with Free T4 reflex    UA w Reflex to Microscopic w Reflex to Culture; Future    Magnesium    Lipid Panel with Direct LDL reflex    Iron Panel (Includes Ferritin, Iron Sat%, Iron, and TIBC)    Hemoglobin A1C    Comprehensive metabolic panel    CBC and differential    Iron deficiency anemia, unspecified iron deficiency anemia type    Orders:    Vitamin D 25 hydroxy    Vitamin B12    TSH, 3rd generation with Free T4 reflex    UA w Reflex to Microscopic w Reflex to Culture; Future    Magnesium    Lipid Panel with Direct LDL reflex    Iron Panel (Includes Ferritin, Iron Sat%, Iron, and TIBC)    Hemoglobin A1C    Comprehensive metabolic  panel    CBC and differential    Panic attack    Orders:    Vitamin D 25 hydroxy    Vitamin B12    TSH, 3rd generation with Free T4 reflex    UA w Reflex to Microscopic w Reflex to Culture; Future    Magnesium    Lipid Panel with Direct LDL reflex    Iron Panel (Includes Ferritin, Iron Sat%, Iron, and TIBC)    Hemoglobin A1C    Comprehensive metabolic panel    CBC and differential    Anxiety    Orders:    Vitamin D 25 hydroxy    Vitamin B12    TSH, 3rd generation with Free T4 reflex    UA w Reflex to Microscopic w Reflex to Culture; Future    Magnesium    Lipid Panel with Direct LDL reflex    Iron Panel (Includes Ferritin, Iron Sat%, Iron, and TIBC)    Hemoglobin A1C    Comprehensive metabolic panel    CBC and differential    Insomnia, unspecified type    Orders:    Vitamin D 25 hydroxy    Vitamin B12    TSH, 3rd generation with Free T4 reflex    UA w Reflex to Microscopic w Reflex to Culture; Future    Magnesium    Lipid Panel with Direct LDL reflex    Iron Panel (Includes Ferritin, Iron Sat%, Iron, and TIBC)    Hemoglobin A1C    Comprehensive metabolic panel    CBC and differential    QUEtiapine (SEROquel) 25 mg tablet; Take 1-2 tablets qhs    Ambulatory Referral to Sleep Medicine; Future    Well adult exam    Orders:    Vitamin D 25 hydroxy    Vitamin B12    TSH, 3rd generation with Free T4 reflex    UA w Reflex to Microscopic w Reflex to Culture; Future    Magnesium    Lipid Panel with Direct LDL reflex    Iron Panel (Includes Ferritin, Iron Sat%, Iron, and TIBC)    Hemoglobin A1C    Comprehensive metabolic panel    CBC and differential         History of Present Illness     History of Present Illness  The patient is a 33-year-old female who presents for evaluation of insomnia.    She has been experiencing insomnia for the past year, which has been progressively worsening. Despite trying melatonin and Ativan, the insomnia persists, affecting her quality of life and work. She discontinued Ambien as it was  "ineffective. She also suffers from undiagnosed ADHD. Despite trying various medications for anxiety, the insomnia persists. Over-the-counter sleep aids, such as ZzzQuil, Unisom, and melatonin, have been ineffective. During her worst periods, she can go a day or two without substantial sleep. She has considered visiting the emergency room for this issue. She is not currently employed and had to leave her last job due to sleeplessness. She suspects her insomnia may be hormone-related, as it tends to occur one to two weeks prior to her menstrual cycle. She experienced panic attacks a few years ago.     Review of Systems   Constitutional:  Negative for fever and unexpected weight change.   HENT:  Negative for nosebleeds and trouble swallowing.    Eyes:  Negative for visual disturbance.   Respiratory:  Negative for chest tightness and shortness of breath.    Cardiovascular:  Negative for chest pain, palpitations and leg swelling.   Gastrointestinal:  Negative for abdominal pain, constipation, diarrhea and nausea.   Endocrine: Negative for cold intolerance.   Genitourinary:  Negative for dysuria and urgency.   Musculoskeletal:  Negative for joint swelling and myalgias.   Skin:  Negative for rash.   Neurological:  Negative for tremors, seizures and syncope.   Hematological:  Does not bruise/bleed easily.   Psychiatric/Behavioral:  Positive for sleep disturbance. Negative for hallucinations and suicidal ideas. The patient is nervous/anxious.      Objective     /82 (BP Location: Left arm, Patient Position: Sitting, Cuff Size: Standard)   Pulse 71   Resp 16   Ht 5' 1\" (1.549 m)   Wt 57.6 kg (127 lb)   SpO2 99%   BMI 24.00 kg/m²     Physical Exam    Physical Exam  Vitals and nursing note reviewed.   Constitutional:       Appearance: She is well-developed.   HENT:      Head: Normocephalic and atraumatic.      Right Ear: External ear normal.      Left Ear: External ear normal.      Nose: Nose normal.   Eyes:      " Conjunctiva/sclera: Conjunctivae normal.      Pupils: Pupils are equal, round, and reactive to light.   Cardiovascular:      Rate and Rhythm: Normal rate and regular rhythm.      Heart sounds: Normal heart sounds. No murmur heard.  Pulmonary:      Effort: Pulmonary effort is normal.      Breath sounds: Normal breath sounds. No wheezing.   Abdominal:      General: Bowel sounds are normal.      Palpations: Abdomen is soft.   Musculoskeletal:         General: No tenderness. Normal range of motion.      Cervical back: Normal range of motion and neck supple.   Lymphadenopathy:      Cervical: No cervical adenopathy.   Skin:     General: Skin is warm and dry.      Capillary Refill: Capillary refill takes less than 2 seconds.   Neurological:      Mental Status: She is alert and oriented to person, place, and time.   Psychiatric:         Behavior: Behavior normal.         Thought Content: Thought content normal.         Judgment: Judgment normal.

## 2024-07-19 NOTE — ASSESSMENT & PLAN NOTE
Orders:    Vitamin D 25 hydroxy    Vitamin B12    TSH, 3rd generation with Free T4 reflex    UA w Reflex to Microscopic w Reflex to Culture; Future    Magnesium    Lipid Panel with Direct LDL reflex    Iron Panel (Includes Ferritin, Iron Sat%, Iron, and TIBC)    Hemoglobin A1C    Comprehensive metabolic panel    CBC and differential    QUEtiapine (SEROquel) 25 mg tablet; Take 1-2 tablets qhs    Ambulatory Referral to Sleep Medicine; Future

## 2024-07-19 NOTE — ASSESSMENT & PLAN NOTE
Orders:    Vitamin D 25 hydroxy    Vitamin B12    TSH, 3rd generation with Free T4 reflex    UA w Reflex to Microscopic w Reflex to Culture; Future    Magnesium    Lipid Panel with Direct LDL reflex    Iron Panel (Includes Ferritin, Iron Sat%, Iron, and TIBC)    Hemoglobin A1C    Comprehensive metabolic panel    CBC and differential

## 2024-07-24 ENCOUNTER — OFFICE VISIT (OUTPATIENT)
Dept: SLEEP CENTER | Facility: CLINIC | Age: 34
End: 2024-07-24
Payer: COMMERCIAL

## 2024-07-24 VITALS
BODY MASS INDEX: 23.43 KG/M2 | DIASTOLIC BLOOD PRESSURE: 62 MMHG | SYSTOLIC BLOOD PRESSURE: 110 MMHG | HEIGHT: 61 IN | WEIGHT: 124.1 LBS

## 2024-07-24 DIAGNOSIS — G47.00 INSOMNIA, UNSPECIFIED TYPE: ICD-10-CM

## 2024-07-24 DIAGNOSIS — G25.81 RESTLESS LEG SYNDROME: Primary | ICD-10-CM

## 2024-07-24 DIAGNOSIS — D50.8 OTHER IRON DEFICIENCY ANEMIA: ICD-10-CM

## 2024-07-24 PROCEDURE — 99243 OFF/OP CNSLTJ NEW/EST LOW 30: CPT | Performed by: PSYCHIATRY & NEUROLOGY

## 2024-07-24 NOTE — PATIENT INSTRUCTIONS
"Thank you for trusting me with your care!!! If you have any concerns or questions in follow-up of your visit, please feel free to contact me with the information below.  MyChart messages are preferred though for urgent issues, please call the office as well.    It is important to check a repeat iron panel. It is important to see if your iron level is at goal, or if it is still low.      Testing for iron levels should be done as follows:  Fasting in the morning  Avoid red meat within several days of this blood test  If taking an iron supplement, stop it temporarily at least 2 days prior to this test.      Please complete the iron panel as soon as possible     Please fill out sleep logs and send to me in 2 weeks    With great difficulty falling asleep or with difficulty falling back asleep, laying in bed for a prolonged period time is not ideal.  This can increase worry and rumination (having racing thoughts, not able to \"turn off your brain\".  After what feels like 15 minutes, if you feel wide awake, worried, anxious, or can't clear your brain, it is better to leave the bedroom to do something relaxing , The typical recommendation is to read a boring book in dim light.  In general, if you leave the room, you want to do something that is relaxing, not stimulating. Avoid bright screens, doing work, doing chores, or anything that requires a lot of mental effort. Return to bed when you feel more calm, sleepy, or mentally clear.           IMPORTANT- Prior to a sleep study (at home or in the sleep lab), I strongly recommend contacting your medical insurance first to understand your benefits and coverage for this test.  Even if the test is approved by insurance, the cost to you is determined by your medical benefits.  You can also use [employer] on [facility]'s website, under the drop down \"Patients and Visitors\".  If you have concerns about this price, please contact me/the office before you complete the test.     I " recommend following this advice in general before any lab test, imaging test, doctor visit, surgery, or ordering CPAP supplies, so you understand your coverage and do not receive a bill that was unexpected.       Nursing Support:  When: Monday through Friday 7A-5PM except holidays  Where: Our direct line is [phone].    If you are having a true emergency please call 911.  In the event that the line is busy or it is after hours please leave a voice message and we will return your call.  Please speak clearly, leaving your full name, birth date, best number to reach you and the reason for your call.   Medication refills: We will need the name of the medication, the dosage, the ordering provider, whether you get a 30 or 90 day refill, and the pharmacy name and address.  Medications will be ordered by the provider only.  Nurses cannot call in prescriptions.  Please allow 7 days for medication refills.  Physician requested updates: If your provider requested that you call with an update after starting medication, please be ready to provide us the medication and dosage, what time you take your medication, the time you attempt to fall asleep, time you fall asleep, when you wake up, and what time you get out of bed.  Sleep Study Results: We will contact you with sleep study results and/or next steps after the physician has reviewed your testing.

## 2024-07-24 NOTE — PROGRESS NOTES
"Assessment/Plan:    1. Restless leg syndrome  -     Iron Panel (Includes Ferritin, Iron Sat%, Iron, and TIBC); Future  -     CBC and differential; Future  2. Insomnia, unspecified type  -     Ambulatory Referral to Sleep Medicine  3. Other iron deficiency anemia  -     Iron Panel (Includes Ferritin, Iron Sat%, Iron, and TIBC); Future  -     CBC and differential; Future  We discussed that with regard to restless leg syndrome, it is important to assess for iron deficiency, I have ordered an iron panel and CBC.  She has been prone to anemia in the past.  If iron deficiency is identified, correcting this may improve RLS symptoms.    She has chronic insomnia with poor sleep.  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  I asked her to fill out sleep logs so I can better understand her sleep schedule.  We discussed that with prolonged difficulty falling asleep, she should leave the bedroom and do sitting relaxing outside the bedroom.  She should obtain direct sunlight immediately upon awakening for about half an hour on a consistent basis, that may help facilitate a slow advancement of her circadian rhythm.    Regarding hypnotics, she was prescribed quetiapine, she could try this medication but I would be cautious with this, likely would not be an ideal long-term treatment for insomnia in her case.    At present she does not require a sleep study.  I would like to follow-up with her in about 6 weeks.  I asked her to Semi sleep logs in 2 weeks so I can make further suggestions once I have more information about her sleep schedule.      Subjective:      Patient ID: Samantha Jaramillo is a 33 y.o. female.    HPI    Cc-- severe insomnia, \"particularly bad the past few months\"    This is a 33-year-old female who presents as a new patient with a chief complaint of insomnia.  I reviewed notes from primary " care physician, recent visit they discussed that despite use of zolpidem, lorazepam, sertraline, and BuSpar, she had persistent insomnia.  At her last visit on July 19, 2024 was prescribed quetiapine 25 mg, recommended to increase up to as high as 100 mg.  Has not yet started this.      She has a prior history of anxiety and iron deficiency anemia.  Chart notes as far back as 2021 describe a long history of intermittent anxiety and insomnia, was described to be worsening around 2021.      She describes insomnia became a problem about 7-8 years ago.  No clear trigger at time.     She had worked most recently from March to May on a 10 pm - 6 am schedule. Had not worked overnights before.   She finds that she was able to adapt to the schedule but then develops sleep issues.  Since not working, has not adjusted to a night time sleep schedule.  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      If she could pick an ideal sleep schedule now, it would be going to bed 12- 1 am and waking 8-9 am    Since beginning of May she can't recall sleeping 7 hours  Lives with her mom, dad, and brother  Room is dark when sleeping in the day, has a black-out curtain, room is quiet    Usually is on computer.  Gets sleepy around 7 am when she gets in bed.  Sometimes asleep as soon as 8 am.  Other days is up to 9-11 am.  Once she falls asleep wakes 1-4 times.   Sometimes finds it hard to return to sleep.  Wakes for good at 3-6 pm.   Estimates 3-6 hours of sleep.      She describes feeling sleepy but does not doze.  [place] Sleepiness Scale score is 3.  She denies drowsy driving.    She sleeps alone but denies snoring, gasping in sleep, witnessed breathing pauses    She denies symptoms of sleepwalking, dream enactment. Sometimes has restless legs, every few days or less.   Worse when not sleeping as well.  On an iron supplement, takes it daily     She does not drink caffeine.  She has  "alcohol, 1 drink per day, 1 or 2 days out of the week      [place] Sleepiness Scale  Sitting and reading: Would never doze  Watching TV: Would never doze  Sitting, inactive in a public place (e.g. a theatre or a meeting): Slight chance of dozing  As a passenger in a car for an hour without a break: Would never doze  Lying down to rest in the afternoon when circumstances permit: Slight chance of dozing  Sitting and talking to someone: Would never doze  Sitting quietly after a lunch without alcohol: Slight chance of dozing  In a car, while stopped for a few minutes in traffic: Would never doze  Total score: 3      Review of Systems  (as above unless noted)  Anxiety - pretty well controlled  Not depressed       Objective:      Visit Vitals  /62 (BP Location: Right arm, Patient Position: Sitting, Cuff Size: Standard)   Ht 5' 1\" (1.549 m)   Wt 56.3 kg (124 lb 1.6 oz)   BMI 23.45 kg/m²   OB Status Unknown   Smoking Status Never   BSA 1.54 m²             Physical Exam  Constitutional:       Appearance: Normal appearance.   HENT:      Head: Normocephalic and atraumatic.      Mouth/Throat:      Mouth: Mucous membranes are moist.   Eyes:      Extraocular Movements: Extraocular movements intact.      Pupils: Pupils are equal, round, and reactive to light.   Cardiovascular:      Rate and Rhythm: Normal rate.      Pulses: Normal pulses.      Heart sounds: Normal heart sounds.   Pulmonary:      Effort: Pulmonary effort is normal.      Breath sounds: Normal breath sounds.   Musculoskeletal:      Right lower leg: No edema.      Left lower leg: No edema.   Neurological:      Mental Status: She is alert.   Psychiatric:         Mood and Affect: Mood normal.         Behavior: Behavior normal.         Thought Content: Thought content normal.         Judgment: Judgment normal.           "

## 2024-08-10 ENCOUNTER — APPOINTMENT (EMERGENCY)
Dept: CT IMAGING | Facility: HOSPITAL | Age: 34
End: 2024-08-10
Payer: COMMERCIAL

## 2024-08-10 ENCOUNTER — HOSPITAL ENCOUNTER (EMERGENCY)
Facility: HOSPITAL | Age: 34
Discharge: HOME | End: 2024-08-10
Attending: EMERGENCY MEDICINE
Payer: COMMERCIAL

## 2024-08-10 VITALS
RESPIRATION RATE: 18 BRPM | HEART RATE: 78 BPM | TEMPERATURE: 98.4 F | SYSTOLIC BLOOD PRESSURE: 116 MMHG | DIASTOLIC BLOOD PRESSURE: 82 MMHG | OXYGEN SATURATION: 100 %

## 2024-08-10 DIAGNOSIS — N12 PYELONEPHRITIS: Primary | ICD-10-CM

## 2024-08-10 LAB
ALBUMIN SERPL BCG-MCNC: 4.2 G/DL (ref 3.5–5)
ALP SERPL-CCNC: 58 U/L (ref 34–104)
ALT SERPL W P-5'-P-CCNC: 7 U/L (ref 7–52)
ANION GAP SERPL CALCULATED.3IONS-SCNC: 6 MMOL/L (ref 4–13)
AST SERPL W P-5'-P-CCNC: 21 U/L (ref 13–39)
BACTERIA UR QL AUTO: ABNORMAL /HPF
BASOPHILS # BLD AUTO: 0.03 THOUSANDS/ÂΜL (ref 0–0.1)
BASOPHILS NFR BLD AUTO: 1 % (ref 0–1)
BILIRUB SERPL-MCNC: 0.47 MG/DL (ref 0.2–1)
BILIRUB UR QL STRIP: ABNORMAL
BUN SERPL-MCNC: 11 MG/DL (ref 5–25)
CALCIUM SERPL-MCNC: 9.1 MG/DL (ref 8.4–10.2)
CHLORIDE SERPL-SCNC: 104 MMOL/L (ref 96–108)
CK SERPL-CCNC: 41 U/L (ref 26–192)
CLARITY UR: ABNORMAL
CO2 SERPL-SCNC: 28 MMOL/L (ref 21–32)
COLOR UR: ABNORMAL
CREAT SERPL-MCNC: 0.77 MG/DL (ref 0.6–1.3)
EOSINOPHIL # BLD AUTO: 0.06 THOUSAND/ÂΜL (ref 0–0.61)
EOSINOPHIL NFR BLD AUTO: 1 % (ref 0–6)
ERYTHROCYTE [DISTWIDTH] IN BLOOD BY AUTOMATED COUNT: 12.2 % (ref 11.6–15.1)
GFR SERPL CREATININE-BSD FRML MDRD: 102 ML/MIN/1.73SQ M
GLUCOSE SERPL-MCNC: 98 MG/DL (ref 65–140)
GLUCOSE UR STRIP-MCNC: NEGATIVE MG/DL
HCT VFR BLD AUTO: 45.9 % (ref 34.8–46.1)
HGB BLD-MCNC: 14.3 G/DL (ref 11.5–15.4)
HGB UR QL STRIP.AUTO: ABNORMAL
IMM GRANULOCYTES # BLD AUTO: 0.01 THOUSAND/UL (ref 0–0.2)
IMM GRANULOCYTES NFR BLD AUTO: 0 % (ref 0–2)
KETONES UR STRIP-MCNC: ABNORMAL MG/DL
LEUKOCYTE ESTERASE UR QL STRIP: ABNORMAL
LYMPHOCYTES # BLD AUTO: 1.59 THOUSANDS/ÂΜL (ref 0.6–4.47)
LYMPHOCYTES NFR BLD AUTO: 25 % (ref 14–44)
MCH RBC QN AUTO: 30.7 PG (ref 26.8–34.3)
MCHC RBC AUTO-ENTMCNC: 31.2 G/DL (ref 31.4–37.4)
MCV RBC AUTO: 99 FL (ref 82–98)
MONOCYTES # BLD AUTO: 0.38 THOUSAND/ÂΜL (ref 0.17–1.22)
MONOCYTES NFR BLD AUTO: 6 % (ref 4–12)
NEUTROPHILS # BLD AUTO: 4.39 THOUSANDS/ÂΜL (ref 1.85–7.62)
NEUTS SEG NFR BLD AUTO: 67 % (ref 43–75)
NITRITE UR QL STRIP: POSITIVE
NON-SQ EPI CELLS URNS QL MICRO: ABNORMAL /HPF
NRBC BLD AUTO-RTO: 0 /100 WBCS
PH UR STRIP.AUTO: 6.5 [PH]
PLATELET # BLD AUTO: 215 THOUSANDS/UL (ref 149–390)
PMV BLD AUTO: 11.1 FL (ref 8.9–12.7)
POTASSIUM SERPL-SCNC: 4 MMOL/L (ref 3.5–5.3)
PROT SERPL-MCNC: 6.9 G/DL (ref 6.4–8.4)
PROT UR STRIP-MCNC: ABNORMAL MG/DL
RBC # BLD AUTO: 4.66 MILLION/UL (ref 3.81–5.12)
RBC #/AREA URNS AUTO: ABNORMAL /HPF
SODIUM SERPL-SCNC: 138 MMOL/L (ref 135–147)
SP GR UR STRIP.AUTO: 1.02 (ref 1–1.03)
UROBILINOGEN UR QL STRIP.AUTO: 1 E.U./DL
WBC # BLD AUTO: 6.46 THOUSAND/UL (ref 4.31–10.16)
WBC #/AREA URNS AUTO: ABNORMAL /HPF

## 2024-08-10 PROCEDURE — 96365 THER/PROPH/DIAG IV INF INIT: CPT

## 2024-08-10 PROCEDURE — 87077 CULTURE AEROBIC IDENTIFY: CPT | Performed by: EMERGENCY MEDICINE

## 2024-08-10 PROCEDURE — 87186 SC STD MICRODIL/AGAR DIL: CPT | Performed by: EMERGENCY MEDICINE

## 2024-08-10 PROCEDURE — 96375 TX/PRO/DX INJ NEW DRUG ADDON: CPT

## 2024-08-10 PROCEDURE — 87086 URINE CULTURE/COLONY COUNT: CPT | Performed by: EMERGENCY MEDICINE

## 2024-08-10 PROCEDURE — 36415 COLL VENOUS BLD VENIPUNCTURE: CPT | Performed by: EMERGENCY MEDICINE

## 2024-08-10 PROCEDURE — 99283 EMERGENCY DEPT VISIT LOW MDM: CPT

## 2024-08-10 PROCEDURE — 85025 COMPLETE CBC W/AUTO DIFF WBC: CPT | Performed by: EMERGENCY MEDICINE

## 2024-08-10 PROCEDURE — 80053 COMPREHEN METABOLIC PANEL: CPT | Performed by: EMERGENCY MEDICINE

## 2024-08-10 PROCEDURE — 74176 CT ABD & PELVIS W/O CONTRAST: CPT

## 2024-08-10 PROCEDURE — 96361 HYDRATE IV INFUSION ADD-ON: CPT

## 2024-08-10 PROCEDURE — 82550 ASSAY OF CK (CPK): CPT | Performed by: EMERGENCY MEDICINE

## 2024-08-10 PROCEDURE — 99284 EMERGENCY DEPT VISIT MOD MDM: CPT | Performed by: EMERGENCY MEDICINE

## 2024-08-10 PROCEDURE — 81001 URINALYSIS AUTO W/SCOPE: CPT | Performed by: EMERGENCY MEDICINE

## 2024-08-10 RX ORDER — CEFTRIAXONE 1 G/50ML
1000 INJECTION, SOLUTION INTRAVENOUS ONCE
Status: COMPLETED | OUTPATIENT
Start: 2024-08-10 | End: 2024-08-10

## 2024-08-10 RX ORDER — ONDANSETRON 2 MG/ML
4 INJECTION INTRAMUSCULAR; INTRAVENOUS ONCE
Status: COMPLETED | OUTPATIENT
Start: 2024-08-10 | End: 2024-08-10

## 2024-08-10 RX ORDER — CEFDINIR 300 MG/1
300 CAPSULE ORAL EVERY 12 HOURS SCHEDULED
Qty: 10 CAPSULE | Refills: 0 | Status: SHIPPED | OUTPATIENT
Start: 2024-08-10 | End: 2024-08-15

## 2024-08-10 RX ORDER — KETOROLAC TROMETHAMINE 30 MG/ML
15 INJECTION, SOLUTION INTRAMUSCULAR; INTRAVENOUS ONCE
Status: COMPLETED | OUTPATIENT
Start: 2024-08-10 | End: 2024-08-10

## 2024-08-10 RX ADMIN — CEFTRIAXONE 1000 MG: 1 INJECTION, SOLUTION INTRAVENOUS at 19:51

## 2024-08-10 RX ADMIN — KETOROLAC TROMETHAMINE 15 MG: 30 INJECTION, SOLUTION INTRAMUSCULAR at 19:22

## 2024-08-10 RX ADMIN — ONDANSETRON 4 MG: 2 INJECTION INTRAMUSCULAR; INTRAVENOUS at 19:22

## 2024-08-10 RX ADMIN — SODIUM CHLORIDE 1000 ML: 0.9 INJECTION, SOLUTION INTRAVENOUS at 19:20

## 2024-08-10 NOTE — ED NOTES
Patient unable to give urine sample at this time, aware sample is needed     Jodi Dawn, ZULEIMA  08/10/24 [id number]

## 2024-08-10 NOTE — ED PROVIDER NOTES
History  Chief Complaint   Patient presents with    Possible UTI     Ambulatory patient presents with c/o urine being darker than usual, noticed this today. Also reports yesterday noticed lower back/abd pain; discomfort/stinging at end of voids.      The patient reports 1 week history of an uncomfortable pressure-like pain midstream when urinating.  She also reports increased frequency.  Yesterday and today, she has had left flank pain that is mild to moderate in intensity and nausea without vomiting.  Pain radiates to left flank but not the right.  No fevers.  No prior history of kidney stones, but she does have a family history of stones.  Today she reports darker urine than normal.  She denies recent exercise or increased physical activity.          Prior to Admission Medications   Prescriptions Last Dose Informant Patient Reported? Taking?   QUEtiapine (SEROquel) 25 mg tablet   No No   Sig: Take 1-2 tablets qhs   ferrous sulfate 324 (65 Fe) mg  Self No No   Sig: TAKE 1 TABLET (324 MG TOTAL) BY MOUTH 2 (TWO) TIMES A DAY BEFORE MEALS      Facility-Administered Medications: None       Past Medical History:   Diagnosis Date    Iron deficiency        Past Surgical History:   Procedure Laterality Date    [place] LAPAROSCOPY W/RMVL ADNEXAL STRUCTURES Bilateral 9/1/2020    Procedure: LAPAROSCOPIC SALPINGECTOMY;  Surgeon: Jodi Moon DO;  [facility]: [facility];  Service: Gynecology    TUBAL LIGATION      WISDOM TOOTH EXTRACTION         Family History   Problem Relation Age of Onset    Hypertension Mother     Endometrial cancer Mother     Hyperlipidemia Mother     Stroke Mother     Hypertension Father     Autism Brother     Other Paternal Grandmother         lymes    No Known Problems Paternal Grandfather     No Known Problems Half-Brother     No Known Problems Half-Brother      I have reviewed and agree with the history as documented.    E-Cigarette/Vaping    E-Cigarette Use Never User      E-Cigarette/Vaping  Substances    Nicotine No     THC No     CBD No     Flavoring No     Other No     Unknown No      Social History     Tobacco Use    Smoking status: Never    Smokeless tobacco: Never   Vaping Use    Vaping status: Never Used   Substance Use Topics    Alcohol use: Yes     Comment: occasionally     Drug use: Never       Review of Systems   All other systems reviewed and are negative.      Physical Exam  Physical Exam  Vitals and nursing note reviewed.   Constitutional:       General: She is not in acute distress.     Appearance: She is well-developed.   HENT:      Head: Normocephalic and atraumatic.   Eyes:      Conjunctiva/sclera: Conjunctivae normal.   Cardiovascular:      Rate and Rhythm: Normal rate and regular rhythm.      Heart sounds: No murmur heard.  Pulmonary:      Effort: Pulmonary effort is normal. No respiratory distress.      Breath sounds: Normal breath sounds.   Abdominal:      Palpations: Abdomen is soft.      Tenderness: There is abdominal tenderness (very mild LLQ.  No right sided tenderness). There is left CVA tenderness (mild). There is no right CVA tenderness.   Musculoskeletal:         General: No swelling.      Cervical back: Neck supple.   Skin:     General: Skin is warm and dry.      Capillary Refill: Capillary refill takes less than 2 seconds.   Neurological:      Mental Status: She is alert.   Psychiatric:         Mood and Affect: Mood normal.         Vital Signs  ED Triage Vitals [08/10/24 1843]   Temperature Pulse Respirations Blood Pressure SpO2   98.4 °F (36.9 °C) (!) 108 16 130/83 100 %      Temp Source Heart Rate Source Patient Position - Orthostatic VS BP Location FiO2 (%)   Oral Monitor Lying Left arm --      Pain Score       --           Vitals:    08/10/24 1843 08/10/24 2029   BP: 130/83 116/82   Pulse: (!) 108 78   Patient Position - Orthostatic VS: Lying Sitting         Visual Acuity      ED Medications  Medications   sodium chloride 0.9 % bolus 1,000 mL (0 mL Intravenous Stopped  8/10/24 2028)   ketorolac (TORADOL) injection 15 mg (15 mg Intravenous Given 8/10/24 1922)   ondansetron (ZOFRAN) injection 4 mg (4 mg Intravenous Given 8/10/24 1922)   cefTRIAXone (ROCEPHIN) IVPB (premix in dextrose) 1,000 mg 50 mL (0 mg Intravenous Stopped 8/10/24 2028)       Diagnostic Studies  Results Reviewed       Procedure Component Value Units Date/Time    Comprehensive metabolic panel [[id number]] Collected: 08/10/24 1951    Lab Status: Final result Specimen: Blood from Arm, Left Updated: 08/10/24 2012     Sodium 138 mmol/L      Potassium 4.0 mmol/L      Chloride 104 mmol/L      CO2 28 mmol/L      ANION GAP 6 mmol/L      BUN 11 mg/dL      Creatinine 0.77 mg/dL      Glucose 98 mg/dL      Calcium 9.1 mg/dL      AST 21 U/L      ALT 7 U/L      Alkaline Phosphatase 58 U/L      Total Protein 6.9 g/dL      Albumin 4.2 g/dL      Total Bilirubin 0.47 mg/dL      eGFR 102 ml/min/1.73sq m     Narrative:      National Kidney Disease Foundation guidelines for Chronic Kidney Disease (CKD):     Stage 1 with normal or high GFR (GFR > 90 mL/min/1.73 square meters)    Stage 2 Mild CKD (GFR = 60-89 mL/min/1.73 square meters)    Stage 3A Moderate CKD (GFR = 45-59 mL/min/1.73 square meters)    Stage 3B Moderate CKD (GFR = 30-44 mL/min/1.73 square meters)    Stage 4 Severe CKD (GFR = 15-29 mL/min/1.73 square meters)    Stage 5 End Stage CKD (GFR <15 mL/min/1.73 square meters)  Note: GFR calculation is accurate only with a steady state creatinine    CK [[id number]]  (Normal) Collected: 08/10/24 1951    Lab Status: Final result Specimen: Blood from Arm, Left Updated: 08/10/24 2012     Total CK 41 U/L     Urine Microscopic [[id number]]  (Abnormal) Collected: 08/10/24 1922    Lab Status: Final result Specimen: Urine, Other Updated: 08/10/24 1937     RBC, UA       Field obscured, unable to enumerate     /hpf     WBC, UA       Field obscured, unable to enumerate     /hpf     Epithelial Cells       Field obscured, unable to enumerate      /hpf     Bacteria, UA       Field obscured, unable to enumerate     /hpf    Urine culture [[id number]] Collected: 08/10/24 1922    Lab Status: In process Specimen: Urine, Other Updated: 08/10/24 1937    UA w Reflex to Microscopic w Reflex to Culture [[id number]]  (Abnormal) Collected: 08/10/24 1922    Lab Status: Final result Specimen: Urine, Other Updated: 08/10/24 1931     Color, UA Brown     Clarity, UA Turbid     Specific Gravity, UA 1.025     pH, UA 6.5     Leukocytes, UA 3+     Nitrite, UA Positive     Protein, UA 2+ mg/dl      Glucose, UA Negative mg/dl      Ketones, UA Trace mg/dl      Urobilinogen, UA 1.0 E.U./dl      Bilirubin, UA 1+     Occult Blood, UA 3+    CBC and differential [[id number]]  (Abnormal) Collected: 08/10/24 1908    Lab Status: Final result Specimen: Blood from Arm, Left Updated: 08/10/24 1912     WBC 6.46 Thousand/uL      RBC 4.66 Million/uL      Hemoglobin 14.3 g/dL      Hematocrit 45.9 %      MCV 99 fL      MCH 30.7 pg      MCHC 31.2 g/dL      RDW 12.2 %      MPV 11.1 fL      Platelets 215 Thousands/uL      nRBC 0 /100 WBCs      Segmented % 67 %      Immature Grans % 0 %      Lymphocytes % 25 %      Monocytes % 6 %      Eosinophils Relative 1 %      Basophils Relative 1 %      Absolute Neutrophils 4.39 Thousands/µL      Absolute Immature Grans 0.01 Thousand/uL      Absolute Lymphocytes 1.59 Thousands/µL      Absolute Monocytes 0.38 Thousand/µL      Eosinophils Absolute 0.06 Thousand/µL      Basophils Absolute 0.03 Thousands/µL                    CT renal stone study abdomen pelvis without contrast   Final Result by Jose Alberto Mendoza MD (08/10 2011)      No urinary tract calculi or obstructive uropathy.      Questionable diffuse urinary bladder wall thickening which may be secondary to under distention versus a UTI/acute cystitis. Correlation with a urinalysis is recommended. Evaluation of the renal parenchyma is also limited by the lack of intravenous    contrast and therefore  pyelonephritis cannot be excluded.      Workstation performed: [id number]                    Procedures  Procedures         ED Course  ED Course as of 08/11/24 0324   Sat Aug 10, 2024   2015 Patient reassessed.  She feels improved with medications.  In light of CT findings and back pain, findings most consistent with early pyelonephritis.  Patient treated aggressively with IV antibiotics.  Indications to return to the ED discussed.                                 SBIRT 22yo+      Flowsheet Row Most Recent Value   Initial Alcohol Screen: US AUDIT-C     1. How often do you have a drink containing alcohol? 0 Filed at: 08/10/2024 1845   2. How many drinks containing alcohol do you have on a typical day you are drinking?  0 Filed at: 08/10/2024 1845   3b. FEMALE Any Age, or MALE 65+: How often do you have 4 or more drinks on one occassion? 0 Filed at: 08/10/2024 1845   Audit-C Score 0 Filed at: 08/10/2024 1845   CAMMY: How many times in the past year have you...    Used an illegal drug or used a prescription medication for non-medical reasons? Never Filed at: 08/10/2024 1845                      Medical Decision Making  Differential includes kidney stone, renal colic.  Less likely hyperbilirubinemia, doubt rhabdo.  Awaiting urine and CT to evaluate for infection/stone.    Amount and/or Complexity of Data Reviewed  External Data Reviewed: notes.     Details: Seen by PMD for anxiety after ED visit for chest pain.  Labs: ordered.  Radiology: ordered.    Risk  Prescription drug management.                 Disposition  Final diagnoses:   Pyelonephritis     Time reflects when diagnosis was documented in both MDM as applicable and the Disposition within this note       Time User Action Codes Description Comment    8/10/2024  8:24 PM Bennie Izquierdo Add [N12] Pyelonephritis           ED Disposition       ED Disposition   Discharge    Condition   Stable    Date/Time   Sat Aug 10, 2024 2024    Comment   Samantha Jaramillo discharge to  home/self care.                   Follow-up Information       Follow up With Specialties Details Why Contact Info    Pau Jordan MD Family Medicine   [address] [address]  [phone]              Discharge Medication List as of 8/10/2024  8:24 PM        START taking these medications    Details   cefdinir (OMNICEF) 300 mg capsule Take 1 capsule (300 mg total) by mouth every 12 (twelve) hours for 5 days, Starting Sat 8/10/2024, Until Thu 8/15/2024, Normal           CONTINUE these medications which have NOT CHANGED    Details   ferrous sulfate 324 (65 Fe) mg TAKE 1 TABLET (324 MG TOTAL) BY MOUTH 2 (TWO) TIMES A DAY BEFORE MEALS, Starting Tue 6/15/2021, Normal      QUEtiapine (SEROquel) 25 mg tablet Take 1-2 tablets qhs, Normal             No discharge procedures on file.    PDMP Review         Value Time User    PDMP Reviewed  Yes 4/11/2024  5:13 PM Pau Jordan MD            ED Provider  Electronically Signed by             Bennie Izquierdo MD  08/11/24 0325

## 2024-08-12 LAB
BACTERIA UR CULT: ABNORMAL
BACTERIA UR CULT: ABNORMAL

## 2024-09-06 ENCOUNTER — HOSPITAL ENCOUNTER (EMERGENCY)
Facility: HOSPITAL | Age: 34
Discharge: HOME | End: 2024-09-06
Attending: EMERGENCY MEDICINE
Payer: COMMERCIAL

## 2024-09-06 ENCOUNTER — APPOINTMENT (EMERGENCY)
Dept: RADIOLOGY | Facility: HOSPITAL | Age: 34
End: 2024-09-06
Payer: COMMERCIAL

## 2024-09-06 VITALS
SYSTOLIC BLOOD PRESSURE: 118 MMHG | RESPIRATION RATE: 16 BRPM | DIASTOLIC BLOOD PRESSURE: 60 MMHG | OXYGEN SATURATION: 95 % | TEMPERATURE: 98.2 F | HEART RATE: 89 BPM

## 2024-09-06 DIAGNOSIS — R07.9 CHEST PAIN, UNSPECIFIED TYPE: Primary | ICD-10-CM

## 2024-09-06 LAB
ALBUMIN SERPL BCG-MCNC: 4.4 G/DL (ref 3.5–5)
ALP SERPL-CCNC: 49 U/L (ref 34–104)
ALT SERPL W P-5'-P-CCNC: 7 U/L (ref 7–52)
ANION GAP SERPL CALCULATED.3IONS-SCNC: 7 MMOL/L (ref 4–13)
AST SERPL W P-5'-P-CCNC: 16 U/L (ref 13–39)
ATRIAL RATE: 94 BPM
BASOPHILS # BLD AUTO: 0.03 THOUSANDS/ÂΜL (ref 0–0.1)
BASOPHILS NFR BLD AUTO: 0 % (ref 0–1)
BILIRUB SERPL-MCNC: 0.29 MG/DL (ref 0.2–1)
BUN SERPL-MCNC: 15 MG/DL (ref 5–25)
CALCIUM SERPL-MCNC: 9.5 MG/DL (ref 8.4–10.2)
CARDIAC TROPONIN I PNL SERPL HS: <2 NG/L
CHLORIDE SERPL-SCNC: 104 MMOL/L (ref 96–108)
CO2 SERPL-SCNC: 27 MMOL/L (ref 21–32)
CREAT SERPL-MCNC: 0.7 MG/DL (ref 0.6–1.3)
EOSINOPHIL # BLD AUTO: 0.04 THOUSAND/ÂΜL (ref 0–0.61)
EOSINOPHIL NFR BLD AUTO: 1 % (ref 0–6)
ERYTHROCYTE [DISTWIDTH] IN BLOOD BY AUTOMATED COUNT: 12.3 % (ref 11.6–15.1)
EXT PREGNANCY TEST URINE: NEGATIVE
EXT. CONTROL: NORMAL
GFR SERPL CREATININE-BSD FRML MDRD: 114 ML/MIN/1.73SQ M
GLUCOSE SERPL-MCNC: 113 MG/DL (ref 65–140)
HCT VFR BLD AUTO: 39.8 % (ref 34.8–46.1)
HGB BLD-MCNC: 13.3 G/DL (ref 11.5–15.4)
IMM GRANULOCYTES # BLD AUTO: 0.03 THOUSAND/UL (ref 0–0.2)
IMM GRANULOCYTES NFR BLD AUTO: 0 % (ref 0–2)
LYMPHOCYTES # BLD AUTO: 2.44 THOUSANDS/ÂΜL (ref 0.6–4.47)
LYMPHOCYTES NFR BLD AUTO: 29 % (ref 14–44)
MCH RBC QN AUTO: 30.9 PG (ref 26.8–34.3)
MCHC RBC AUTO-ENTMCNC: 33.4 G/DL (ref 31.4–37.4)
MCV RBC AUTO: 93 FL (ref 82–98)
MONOCYTES # BLD AUTO: 0.44 THOUSAND/ÂΜL (ref 0.17–1.22)
MONOCYTES NFR BLD AUTO: 5 % (ref 4–12)
NEUTROPHILS # BLD AUTO: 5.49 THOUSANDS/ÂΜL (ref 1.85–7.62)
NEUTS SEG NFR BLD AUTO: 65 % (ref 43–75)
NRBC BLD AUTO-RTO: 0 /100 WBCS
P AXIS: 63 DEGREES
PLATELET # BLD AUTO: 254 THOUSANDS/UL (ref 149–390)
PMV BLD AUTO: 10.6 FL (ref 8.9–12.7)
POTASSIUM SERPL-SCNC: 3.9 MMOL/L (ref 3.5–5.3)
PR INTERVAL: 138 MS
PROT SERPL-MCNC: 7.2 G/DL (ref 6.4–8.4)
QRS AXIS: 66 DEGREES
QRSD INTERVAL: 80 MS
QT INTERVAL: 358 MS
QTC INTERVAL: 447 MS
RBC # BLD AUTO: 4.3 MILLION/UL (ref 3.81–5.12)
SODIUM SERPL-SCNC: 138 MMOL/L (ref 135–147)
T WAVE AXIS: 62 DEGREES
VENTRICULAR RATE: 94 BPM
WBC # BLD AUTO: 8.47 THOUSAND/UL (ref 4.31–10.16)

## 2024-09-06 PROCEDURE — 85025 COMPLETE CBC W/AUTO DIFF WBC: CPT | Performed by: EMERGENCY MEDICINE

## 2024-09-06 PROCEDURE — 81025 URINE PREGNANCY TEST: CPT | Performed by: EMERGENCY MEDICINE

## 2024-09-06 PROCEDURE — 80053 COMPREHEN METABOLIC PANEL: CPT | Performed by: EMERGENCY MEDICINE

## 2024-09-06 PROCEDURE — 93005 ELECTROCARDIOGRAM TRACING: CPT

## 2024-09-06 PROCEDURE — 93010 ELECTROCARDIOGRAM REPORT: CPT | Performed by: INTERNAL MEDICINE

## 2024-09-06 PROCEDURE — 99285 EMERGENCY DEPT VISIT HI MDM: CPT

## 2024-09-06 PROCEDURE — 71045 X-RAY EXAM CHEST 1 VIEW: CPT

## 2024-09-06 PROCEDURE — 99285 EMERGENCY DEPT VISIT HI MDM: CPT | Performed by: EMERGENCY MEDICINE

## 2024-09-06 PROCEDURE — 84484 ASSAY OF TROPONIN QUANT: CPT | Performed by: EMERGENCY MEDICINE

## 2024-09-06 PROCEDURE — 36415 COLL VENOUS BLD VENIPUNCTURE: CPT

## 2024-09-06 NOTE — ED PROVIDER NOTES
History  Chief Complaint   Patient presents with    Chest Pain     Pt had new sudden onset of CP on left sided chest wrapping around to back, denies pain down left armor up jaw. Pain has decreased since, denies SOB. No cardiac hx       Chest Pain  Associated symptoms: no abdominal pain, no back pain, no cough, no fever, no palpitations, no shortness of breath and not vomiting        34-year-old female, past medical tree below, presenting with sudden onset chest pain the left side of the chest for the last 2 hours.  Patient denies shortness of breath, dyspnea on exertion, lower extremity edema, calf pain or dissymmetry, history of DVT or PE.  Father at bedside.  Patient notes situation is reminiscent of previous panic attacks but has been without for quite some time now.  No recent psychosocial stressors.    Prior to Admission Medications   Prescriptions Last Dose Informant Patient Reported? Taking?   QUEtiapine (SEROquel) 25 mg tablet   No No   Sig: Take 1-2 tablets qhs   ferrous sulfate 324 (65 Fe) mg  Self No No   Sig: TAKE 1 TABLET (324 MG TOTAL) BY MOUTH 2 (TWO) TIMES A DAY BEFORE MEALS      Facility-Administered Medications: None       Past Medical History:   Diagnosis Date    Iron deficiency        Past Surgical History:   Procedure Laterality Date    [place] LAPAROSCOPY W/RMVL ADNEXAL STRUCTURES Bilateral 9/1/2020    Procedure: LAPAROSCOPIC SALPINGECTOMY;  Surgeon: Jodi Moon DO;  [facility];  Service: Gynecology    TUBAL LIGATION      WISDOM TOOTH EXTRACTION         Family History   Problem Relation Age of Onset    Hypertension Mother     Endometrial cancer Mother     Hyperlipidemia Mother     Stroke Mother     Hypertension Father     Autism Brother     Other Paternal Grandmother         lymes    No Known Problems Paternal Grandfather     No Known Problems Half-Brother     No Known Problems Half-Brother      I have reviewed and agree with the history as documented.    E-Cigarette/Vaping     E-Cigarette Use Never User      E-Cigarette/Vaping Substances    Nicotine No     THC No     CBD No     Flavoring No     Other No     Unknown No      Social History     Tobacco Use    Smoking status: Never    Smokeless tobacco: Never   Vaping Use    Vaping status: Never Used   Substance Use Topics    Alcohol use: Yes     Comment: occasionally     Drug use: Never       Review of Systems   Constitutional:  Negative for chills and fever.   HENT:  Negative for ear pain and sore throat.    Eyes:  Negative for pain and visual disturbance.   Respiratory:  Negative for cough and shortness of breath.    Cardiovascular:  Positive for chest pain. Negative for palpitations.   Gastrointestinal:  Negative for abdominal pain and vomiting.   Genitourinary:  Negative for dysuria and hematuria.   Musculoskeletal:  Negative for arthralgias and back pain.   Skin:  Negative for color change and rash.   Neurological:  Negative for seizures and syncope.   All other systems reviewed and are negative.      Physical Exam  Physical Exam  Vitals and nursing note reviewed.   Constitutional:       General: She is not in acute distress.     Appearance: She is well-developed.   HENT:      Head: Normocephalic and atraumatic.   Eyes:      Conjunctiva/sclera: Conjunctivae normal.   Cardiovascular:      Rate and Rhythm: Normal rate and regular rhythm.      Heart sounds: No murmur heard.  Pulmonary:      Effort: Pulmonary effort is normal. No respiratory distress.      Breath sounds: Normal breath sounds.   Abdominal:      Palpations: Abdomen is soft.      Tenderness: There is no abdominal tenderness.   Musculoskeletal:         General: No swelling.      Cervical back: Neck supple.      Right lower leg: No tenderness. No edema.      Left lower leg: No tenderness. No edema.   Skin:     General: Skin is warm and dry.      Capillary Refill: Capillary refill takes less than 2 seconds.   Neurological:      Mental Status: She is alert.   Psychiatric:          Mood and Affect: Mood normal.         Vital Signs  ED Triage Vitals   Temperature Pulse Respirations Blood Pressure SpO2   09/06/24 1126 09/06/24 1124 09/06/24 1124 09/06/24 1124 09/06/24 1124   98.2 °F (36.8 °C) 89 16 118/60 95 %      Temp Source Heart Rate Source Patient Position - Orthostatic VS BP Location FiO2 (%)   09/06/24 1126 09/06/24 1124 09/06/24 1124 09/06/24 1124 --   Oral Monitor Lying Right arm       Pain Score       09/06/24 1124       3           Vitals:    09/06/24 1124   BP: 118/60   Pulse: 89   Patient Position - Orthostatic VS: Lying         Visual Acuity      ED Medications  Medications - No data to display    Diagnostic Studies  Results Reviewed       Procedure Component Value Units Date/Time    POCT pregnancy, urine [[id number]]  (Normal) Resulted: 09/06/24 1243    Lab Status: Final result Updated: 09/06/24 1243     EXT Preg Test, Ur Negative     Control Valid    HS Troponin 0hr (reflex protocol) [[id number]]  (Normal) Collected: 09/06/24 1133    Lab Status: Final result Specimen: Blood from Arm, Left Updated: 09/06/24 1202     hs TnI 0hr <2 ng/L     Comprehensive metabolic panel [[id number]] Collected: 09/06/24 1133    Lab Status: Final result Specimen: Blood from Arm, Left Updated: 09/06/24 1155     Sodium 138 mmol/L      Potassium 3.9 mmol/L      Chloride 104 mmol/L      CO2 27 mmol/L      ANION GAP 7 mmol/L      BUN 15 mg/dL      Creatinine 0.70 mg/dL      Glucose 113 mg/dL      Calcium 9.5 mg/dL      AST 16 U/L      ALT 7 U/L      Alkaline Phosphatase 49 U/L      Total Protein 7.2 g/dL      Albumin 4.4 g/dL      Total Bilirubin 0.29 mg/dL      eGFR 114 ml/min/1.73sq m     Narrative:      National Kidney Disease Foundation guidelines for Chronic Kidney Disease (CKD):     Stage 1 with normal or high GFR (GFR > 90 mL/min/1.73 square meters)    Stage 2 Mild CKD (GFR = 60-89 mL/min/1.73 square meters)    Stage 3A Moderate CKD (GFR = 45-59 mL/min/1.73 square meters)    Stage 3B Moderate CKD (GFR  = 30-44 mL/min/1.73 square meters)    Stage 4 Severe CKD (GFR = 15-29 mL/min/1.73 square meters)    Stage 5 End Stage CKD (GFR <15 mL/min/1.73 square meters)  Note: GFR calculation is accurate only with a steady state creatinine    CBC and differential [[id number]] Collected: 09/06/24 1133    Lab Status: Final result Specimen: Blood from Arm, Left Updated: 09/06/24 1140     WBC 8.47 Thousand/uL      RBC 4.30 Million/uL      Hemoglobin 13.3 g/dL      Hematocrit 39.8 %      MCV 93 fL      MCH 30.9 pg      MCHC 33.4 g/dL      RDW 12.3 %      MPV 10.6 fL      Platelets 254 Thousands/uL      nRBC 0 /100 WBCs      Segmented % 65 %      Immature Grans % 0 %      Lymphocytes % 29 %      Monocytes % 5 %      Eosinophils Relative 1 %      Basophils Relative 0 %      Absolute Neutrophils 5.49 Thousands/µL      Absolute Immature Grans 0.03 Thousand/uL      Absolute Lymphocytes 2.44 Thousands/µL      Absolute Monocytes 0.44 Thousand/µL      Eosinophils Absolute 0.04 Thousand/µL      Basophils Absolute 0.03 Thousands/µL                    XR chest 1 view portable   Final Result by Juan Cat MD (09/06 1306)      No acute cardiopulmonary disease.            Workstation performed: [id number]                    Procedures  ECG 12 Lead Documentation Only    Date/Time: 9/6/2024 3:54 PM    Performed by: Arnulfo Singletary DO  Authorized by: Arnulfo Singletary DO    Indications / Diagnosis:  Chest pain  ECG reviewed by me, the ED Provider: yes    Patient location:  ED  Previous ECG:     Previous ECG:  Compared to current  Comments:      NSR without signs of ischemia, infarction, dysrhythmia.           ED Course                                               Medical Decision Making  34-year-old female presenting to the emergency department status post a resolved chest pain.  No signs of ACS.  EKG without acute abnormality.  Troponin normal.  CBC, CMP without acute abnormality.  Patient not requiring analgesia here.  Chest x-ray without  acute pathology.  Patient questions whether or not this could potentially be her anxiety for which I note that there appears not to be any active cardiopulmonary involvement at this time acutely and she should follow-up with her primary care physician moving forward. Reviewed all findings both relevant and incidental with the patient at bedside. Pt verbalized understanding of findings, neccesary follow up, return to ED precautions. Pt agreed to review today's findings with their primary care provider. Pt non-toxic appearing upon discharge.       Amount and/or Complexity of Data Reviewed  Independent Historian: parent  External Data Reviewed: notes.  Labs: ordered.  Radiology: ordered and independent interpretation performed.  ECG/medicine tests: ordered and independent interpretation performed.                 Disposition  Final diagnoses:   Chest pain, unspecified type     Time reflects when diagnosis was documented in both MDM as applicable and the Disposition within this note       Time User Action Codes Description Comment    9/6/2024 12:44 PM Arnulfo Singletary [R07.9] Chest pain, unspecified type           ED Disposition       ED Disposition   Discharge    Condition   Stable    Date/Time   Fri Sep 6, 2024 12:44 PM    Comment   Samantha Jaramillo discharge to home/self care.                   Follow-up Information       Follow up With Specialties Details Why Contact Info    Jonathan Joseph MD Family Medicine Call  As needed [address] [facility] [address]  [facility] [address]  [phone]              Discharge Medication List as of 9/6/2024 12:44 PM        CONTINUE these medications which have NOT CHANGED    Details   ferrous sulfate 324 (65 Fe) mg TAKE 1 TABLET (324 MG TOTAL) BY MOUTH 2 (TWO) TIMES A DAY BEFORE MEALS, Starting Tue 6/15/2021, Normal      QUEtiapine (SEROquel) 25 mg tablet Take 1-2 tablets qhs, Normal             No discharge procedures on file.    PDMP Review         Value Time User    PDMP Reviewed  Yes  4/11/2024  5:13 PM Pau Jordan MD            ED Provider  Electronically Signed by             Arnulfo Singletary DO  09/06/24 1554

## 2024-09-06 NOTE — Clinical Note
Samantha Jaramillo was seen and treated in our emergency department on 9/6/2024.    No restrictions            Diagnosis:     Samantha  .    She may return on this date: 09/07/2024         If you have any questions or concerns, please don't hesitate to call.      Arnulfo Singletary, DO    ______________________________           _______________          _______________  Hospital Representative                              Date                                Time

## 2024-09-09 ENCOUNTER — APPOINTMENT (OUTPATIENT)
Dept: LAB | Facility: CLINIC | Age: 34
End: 2024-09-09
Payer: COMMERCIAL

## 2024-09-09 DIAGNOSIS — G47.00 INSOMNIA, UNSPECIFIED TYPE: ICD-10-CM

## 2024-09-09 DIAGNOSIS — G25.81 RESTLESS LEG SYNDROME: ICD-10-CM

## 2024-09-09 DIAGNOSIS — F98.8 ATTENTION DEFICIT DISORDER (ADD) WITHOUT HYPERACTIVITY: ICD-10-CM

## 2024-09-09 DIAGNOSIS — E78.2 MIXED HYPERLIPIDEMIA: ICD-10-CM

## 2024-09-09 DIAGNOSIS — F41.9 ANXIETY: ICD-10-CM

## 2024-09-09 DIAGNOSIS — F41.0 PANIC ATTACK: ICD-10-CM

## 2024-09-09 DIAGNOSIS — Z00.00 WELL ADULT EXAM: ICD-10-CM

## 2024-09-09 DIAGNOSIS — D50.9 IRON DEFICIENCY ANEMIA, UNSPECIFIED IRON DEFICIENCY ANEMIA TYPE: ICD-10-CM

## 2024-09-09 DIAGNOSIS — D50.8 OTHER IRON DEFICIENCY ANEMIA: ICD-10-CM

## 2024-09-09 LAB
25(OH)D3 SERPL-MCNC: 26.2 NG/ML (ref 30–100)
ALBUMIN SERPL BCG-MCNC: 4.3 G/DL (ref 3.5–5)
ALP SERPL-CCNC: 49 U/L (ref 34–104)
ALT SERPL W P-5'-P-CCNC: 7 U/L (ref 7–52)
ANION GAP SERPL CALCULATED.3IONS-SCNC: 8 MMOL/L (ref 4–13)
AST SERPL W P-5'-P-CCNC: 17 U/L (ref 13–39)
BACTERIA UR QL AUTO: ABNORMAL /HPF
BASOPHILS # BLD AUTO: 0.03 THOUSANDS/ÂΜL (ref 0–0.1)
BASOPHILS NFR BLD AUTO: 1 % (ref 0–1)
BILIRUB SERPL-MCNC: 0.42 MG/DL (ref 0.2–1)
BILIRUB UR QL STRIP: NEGATIVE
BUN SERPL-MCNC: 10 MG/DL (ref 5–25)
CALCIUM SERPL-MCNC: 9.4 MG/DL (ref 8.4–10.2)
CHLORIDE SERPL-SCNC: 103 MMOL/L (ref 96–108)
CHOLEST SERPL-MCNC: 231 MG/DL
CLARITY UR: ABNORMAL
CO2 SERPL-SCNC: 27 MMOL/L (ref 21–32)
COLOR UR: YELLOW
CREAT SERPL-MCNC: 0.55 MG/DL (ref 0.6–1.3)
EOSINOPHIL # BLD AUTO: 0.07 THOUSAND/ÂΜL (ref 0–0.61)
EOSINOPHIL NFR BLD AUTO: 1 % (ref 0–6)
ERYTHROCYTE [DISTWIDTH] IN BLOOD BY AUTOMATED COUNT: 12.4 % (ref 11.6–15.1)
FERRITIN SERPL-MCNC: 16 NG/ML (ref 11–307)
GFR SERPL CREATININE-BSD FRML MDRD: 123 ML/MIN/1.73SQ M
GLUCOSE P FAST SERPL-MCNC: 100 MG/DL (ref 65–99)
GLUCOSE UR STRIP-MCNC: NEGATIVE MG/DL
HCT VFR BLD AUTO: 40.5 % (ref 34.8–46.1)
HDLC SERPL-MCNC: 42 MG/DL
HGB BLD-MCNC: 13 G/DL (ref 11.5–15.4)
HGB UR QL STRIP.AUTO: ABNORMAL
IMM GRANULOCYTES # BLD AUTO: 0.03 THOUSAND/UL (ref 0–0.2)
IMM GRANULOCYTES NFR BLD AUTO: 1 % (ref 0–2)
IRON SATN MFR SERPL: 18 % (ref 15–50)
IRON SERPL-MCNC: 61 UG/DL (ref 50–212)
KETONES UR STRIP-MCNC: NEGATIVE MG/DL
LDLC SERPL CALC-MCNC: 169 MG/DL (ref 0–100)
LEUKOCYTE ESTERASE UR QL STRIP: ABNORMAL
LYMPHOCYTES # BLD AUTO: 2.41 THOUSANDS/ÂΜL (ref 0.6–4.47)
LYMPHOCYTES NFR BLD AUTO: 36 % (ref 14–44)
MAGNESIUM SERPL-MCNC: 2.1 MG/DL (ref 1.9–2.7)
MCH RBC QN AUTO: 30.3 PG (ref 26.8–34.3)
MCHC RBC AUTO-ENTMCNC: 32.1 G/DL (ref 31.4–37.4)
MCV RBC AUTO: 94 FL (ref 82–98)
MONOCYTES # BLD AUTO: 0.55 THOUSAND/ÂΜL (ref 0.17–1.22)
MONOCYTES NFR BLD AUTO: 8 % (ref 4–12)
MUCOUS THREADS UR QL AUTO: ABNORMAL
NEUTROPHILS # BLD AUTO: 3.54 THOUSANDS/ÂΜL (ref 1.85–7.62)
NEUTS SEG NFR BLD AUTO: 53 % (ref 43–75)
NITRITE UR QL STRIP: POSITIVE
NON-SQ EPI CELLS URNS QL MICRO: ABNORMAL /HPF
NRBC BLD AUTO-RTO: 0 /100 WBCS
PH UR STRIP.AUTO: 6 [PH]
PLATELET # BLD AUTO: 243 THOUSANDS/UL (ref 149–390)
PMV BLD AUTO: 11.4 FL (ref 8.9–12.7)
POTASSIUM SERPL-SCNC: 4.1 MMOL/L (ref 3.5–5.3)
PROT SERPL-MCNC: 6.9 G/DL (ref 6.4–8.4)
PROT UR STRIP-MCNC: ABNORMAL MG/DL
RBC # BLD AUTO: 4.29 MILLION/UL (ref 3.81–5.12)
RBC #/AREA URNS AUTO: ABNORMAL /HPF
SODIUM SERPL-SCNC: 138 MMOL/L (ref 135–147)
SP GR UR STRIP.AUTO: 1.02 (ref 1–1.03)
TIBC SERPL-MCNC: 336 UG/DL (ref 250–450)
TRIGL SERPL-MCNC: 98 MG/DL
TSH SERPL DL<=0.05 MIU/L-ACNC: 3.3 UIU/ML (ref 0.45–4.5)
UIBC SERPL-MCNC: 275 UG/DL (ref 155–355)
UROBILINOGEN UR STRIP-ACNC: <2 MG/DL
VIT B12 SERPL-MCNC: 550 PG/ML (ref 180–914)
WBC # BLD AUTO: 6.63 THOUSAND/UL (ref 4.31–10.16)
WBC #/AREA URNS AUTO: ABNORMAL /HPF

## 2024-09-09 PROCEDURE — 87086 URINE CULTURE/COLONY COUNT: CPT

## 2024-09-09 PROCEDURE — 87077 CULTURE AEROBIC IDENTIFY: CPT

## 2024-09-09 PROCEDURE — 87186 SC STD MICRODIL/AGAR DIL: CPT

## 2024-09-09 PROCEDURE — 81001 URINALYSIS AUTO W/SCOPE: CPT

## 2024-09-10 ENCOUNTER — TELEMEDICINE (OUTPATIENT)
Dept: SLEEP CENTER | Facility: CLINIC | Age: 34
End: 2024-09-10
Payer: COMMERCIAL

## 2024-09-10 ENCOUNTER — TELEPHONE (OUTPATIENT)
Dept: FAMILY MEDICINE CLINIC | Facility: CLINIC | Age: 34
End: 2024-09-10

## 2024-09-10 DIAGNOSIS — G47.20 CIRCADIAN RHYTHM SLEEP DISORDER: Primary | ICD-10-CM

## 2024-09-10 DIAGNOSIS — N30.00 ACUTE CYSTITIS WITHOUT HEMATURIA: Primary | ICD-10-CM

## 2024-09-10 DIAGNOSIS — G25.81 RESTLESS LEG SYNDROME: ICD-10-CM

## 2024-09-10 DIAGNOSIS — G47.00 INSOMNIA, UNSPECIFIED TYPE: ICD-10-CM

## 2024-09-10 LAB
EST. AVERAGE GLUCOSE BLD GHB EST-MCNC: 97 MG/DL
HBA1C MFR BLD: 5 %

## 2024-09-10 PROCEDURE — 99213 OFFICE O/P EST LOW 20 MIN: CPT | Performed by: PSYCHIATRY & NEUROLOGY

## 2024-09-10 RX ORDER — CEPHALEXIN 500 MG/1
500 CAPSULE ORAL 2 TIMES DAILY
Qty: 14 CAPSULE | Refills: 0 | Status: SHIPPED | OUTPATIENT
Start: 2024-09-10 | End: 2024-09-17

## 2024-09-10 NOTE — TELEPHONE ENCOUNTER
----- Message from Jonathan Joseph MD sent at 9/10/2024  1:44 PM EDT -----  So does look like you have a UTI I am going to send you over some medicine and organ to treat it and then let me know if you do not get any better

## 2024-09-10 NOTE — PROGRESS NOTES
"Virtual Regular Visit  Name: Samantha Jaramillo      : 1990      MRN: [id number]  Encounter Provider: Ramesh Wallace MD  Encounter Date: 9/10/2024   Encounter department: [facility] [place]    Verification of patient location:    Patient is located at Home in the following state in which I hold an active license [place]    Assessment & Plan  Restless leg syndrome  Encouraged her to have iron levels completed, these were previously ordered.  It seems that restless legs are not the primary component of her sleep disorder but addressing this may be helpful.       Insomnia, unspecified type         Circadian rhythm sleep disorder  Improved since her last visit with me, she has increased sleep and more continuously.  Encouraged regular sunlight on awakening.  We discussed strategies to try to advance the circadian rhythm such as using low-dose melatonin (0.3 mg) as well as light therapy.  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           Encounter provider Ramesh Wallace MD    The patient was identified by name and date of birth. Samantha Jaramillo was informed that this is a telemedicine visit and that the visit is being conducted through the Epic Embedded platform. She agrees to proceed..  My office door was closed. No one else was in the room.  She acknowledged consent and understanding of privacy and security of the video platform. The patient has agreed to participate and understands they can discontinue the visit at any time.    Patient is aware this is a billable service.           Samantha describes that her sleep is much better since her last visit with me.  She notes her sleep has gradually improved and mostly pretty steady since then     She continues to sleep on a \"third shift schedule\"  She is going to bed 7 am   Falls asleep in 20-30 minutes sometimes.  Sometimes longer  Wakes at 2 pm  Sleeping through until 2 pm more often    Presently not " taking quetiapine      Tries to get sunlight on waking if it is nice out    Anxiety- the same, not very high      History of Present Illness     Samantha Jaramillo is a 34 y.o. female     History obtained from : patient  Review of Systems  Pertinent Medical History            Objective     LMP 08/21/2024 (Approximate)   Physical Exam    Visit Time  Total Visit Duration: 20

## 2024-09-10 NOTE — PATIENT INSTRUCTIONS
Thank you for trusting me with your care!    I know we often  cover a lot of information at the visit, so if you have follow-up questions, are unclear about the plan, or feel there were important items that we did not discuss or you did not receive clarity on, please don't hesitate to reach out to me.     I would recommend trying the following to shift your sleep cycle earlier    1) I would recommend starting melatonin 0.3 mg (300 mcg) about 4 hours before your natural bedtime.  This dose is very hard to find, it is not typically sold in stores.  I know [employer] usually sells it    2) light therapy can be very helpful to also reprogram your sleep cycle.  I would avoid all bright lights and screens for approximately 1 hour before bed.  Try to go to bed at the same time each night.  I would then set an alarm approximately 9 hours later and try very hard to get out of bed.  As soon as you get out of bed, I would go outside and obtain half an hour of direct sunlight.  You can read outside, wear sunscreen if you need to.  Every few days, you can start to move your bedtime earlier in 15-minute increments and set the alarm 15 minutes earlier.  Getting direct sunlight every day is extremely important and necessary for this to work.      3) as part of shifting your sleep cycle, it is also important to keep the same schedule 7 days a week, meaning you should sleep in same schedule on weekends as you do on weekdays.      I recommend following this advice in general before any lab test, imaging test, doctor visit, surgery, or ordering CPAP supplies as it is best to understand your coverage to avoid unexpected bills after the fact.       Nursing Support:  When: Monday through Friday 7:30A-4:30PM except holidays  Where: Our direct line is [phone]  *3  *1.      If you are having a true emergency please call 911.  In the event that the line is busy or it is after hours please leave a voice message and we will return your call.   Please speak clearly, leaving your full name, birth date, best number to reach you and the reason for your call.   Medication refills: We will need the name of the medication, the dosage, the ordering provider, whether you get a 30 or 90 day refill, and the pharmacy name and address.  Medications will be ordered by the provider only.  Nurses cannot call in prescriptions.  Please allow 7 days for medication refills.  Physician requested updates: If your provider requested that you call with an update after starting medication, please be ready to provide us the medication and dosage, what time you take your medication, the time you attempt to fall asleep, time you fall asleep, when you wake up, and what time you get out of bed.  Sleep Study Results: We will contact you with sleep study results and/or next steps after the physician has reviewed your testing.

## 2024-09-12 ENCOUNTER — OFFICE VISIT (OUTPATIENT)
Dept: FAMILY MEDICINE CLINIC | Facility: CLINIC | Age: 34
End: 2024-09-12

## 2024-09-12 VITALS
WEIGHT: 127 LBS | OXYGEN SATURATION: 97 % | DIASTOLIC BLOOD PRESSURE: 74 MMHG | HEIGHT: 61 IN | SYSTOLIC BLOOD PRESSURE: 116 MMHG | BODY MASS INDEX: 23.98 KG/M2 | HEART RATE: 77 BPM | RESPIRATION RATE: 16 BRPM

## 2024-09-12 DIAGNOSIS — E78.2 MIXED HYPERLIPIDEMIA: Primary | ICD-10-CM

## 2024-09-12 DIAGNOSIS — E55.9 VITAMIN D DEFICIENCY: ICD-10-CM

## 2024-09-12 DIAGNOSIS — E61.1 IRON DEFICIENCY: ICD-10-CM

## 2024-09-12 LAB — BACTERIA UR CULT: ABNORMAL

## 2024-09-12 NOTE — PROGRESS NOTES
Ambulatory Visit  Name: Samantha Jaramillo      : 1990      MRN: [id number]  Encounter Provider: Jonathan Joseph MD  Encounter Date: 2024   Encounter department: [facility]    Assessment & Plan  Mixed hyperlipidemia    Orders:    Lipid Panel with Direct LDL reflex; Future    Vitamin D deficiency    Orders:    Vitamin D 25 hydroxy; Future    Iron deficiency    Orders:    Iron Panel (Includes Ferritin, Iron Sat%, Iron, and TIBC); Future       Assessment & Plan  1. Iron Deficiency.  His ferritin level is low at 16, indicating iron deficiency, although he is not anemic. A daily dose of iron 325 mg was recommended.    2. Elevated Cholesterol.  His cholesterol levels are not within the desired range. Over-the-counter red yeast rice was recommended for cholesterol management. The DNA [employer] program was suggested for further genetic testing to check for familial high cholesterol.    3. Vitamin D Deficiency.  His vitamin D level is slightly low. A daily supplement of vitamin D 2000 IU was advised.    4. Urinary Tract Infection (UTI).  A urine culture confirmed a UTI. No specific treatment was mentioned for the UTI in the transcript.    5. Sleep Issues.  Quetiapine was suggested for use as needed for sleep issues.    Follow-up  A follow-up visit is scheduled for 2025.        History of Present Illness     History of Present Illness  The patient presents for evaluation of multiple medical concerns.    SHe reports no pain or symptoms suggestive of a urinary tract infection (UTI). SHe has previously been diagnosed with low iron levels. HER  sleep issues have shown some improvement, but She remains frustrated due to the lack of understanding about the cause of HER sleep problems that started a few months ago.    FAMILY HISTORY  HER mother had high cholesterol.     Review of Systems   Constitutional:  Negative for fever and unexpected weight change.   HENT:  Negative for nosebleeds and trouble  "swallowing.    Eyes:  Negative for visual disturbance.   Respiratory:  Negative for chest tightness and shortness of breath.    Cardiovascular:  Negative for chest pain, palpitations and leg swelling.   Gastrointestinal:  Negative for abdominal pain, constipation, diarrhea and nausea.   Endocrine: Negative for cold intolerance.   Genitourinary:  Negative for dysuria and urgency.   Musculoskeletal:  Negative for joint swelling and myalgias.   Skin:  Negative for rash.   Neurological:  Negative for tremors, seizures and syncope.   Hematological:  Does not bruise/bleed easily.   Psychiatric/Behavioral:  Negative for hallucinations and suicidal ideas.      Objective     /74 (BP Location: Right arm, Patient Position: Sitting, Cuff Size: Standard)   Pulse 77   Resp 16   Ht 5' 1\" (1.549 m)   Wt 57.6 kg (127 lb)   LMP 08/21/2024 (Approximate)   SpO2 97%   BMI 24.00 kg/m²     Physical Exam    Physical Exam  Vitals and nursing note reviewed.   Constitutional:       Appearance: She is well-developed.   HENT:      Head: Normocephalic and atraumatic.      Right Ear: External ear normal.      Left Ear: External ear normal.      Nose: Nose normal.   Eyes:      Conjunctiva/sclera: Conjunctivae normal.      Pupils: Pupils are equal, round, and reactive to light.   Cardiovascular:      Rate and Rhythm: Normal rate and regular rhythm.      Heart sounds: Normal heart sounds. No murmur heard.  Pulmonary:      Effort: Pulmonary effort is normal.      Breath sounds: Normal breath sounds. No wheezing.   Abdominal:      General: Bowel sounds are normal.      Palpations: Abdomen is soft.   Musculoskeletal:         General: No tenderness. Normal range of motion.      Cervical back: Normal range of motion and neck supple.   Lymphadenopathy:      Cervical: No cervical adenopathy.   Skin:     General: Skin is warm and dry.      Capillary Refill: Capillary refill takes less than 2 seconds.   Neurological:      Mental Status: She is " alert and oriented to person, place, and time.   Psychiatric:         Behavior: Behavior normal.         Thought Content: Thought content normal.         Judgment: Judgment normal.

## 2024-09-15 DIAGNOSIS — Z00.6 ENCOUNTER FOR EXAMINATION FOR NORMAL COMPARISON OR CONTROL IN CLINICAL RESEARCH PROGRAM: ICD-10-CM

## 2024-12-28 ENCOUNTER — HOSPITAL ENCOUNTER (EMERGENCY)
Facility: HOSPITAL | Age: 34
Discharge: HOME | End: 2024-12-28
Attending: EMERGENCY MEDICINE | Admitting: EMERGENCY MEDICINE
Payer: COMMERCIAL

## 2024-12-28 VITALS
HEART RATE: 104 BPM | SYSTOLIC BLOOD PRESSURE: 115 MMHG | TEMPERATURE: 99.2 F | OXYGEN SATURATION: 99 % | DIASTOLIC BLOOD PRESSURE: 78 MMHG | RESPIRATION RATE: 18 BRPM

## 2024-12-28 DIAGNOSIS — B34.9 VIRAL SYNDROME: ICD-10-CM

## 2024-12-28 DIAGNOSIS — R00.2 PALPITATIONS: Primary | ICD-10-CM

## 2024-12-28 LAB
ANION GAP SERPL CALCULATED.3IONS-SCNC: 10 MMOL/L (ref 4–13)
ATRIAL RATE: 120 BPM
BASOPHILS # BLD AUTO: 0.02 THOUSANDS/ÂΜL (ref 0–0.1)
BASOPHILS NFR BLD AUTO: 0 % (ref 0–1)
BUN SERPL-MCNC: 9 MG/DL (ref 5–25)
CALCIUM SERPL-MCNC: 10.1 MG/DL (ref 8.4–10.2)
CARDIAC TROPONIN I PNL SERPL HS: <2 NG/L (ref ?–50)
CHLORIDE SERPL-SCNC: 101 MMOL/L (ref 96–108)
CO2 SERPL-SCNC: 28 MMOL/L (ref 21–32)
CREAT SERPL-MCNC: 0.68 MG/DL (ref 0.6–1.3)
EOSINOPHIL # BLD AUTO: 0 THOUSAND/ÂΜL (ref 0–0.61)
EOSINOPHIL NFR BLD AUTO: 0 % (ref 0–6)
ERYTHROCYTE [DISTWIDTH] IN BLOOD BY AUTOMATED COUNT: 12 % (ref 11.6–15.1)
EXT PREGNANCY TEST URINE: NEGATIVE
EXT. CONTROL: NORMAL
FLUAV AG UPPER RESP QL IA.RAPID: NEGATIVE
FLUBV AG UPPER RESP QL IA.RAPID: NEGATIVE
GAS + CO PNL BLDA: 1.7 % (ref 0–1.5)
GFR SERPL CREATININE-BSD FRML MDRD: 115 ML/MIN/1.73SQ M
GLUCOSE SERPL-MCNC: 105 MG/DL (ref 65–140)
HCT VFR BLD AUTO: 40.5 % (ref 34.8–46.1)
HGB BLD-MCNC: 13.2 G/DL (ref 11.5–15.4)
IMM GRANULOCYTES # BLD AUTO: 0.02 THOUSAND/UL (ref 0–0.2)
IMM GRANULOCYTES NFR BLD AUTO: 0 % (ref 0–2)
LYMPHOCYTES # BLD AUTO: 0.52 THOUSANDS/ÂΜL (ref 0.6–4.47)
LYMPHOCYTES NFR BLD AUTO: 10 % (ref 14–44)
MCH RBC QN AUTO: 30.4 PG (ref 26.8–34.3)
MCHC RBC AUTO-ENTMCNC: 32.6 G/DL (ref 31.4–37.4)
MCV RBC AUTO: 93 FL (ref 82–98)
MONOCYTES # BLD AUTO: 0.46 THOUSAND/ÂΜL (ref 0.17–1.22)
MONOCYTES NFR BLD AUTO: 9 % (ref 4–12)
NEUTROPHILS # BLD AUTO: 4.35 THOUSANDS/ÂΜL (ref 1.85–7.62)
NEUTS SEG NFR BLD AUTO: 81 % (ref 43–75)
NRBC BLD AUTO-RTO: 0 /100 WBCS
P AXIS: 65 DEGREES
PLATELET # BLD AUTO: 258 THOUSANDS/UL (ref 149–390)
PMV BLD AUTO: 10.2 FL (ref 8.9–12.7)
POTASSIUM SERPL-SCNC: 3.8 MMOL/L (ref 3.5–5.3)
PR INTERVAL: 124 MS
QRS AXIS: 56 DEGREES
QRSD INTERVAL: 78 MS
QT INTERVAL: 320 MS
QTC INTERVAL: 452 MS
RBC # BLD AUTO: 4.34 MILLION/UL (ref 3.81–5.12)
SARS-COV+SARS-COV-2 AG RESP QL IA.RAPID: NEGATIVE
SODIUM SERPL-SCNC: 139 MMOL/L (ref 135–147)
T WAVE AXIS: 44 DEGREES
TSH SERPL DL<=0.05 MIU/L-ACNC: 1.98 UIU/ML (ref 0.45–4.5)
VENTRICULAR RATE: 120 BPM
WBC # BLD AUTO: 5.37 THOUSAND/UL (ref 4.31–10.16)

## 2024-12-28 PROCEDURE — 96361 HYDRATE IV INFUSION ADD-ON: CPT

## 2024-12-28 PROCEDURE — 84484 ASSAY OF TROPONIN QUANT: CPT | Performed by: EMERGENCY MEDICINE

## 2024-12-28 PROCEDURE — 99284 EMERGENCY DEPT VISIT MOD MDM: CPT

## 2024-12-28 PROCEDURE — 36415 COLL VENOUS BLD VENIPUNCTURE: CPT | Performed by: EMERGENCY MEDICINE

## 2024-12-28 PROCEDURE — 99284 EMERGENCY DEPT VISIT MOD MDM: CPT | Performed by: EMERGENCY MEDICINE

## 2024-12-28 PROCEDURE — 85025 COMPLETE CBC W/AUTO DIFF WBC: CPT | Performed by: EMERGENCY MEDICINE

## 2024-12-28 PROCEDURE — 80048 BASIC METABOLIC PNL TOTAL CA: CPT | Performed by: EMERGENCY MEDICINE

## 2024-12-28 PROCEDURE — 96374 THER/PROPH/DIAG INJ IV PUSH: CPT

## 2024-12-28 PROCEDURE — 87804 INFLUENZA ASSAY W/OPTIC: CPT | Performed by: EMERGENCY MEDICINE

## 2024-12-28 PROCEDURE — 93005 ELECTROCARDIOGRAM TRACING: CPT

## 2024-12-28 PROCEDURE — 81025 URINE PREGNANCY TEST: CPT | Performed by: EMERGENCY MEDICINE

## 2024-12-28 PROCEDURE — 82375 ASSAY CARBOXYHB QUANT: CPT | Performed by: EMERGENCY MEDICINE

## 2024-12-28 PROCEDURE — 87811 SARS-COV-2 COVID19 W/OPTIC: CPT | Performed by: EMERGENCY MEDICINE

## 2024-12-28 PROCEDURE — 84443 ASSAY THYROID STIM HORMONE: CPT | Performed by: EMERGENCY MEDICINE

## 2024-12-28 RX ORDER — KETOROLAC TROMETHAMINE 30 MG/ML
15 INJECTION, SOLUTION INTRAMUSCULAR; INTRAVENOUS ONCE
Status: COMPLETED | OUTPATIENT
Start: 2024-12-28 | End: 2024-12-28

## 2024-12-28 RX ADMIN — SODIUM CHLORIDE 1000 ML: 0.9 INJECTION, SOLUTION INTRAVENOUS at 15:47

## 2024-12-28 RX ADMIN — SODIUM CHLORIDE 1000 ML: 0.9 INJECTION, SOLUTION INTRAVENOUS at 14:45

## 2024-12-28 RX ADMIN — KETOROLAC TROMETHAMINE 15 MG: 30 INJECTION, SOLUTION INTRAMUSCULAR at 15:45

## 2024-12-29 NOTE — ED PROVIDER NOTES
Time reflects when diagnosis was documented in both MDM as applicable and the Disposition within this note       Time User Action Codes Description Comment    12/28/2024  4:44 PM Bennie Izquierdo [R00.2] Palpitations     12/28/2024  4:44 PM Bennie Izquierdo [B34.9] Viral syndrome           ED Disposition       ED Disposition   Discharge    Condition   Stable    Date/Time   Sat Dec 28, 2024  4:44 PM    Comment   Samantha Jaramillo discharge to home/self care.                   Assessment & Plan       Medical Decision Making  Tachycardia: Hyperthyroidism ruled out, in light of patient having obvious flulike symptoms.  MI ruled out with troponin in light of timing of symptoms.  Heart rate dramatically improved with IV hydration.  URI: Suspect COVID despite negative testing as father had close contact with someone with COVID and has same symptoms.  Doubt pneumonia as patient has only had symptoms for less than 24 hours.    Amount and/or Complexity of Data Reviewed  Labs: ordered. Decision-making details documented in ED Course.    Risk  Prescription drug management.        ED Course as of 12/28/24 2044   Sat Dec 28, 2024   1502 Carbon Monoxide, Blood(!): 1.7  Not at level to raise suspicions for CO poisoning.  Neverthless, I will emphasize to patient need for CO detector in her house.   1530 On reassessment, repeat heart rate improved to 103.  Patient has received 1 of 2 liters so far.  IV is positional, I helped reposition patient to facilitate receiving remainder of fluids.       Medications   sodium chloride 0.9 % bolus 1,000 mL (0 mL Intravenous Stopped 12/28/24 1608)   ketorolac (TORADOL) injection 15 mg (15 mg Intravenous Given 12/28/24 1545)   sodium chloride 0.9 % bolus 1,000 mL (0 mL Intravenous Stopped 12/28/24 1645)       ED Risk Strat Scores                          SBIRT 22yo+      Flowsheet Row Most Recent Value   Initial Alcohol Screen: US AUDIT-C     1. How often do you have a drink containing alcohol?  0 Filed at: 12/28/2024 1521   2. How many drinks containing alcohol do you have on a typical day you are drinking?  0 Filed at: 12/28/2024 1521   3b. FEMALE Any Age, or MALE 65+: How often do you have 4 or more drinks on one occassion? 0 Filed at: 12/28/2024 1521   Audit-C Score 0 Filed at: 12/28/2024 1521   CAMMY: How many times in the past year have you...    Used an illegal drug or used a prescription medication for non-medical reasons? Never Filed at: 12/28/2024 1521                            History of Present Illness       Chief Complaint   Patient presents with    Palpitations     Pt reports feeling palpitations for the past several hours, states at home monitor has been reading HR over 100 even when lying down       Past Medical History:   Diagnosis Date    Iron deficiency       Past Surgical History:   Procedure Laterality Date    VT LAPAROSCOPY W/RMVL ADNEXAL STRUCTURES Bilateral 9/1/2020    Procedure: LAPAROSCOPIC SALPINGECTOMY;  Surgeon: Jodi Moon DO;  Location: [facility];  Service: Gynecology    TUBAL LIGATION      WISDOM TOOTH EXTRACTION        Family History   Problem Relation Age of Onset    Hypertension Mother     Endometrial cancer Mother     Hyperlipidemia Mother     Stroke Mother     Hypertension Father     Autism Brother     Other Paternal Grandmother         lymes    No Known Problems Paternal Grandfather     No Known Problems Half-Brother     No Known Problems Half-Brother       Social History     Tobacco Use    Smoking status: Never    Smokeless tobacco: Never   Vaping Use    Vaping status: Never Used   Substance Use Topics    Alcohol use: Yes     Comment: occasionally     Drug use: Never      E-Cigarette/Vaping    E-Cigarette Use Never User       E-Cigarette/Vaping Substances    Nicotine No     THC No     CBD No     Flavoring No     Other No     Unknown No       I have reviewed and agree with the history as documented.     Patient reports that last night she felt achy, like she  "had fevers, with sore throat, headache, cough and congestion.  She notes her mother and father had the same symptoms, although her father is also having diarrhea which she has not yet had.  She does note some \"gurgling \"in her abdomen.  Her father just had close contact with someone with confirmed COVID.  She notes that this morning in addition to feeling those symptoms, she also felt palpitations like her heart was beating quickly.  She noted resting heart rate of 125 even when lying in bed.  She denies any abdominal pain.  She reports she initially had no chest pain with this but did have intermittent left chest pain with cough after a few hours.  She first noticed the symptoms when she woke up very early in the morning.      Palpitations      Review of Systems   All other systems reviewed and are negative.          Objective       ED Triage Vitals   Temperature Pulse Blood Pressure Respirations SpO2 Patient Position - Orthostatic VS   12/28/24 1426 12/28/24 1426 12/28/24 1426 12/28/24 1426 12/28/24 1426 12/28/24 1530   99.2 °F (37.3 °C) (!) 123 126/77 16 99 % Lying      Temp Source Heart Rate Source BP Location FiO2 (%) Pain Score    12/28/24 1426 12/28/24 1530 12/28/24 1530 -- --    Oral Monitor Right arm        Vitals      Date and Time Temp Pulse SpO2 Resp BP Pain Score FACES Pain Rating User   12/28/24 1630 -- 104 99 % 18 115/78 -- -- DG   12/28/24 1600 -- 99 99 % 18 112/65 -- -- DG   12/28/24 1530 -- 102 100 % 16 109/72 -- -- DG   12/28/24 1426 99.2 °F (37.3 °C) 123 99 % 16 126/77 -- -- BG            Physical Exam  Vitals and nursing note reviewed.   Constitutional:       General: She is not in acute distress.     Appearance: She is well-developed.   HENT:      Head: Normocephalic and atraumatic.      Mouth/Throat:      Pharynx: Posterior oropharyngeal erythema present.   Eyes:      Conjunctiva/sclera: Conjunctivae normal.   Cardiovascular:      Rate and Rhythm: Regular rhythm. Tachycardia present.      " Heart sounds: No murmur heard.  Pulmonary:      Effort: Pulmonary effort is normal. No respiratory distress.      Breath sounds: Normal breath sounds.   Abdominal:      Palpations: Abdomen is soft.      Tenderness: There is no abdominal tenderness.   Musculoskeletal:         General: No swelling.      Cervical back: Neck supple.   Skin:     General: Skin is warm and dry.      Capillary Refill: Capillary refill takes less than 2 seconds.   Neurological:      Mental Status: She is alert.   Psychiatric:         Mood and Affect: Mood normal.         Results Reviewed       Procedure Component Value Units Date/Time    TSH, 3rd generation with Free T4 reflex [[id number]]  (Normal) Collected: 12/28/24 1444    Lab Status: Final result Specimen: Blood from Arm, Left Updated: 12/28/24 1521     TSH 3RD GENERATON 1.977 uIU/mL     HS Troponin 0hr (reflex protocol) [[id number]]  (Normal) Collected: 12/28/24 1444    Lab Status: Final result Specimen: Blood from Arm, Left Updated: 12/28/24 1512     hs TnI 0hr <2 ng/L     FLU/COVID Rapid Antigen (30 min. TAT) - Preferred screening test in ED [[id number]]  (Normal) Collected: 12/28/24 1444    Lab Status: Final result Specimen: Nares from Nose Updated: 12/28/24 1507     SARS COV Rapid Antigen Negative     Influenza A Rapid Antigen Negative     Influenza B Rapid Antigen Negative    Narrative:      This test has been performed using the Quidel Malena 2 FLU+SARS Antigen test under the Emergency Use Authorization (EUA). This test has been validated by the  and verified by the performing laboratory. The Malena uses lateral flow immunofluorescent sandwich assay to detect SARS-COV, Influenza A and Influenza B Antigen.     The Quidel Malena 2 SARS Antigen test does not differentiate between SARS-CoV and SARS-CoV-2.     Negative results are presumptive and may be confirmed with a molecular assay, if necessary, for patient management. Negative results do not rule out SARS-CoV-2 or  influenza infection and should not be used as the sole basis for treatment or patient management decisions. A negative test result may occur if the level of antigen in a sample is below the limit of detection of this test.     Positive results are indicative of the presence of viral antigens, but do not rule out bacterial infection or co-infection with other viruses.     All test results should be used as an adjunct to clinical observations and other information available to the provider.    FOR PEDIATRIC PATIENTS - copy/paste COVID Guidelines URL to browser: [email or web address]    Basic metabolic panel [[id number]] Collected: 12/28/24 1444    Lab Status: Final result Specimen: Blood from Arm, Left Updated: 12/28/24 1505     Sodium 139 mmol/L      Potassium 3.8 mmol/L      Chloride 101 mmol/L      CO2 28 mmol/L      ANION GAP 10 mmol/L      BUN 9 mg/dL      Creatinine 0.68 mg/dL      Glucose 105 mg/dL      Calcium 10.1 mg/dL      eGFR 115 ml/min/1.73sq m     Narrative:      National Kidney Disease Foundation guidelines for Chronic Kidney Disease (CKD):     Stage 1 with normal or high GFR (GFR > 90 mL/min/1.73 square meters)    Stage 2 Mild CKD (GFR = 60-89 mL/min/1.73 square meters)    Stage 3A Moderate CKD (GFR = 45-59 mL/min/1.73 square meters)    Stage 3B Moderate CKD (GFR = 30-44 mL/min/1.73 square meters)    Stage 4 Severe CKD (GFR = 15-29 mL/min/1.73 square meters)    Stage 5 End Stage CKD (GFR <15 mL/min/1.73 square meters)  Note: GFR calculation is accurate only with a steady state creatinine    POCT pregnancy, urine [[id number]]  (Normal) Collected: 12/28/24 1502    Lab Status: Final result Updated: 12/28/24 1502     EXT Preg Test, Ur Negative     Control Valid    Carboxyhemoglobin [[id number]]  (Abnormal) Collected: 12/28/24 1452    Lab Status: Final result Specimen: Blood from Arm, Left Updated: 12/28/24 1459     Carbon Monoxide, Blood 1.7 %      Narrative:      Therapeutic levels (1 mg/mL and 2 mg/mL) of hydroxocobalamin may interfere with the fCOHb and fMetHb where it may cause lower than expected values  Normal Carboxyhemoglobin range for nonsmokers is <1.5%   Normal Carboxyhemoglobin range for smokers is 1.5% to 5.1%     CBC and differential [[id number]]  (Abnormal) Collected: 12/28/24 1444    Lab Status: Final result Specimen: Blood from Arm, Left Updated: 12/28/24 1451     WBC 5.37 Thousand/uL      RBC 4.34 Million/uL      Hemoglobin 13.2 g/dL      Hematocrit 40.5 %      MCV 93 fL      MCH 30.4 pg      MCHC 32.6 g/dL      RDW 12.0 %      MPV 10.2 fL      Platelets 258 Thousands/uL      nRBC 0 /100 WBCs      Segmented % 81 %      Immature Grans % 0 %      Lymphocytes % 10 %      Monocytes % 9 %      Eosinophils Relative 0 %      Basophils Relative 0 %      Absolute Neutrophils 4.35 Thousands/µL      Absolute Immature Grans 0.02 Thousand/uL      Absolute Lymphocytes 0.52 Thousands/µL      Absolute Monocytes 0.46 Thousand/µL      Eosinophils Absolute 0.00 Thousand/µL      Basophils Absolute 0.02 Thousands/µL             No orders to display       Procedures    ED Medication and Procedure Management   Prior to Admission Medications   Prescriptions Last Dose Informant Patient Reported? Taking?   ferrous sulfate 324 (65 Fe) mg  Self No No   Sig: TAKE 1 TABLET (324 MG TOTAL) BY MOUTH 2 (TWO) TIMES A DAY BEFORE MEALS      Facility-Administered Medications: None     Discharge Medication List as of 12/28/2024  4:45 PM        CONTINUE these medications which have NOT CHANGED    Details   ferrous sulfate 324 (65 Fe) mg TAKE 1 TABLET (324 MG TOTAL) BY MOUTH 2 (TWO) TIMES A DAY BEFORE MEALS, Starting Tue 6/15/2021, Normal           No discharge procedures on file.  ED SEPSIS DOCUMENTATION   Time reflects when diagnosis was documented in both MDM as applicable and the Disposition within this note       Time User Action Codes Description Comment    12/28/2024  4:44 PM  Orloski, Bennie Add [R00.2] Palpitations     12/28/2024  4:44 PM Bennie Izquierdo [B34.9] Viral syndrome                  Bennie Izquierdo MD  12/28/24 2045

## 2024-12-30 LAB
ATRIAL RATE: 120 BPM
P AXIS: 65 DEGREES
PR INTERVAL: 124 MS
QRS AXIS: 56 DEGREES
QRSD INTERVAL: 78 MS
QT INTERVAL: 320 MS
QTC INTERVAL: 452 MS
T WAVE AXIS: 44 DEGREES
VENTRICULAR RATE: 120 BPM

## 2024-12-30 PROCEDURE — 93010 ELECTROCARDIOGRAM REPORT: CPT | Performed by: INTERNAL MEDICINE

## 2024-12-31 ENCOUNTER — TELEPHONE (OUTPATIENT)
Dept: SLEEP CENTER | Facility: CLINIC | Age: 34
End: 2024-12-31

## 2025-01-02 ENCOUNTER — OFFICE VISIT (OUTPATIENT)
Dept: FAMILY MEDICINE CLINIC | Facility: CLINIC | Age: 35
End: 2025-01-02
Payer: COMMERCIAL

## 2025-01-02 VITALS
DIASTOLIC BLOOD PRESSURE: 78 MMHG | WEIGHT: 124 LBS | OXYGEN SATURATION: 98 % | HEIGHT: 61 IN | SYSTOLIC BLOOD PRESSURE: 116 MMHG | BODY MASS INDEX: 23.41 KG/M2 | RESPIRATION RATE: 16 BRPM | HEART RATE: 88 BPM

## 2025-01-02 DIAGNOSIS — G47.00 INSOMNIA, UNSPECIFIED TYPE: Primary | ICD-10-CM

## 2025-01-02 PROCEDURE — 99213 OFFICE O/P EST LOW 20 MIN: CPT | Performed by: FAMILY MEDICINE

## 2025-01-02 RX ORDER — QUETIAPINE FUMARATE 50 MG/1
50 TABLET, FILM COATED ORAL
Qty: 30 TABLET | Refills: 0 | Status: SHIPPED | OUTPATIENT
Start: 2025-01-02 | End: 2025-01-04

## 2025-01-02 NOTE — ASSESSMENT & PLAN NOTE
Orders:    QUEtiapine (SEROquel) 50 mg tablet; Take 1 tablet (50 mg total) by mouth daily at bedtime

## 2025-01-02 NOTE — TELEPHONE ENCOUNTER
Call placed to patient, left message to complete sleep diary for a minimum of one week and appointment has been scheduled with Dr. Wallace 1/15/2025.  Provided telephone number [phone],option 1, then option 3 to reschedule, if needed.    [facility] message sent with sleep diary attached and above information included.

## 2025-01-02 NOTE — PROGRESS NOTES
Name: Samantha Jaramillo      : 1990      MRN: [id number]  Encounter Provider: Jonathan Joseph MD  Encounter Date: 2025   Encounter department: [facility]    Assessment & Plan  Insomnia, unspecified type    Orders:  •  QUEtiapine (SEROquel) 50 mg tablet; Take 1 tablet (50 mg total) by mouth daily at bedtime      Assessment & Plan  1. Circadian rhythm sleep disorder.  Her circadian rhythm sleep disorder has shown improvement since the last consultation with Dr. Rmaesh Wallace. She was previously prescribed quetiapine, which she found beneficial but misplaced recently. She is advised to maintain a consistent sleep schedule, ensuring she goes to bed at 11:00 PM and wakes up at 7:00 AM daily, including weekends. She is encouraged to establish a calming bedtime routine, which may include activities such as taking a shower, drinking warm water with honey or lemon, using a sleep mask, or installing blackout curtains. The use of an alarm clock instead of a phone for waking up is recommended. She is also advised to avoid napping during the day and to expose herself to sunlight upon waking. A prescription for quetiapine 50 mg has been provided, to be taken nightly at 10:30 PM for a minimum of 10 days.    2. Recent illness.  She reported being sick over the last week, with symptoms including throat congestion and a persistent cough. She mentioned that the symptoms have been improving over the last couple of days. She is advised to continue using cough drops and to perform salt water gargles. Throat lozenges are also recommended to alleviate throat discomfort.       History of Present Illness     History of Present Illness  The patient presents for evaluation of sleep issues.    She has been experiencing sleep disturbances, which were previously discussed with her sleep medicine specialist, Dr. Ramesh Wallace. A few months ago, she was advised to complete her iron levels as restless legs are not the primary  "component of her sleep disorder but addressing this may be helpful also. Her circadian rhythm sleep disorder has improved since her last visit. She has increased sleep and was encouraged regular sunlight on awakening. They discussed strategies to try to advance the circadian rhythm such as using low dose melatonin 0.3 mg. She was also advised to fill out the sleep log and obtain direct sunlight immediately upon awakening for about half an hour on consistent basis. Regarding hypnotics, she was prescribed quetiapine. She could try this medication, but it would not be an ideal long-term treatment. She does not need a sleep study. Although there were a few minor setbacks, her condition improved overall. However, over the past week, her sleep quality has significantly deteriorated, with only a few hours of sleep achieved collectively. She has been sick this last week, so she doubts that helps. She has misplaced her quetiapine medication and is currently unemployed, which she believes may be contributing to her sleep issues. Despite attempts to nap during the day, she has been unsuccessful. She reports feeling extremely fatigued and has been unable to engage in any activities. Over the past week, there was an instance where she went without sleep for over 2 days. Yesterday, she managed to sleep for 2 to 3 hours in the morning but remained awake for the majority of the day and night. She also reports recent illness in her household, including herself, which she suspects may be COVID-19, although she is unsure. She has been experiencing throat congestion, which has shown some improvement over the past 2 days. She continues to have a persistent cough and has been using cough drops for relief.    SOCIAL HISTORY  - Currently unemployed    MEDICATIONS  - Discontinued: quetiapine     Review of Systems  Objective   /78   Pulse 88   Resp 16   Ht 5' 1\" (1.549 m)   Wt 56.2 kg (124 lb)   SpO2 98%   BMI 23.43 kg/m² "     Physical Exam  ROS:  Constitutional:  Negative for activity change, appetite change and fever.   HENT:  Negative for congestion, nosebleeds and trouble swallowing.    Eyes:  Negative for itching.   Respiratory:  Negative for cough and chest tightness.    Cardiovascular:  Negative for chest pain and palpitations.   Gastrointestinal:  Negative for abdominal pain, constipation, diarrhea and nausea.   Endocrine: Negative for cold intolerance.   Genitourinary:  Negative for frequency.   Musculoskeletal:  Negative for gait problem and joint swelling.   Skin:  Negative for rash.   Allergic/Immunologic: Negative for immunocompromised state.   Neurological:  Negative for dizziness, tremors, seizures, syncope and headaches.   Psychiatric/Behavioral:  Negative for hallucinations and suicidal ideas.      General Appearance: is well-developed, NAD.  Vital signs: Within normal limits.  HEENT: Throat is erythematous.  Respiratory: Lungs were auscultated.  Cardiovascular: +S1 S2 RRR no murmur.  Gastrointestinal: Abdomen soft NT ND + bowel sounds.  Skin: Warm and dry, no rash.  Neurological: Normal.  Psychiatric: wnl.  Physical Exam

## 2025-01-04 DIAGNOSIS — G47.00 INSOMNIA, UNSPECIFIED TYPE: Primary | ICD-10-CM

## 2025-01-04 RX ORDER — TRAZODONE HYDROCHLORIDE 50 MG/1
TABLET, FILM COATED ORAL
Qty: 30 TABLET | Refills: 0 | Status: SHIPPED | OUTPATIENT
Start: 2025-01-04

## 2025-01-12 DIAGNOSIS — G47.00 INSOMNIA, UNSPECIFIED TYPE: ICD-10-CM

## 2025-01-13 RX ORDER — TRAZODONE HYDROCHLORIDE 50 MG/1
TABLET, FILM COATED ORAL
Qty: 180 TABLET | Refills: 1 | Status: SHIPPED | OUTPATIENT
Start: 2025-01-13

## 2025-01-15 ENCOUNTER — OFFICE VISIT (OUTPATIENT)
Dept: FAMILY MEDICINE CLINIC | Facility: CLINIC | Age: 35
End: 2025-01-15
Payer: COMMERCIAL

## 2025-01-15 ENCOUNTER — TELEMEDICINE (OUTPATIENT)
Dept: SLEEP CENTER | Facility: CLINIC | Age: 35
End: 2025-01-15
Payer: COMMERCIAL

## 2025-01-15 VITALS
DIASTOLIC BLOOD PRESSURE: 78 MMHG | HEART RATE: 74 BPM | HEIGHT: 61 IN | RESPIRATION RATE: 16 BRPM | OXYGEN SATURATION: 99 % | WEIGHT: 124 LBS | SYSTOLIC BLOOD PRESSURE: 124 MMHG | BODY MASS INDEX: 23.41 KG/M2

## 2025-01-15 DIAGNOSIS — D50.9 IRON DEFICIENCY ANEMIA, UNSPECIFIED IRON DEFICIENCY ANEMIA TYPE: Primary | ICD-10-CM

## 2025-01-15 DIAGNOSIS — G47.00 INSOMNIA, UNSPECIFIED TYPE: ICD-10-CM

## 2025-01-15 DIAGNOSIS — D50.8 OTHER IRON DEFICIENCY ANEMIA: ICD-10-CM

## 2025-01-15 DIAGNOSIS — G47.20 CIRCADIAN RHYTHM SLEEP DISORDER: Primary | ICD-10-CM

## 2025-01-15 PROCEDURE — 99213 OFFICE O/P EST LOW 20 MIN: CPT | Performed by: PSYCHIATRY & NEUROLOGY

## 2025-01-15 PROCEDURE — 99213 OFFICE O/P EST LOW 20 MIN: CPT | Performed by: FAMILY MEDICINE

## 2025-01-15 NOTE — PROGRESS NOTES
Name: Samantha Jaramillo      : 1990      MRN: [id number]  Encounter Provider: Jonathan Joseph MD  Encounter Date: 1/15/2025   Encounter department: [facility]    Assessment & Plan  Iron deficiency anemia, unspecified iron deficiency anemia type         Insomnia, unspecified type           Assessment & Plan  1. Sleep disturbances.  She reports improvement in sleep quality with trazodone, taking one tablet nightly. She has been advised to avoid night shifts to maintain her sleep pattern. She will continue trazodone for 6 months to ensure a consistent sleep pattern. A fasting blood test has been ordered to monitor cholesterol, vitamin D, and iron levels.    2. Palpitations.  She experiences occasional palpitations, which have been evaluated multiple times with no cardiac issues identified. She is advised to report any worsening of symptoms.    3. Heavy menstrual bleeding.  She is taking iron supplements to manage mild leg cramps and heavy menstrual periods. She will continue with the current iron supplementation.    Follow-up  The patient will follow up in 6 months.       History of Present Illness     History of Present Illness  The patient presents for evaluation of sleep issues, palpitations, and heavy menstrual bleeding.    She has been experiencing sleep disturbances, which have improved with the use of trazodone. Initially, she required 2 to 3 tablets to achieve sleep, but currently, a single tablet is sufficient. Despite not achieving a full night's sleep, she reports a significant improvement in her sleep quality compared to the previous week. Her sleep duration has been gradually increasing since starting trazodone. She typically takes trazodone between 9:00 PM and 10:00 PM, goes to bed around the same time, and wakes up between 4:00 AM and 6:00 AM. She does not take naps during the day. She had a follow-up appointment with her sleep specialist this morning, who advised her to continue  trazodone as long as it is beneficial. If she establishes a good sleep routine, she may consider reducing the dose to half and gradually discontinuing it to assess her sleep quality without the medication. She has another follow-up scheduled with her sleep specialist in 6 months. She has tried quetiapine once but found it ineffective.    She occasionally experiences palpitations, a symptom she has been dealing with intermittently. She has undergone several cardiac evaluations, all of which have yielded normal results. Her most recent evaluation was conducted towards the end of December 2024. During this visit, she presented to the emergency room with a resting heart rate slightly exceeding 100. An EKG and blood work were performed, both of which returned normal results. The medical team attributed her elevated heart rate to her body's response to illness, as she was suspected to have contracted COVID-19 at the time, although the test results were inconclusive. She also had a fever during this episode. Her heart rate normalized after a day or so, once the fever subsided. She occasionally perceives her heartbeat when lying down, but otherwise, her symptoms are not severe, although they can be bothersome at times.    She is currently taking iron supplements due to heavy menstrual bleeding. She reports that her leg cramps have improved.    SOCIAL HISTORY  - Currently unemployed    MEDICATIONS  - Current: Trazodone  - Current: Iron  - Past: Quetiapine     Review of Systems   Constitutional:  Negative for fever and unexpected weight change.   HENT:  Negative for nosebleeds and trouble swallowing.    Eyes:  Negative for visual disturbance.   Respiratory:  Negative for chest tightness and shortness of breath.    Cardiovascular:  Negative for chest pain, palpitations and leg swelling.   Gastrointestinal:  Negative for abdominal pain, constipation, diarrhea and nausea.   Endocrine: Negative for cold intolerance.  "  Genitourinary:  Negative for dysuria and urgency.   Musculoskeletal:  Negative for joint swelling and myalgias.   Skin:  Negative for rash.   Neurological:  Negative for tremors, seizures and syncope.   Hematological:  Does not bruise/bleed easily.   Psychiatric/Behavioral:  Negative for hallucinations and suicidal ideas.      Objective   /78   Pulse 74   Resp 16   Ht 5' 1\" (1.549 m)   Wt 56.2 kg (124 lb)   SpO2 99%   BMI 23.43 kg/m²     Physical Exam    Physical Exam  Vitals and nursing note reviewed.   Constitutional:       Appearance: She is well-developed.   HENT:      Head: Normocephalic and atraumatic.      Right Ear: External ear normal.      Left Ear: External ear normal.      Nose: Nose normal.   Eyes:      Conjunctiva/sclera: Conjunctivae normal.      Pupils: Pupils are equal, round, and reactive to light.   Cardiovascular:      Rate and Rhythm: Normal rate and regular rhythm.      Heart sounds: Normal heart sounds. No murmur heard.  Pulmonary:      Effort: Pulmonary effort is normal.      Breath sounds: Normal breath sounds. No wheezing.   Abdominal:      General: Bowel sounds are normal.      Palpations: Abdomen is soft.   Musculoskeletal:         General: No tenderness. Normal range of motion.      Cervical back: Normal range of motion and neck supple.   Lymphadenopathy:      Cervical: No cervical adenopathy.   Skin:     General: Skin is warm and dry.      Capillary Refill: Capillary refill takes less than 2 seconds.   Neurological:      Mental Status: She is alert and oriented to person, place, and time.   Psychiatric:         Behavior: Behavior normal.         Thought Content: Thought content normal.         Judgment: Judgment normal.       "

## 2025-01-15 NOTE — ASSESSMENT & PLAN NOTE
Has had restless legs in the past, on oral iron managed by her PCP, as restless legs is not active will defer management to them.

## 2025-01-15 NOTE — PROGRESS NOTES
Virtual Regular Visit  Name: Samantha Jaramillo      : 1990      MRN: [id number]  Encounter Provider: Ramesh Wallace MD  Encounter Date: 1/15/2025   Encounter department: [facility]IC[facility] [place]      Verification of patient location:  Patient is located at Home in the following state in which I hold an active license [place] :  Assessment & Plan  Circadian rhythm sleep disorder  -She had a marked delay in her circadian rhythm this is much improved.  Discussed that she should try to obtain morning sunlight on a consistent basis, if possible even in the winter  -She asked about trazodone, we discussed that if her sleep is still stable in a few weeks she can reduce the dose to 25 mg before bed for a few weeks and then stop to see if it is still needed.  -If she is able to wean off of trazodone, can then resume melatonin 0.3 mg several hours before bed  -Asked her to contact me if her sleep starts to worsen/drift later  -Given that she is now sleeping in the evening/nighttime, agree would be best for her to avoid shift work with overnight shifts in the future.       Insomnia, unspecified type         Other iron deficiency anemia  Has had restless legs in the past, on oral iron managed by her PCP, as restless legs is not active will defer management to them.           Encounter provider Ramesh Wallace MD    The patient was identified by name and date of birth. Samantha Jaramillo was informed that this is a telemedicine visit and that the visit is being conducted through the Epic Embedded platform. She agrees to proceed..  My office door was closed. No one else was in the room.  She acknowledged consent and understanding of privacy and security of the video platform. The patient has agreed to participate and understands they can discontinue the visit at any time.    Patient is aware this is a billable service.     History of Present Illness     HPI    Samantha returns for a follow-up visit, she has a history of  "a delayed circadian rhythm/circadian rhythm sleep disorder last seen by me in September 2024.  I had recommended morning sunlight and low-dose melatonin which had provided modest benefit.  Overall, her sleep has improved and shifted earlier.  However, she describes that around the end of December, \"sleep tanked, I was getting very little\".  Was also sick at that time, would go 2 days without sleeping.  Since taking trazodone 50 mg (prescribed by her PCP), sleep has improved to where it was.  Not taking melatonin      Continues to take iron, managed by her PCP.     Goes to bed 9-10 pm  Asleep in less 30 minutes on trazodone  Sleeps through the night  Up at 4-6 AM   Sleeping 5-6 hours the past few days    RLS- not recently  No snoring    Not napping.  Does not doze.     Caffeine- 1 cup of tea in the AM  Alcohol- none     Not working anymore     Review of Systems  No depression or anxiety    Objective   There were no vitals taken for this visit.    Physical Exam    Visit Time  Total Visit Duration: 22  "

## 2025-01-28 ENCOUNTER — OFFICE VISIT (OUTPATIENT)
Dept: FAMILY MEDICINE CLINIC | Facility: CLINIC | Age: 35
End: 2025-01-28
Payer: COMMERCIAL

## 2025-01-28 VITALS
HEART RATE: 91 BPM | HEIGHT: 61 IN | BODY MASS INDEX: 22.84 KG/M2 | DIASTOLIC BLOOD PRESSURE: 78 MMHG | OXYGEN SATURATION: 96 % | SYSTOLIC BLOOD PRESSURE: 116 MMHG | WEIGHT: 121 LBS

## 2025-01-28 DIAGNOSIS — R00.2 PALPITATIONS: ICD-10-CM

## 2025-01-28 DIAGNOSIS — G47.00 INSOMNIA, UNSPECIFIED TYPE: Primary | ICD-10-CM

## 2025-01-28 DIAGNOSIS — F41.0 PANIC ATTACK: ICD-10-CM

## 2025-01-28 PROCEDURE — 99213 OFFICE O/P EST LOW 20 MIN: CPT | Performed by: FAMILY MEDICINE

## 2025-01-28 RX ORDER — TRAZODONE HYDROCHLORIDE 50 MG/1
TABLET, FILM COATED ORAL
Qty: 180 TABLET | Refills: 1 | Status: SHIPPED | OUTPATIENT
Start: 2025-01-28 | End: 2025-02-05

## 2025-01-28 NOTE — PROGRESS NOTES
Name: Samantha Jaramillo      : 1990      MRN: [id number]  Encounter Provider: Jonathan Joseph MD  Encounter Date: 2025   Encounter department: [facility]    Assessment & Plan  Insomnia, unspecified type    Orders:    traZODone (DESYREL) 50 mg tablet; TAKE 3-4 TABLETS AT BEDTIME AS NEEDED SLEEP    Panic attack         Palpitations           Assessment & Plan  1. Insomnia.  The gastrointestinal symptoms are unlikely to be attributed to trazodone, as this medication does not typically induce such side effects. The patient's anxiety at night may be exacerbated by inadequate sleep. She is advised to consider the use of a smart ring for monitoring sleep patterns, heart rate, blood pressure, and oxygen levels. She is also encouraged to maintain a record of any side effects experienced with the increased trazodone dosage. The trazodone dosage will be increased to 150 mg for a few nights. If this proves ineffective, the dosage will be further increased to 200 mg. She will provide an update on her condition by Monday. If the increased trazodone dosage does not yield the desired results, alternative medications such as Belsomra or Ambien may be considered.       History of Present Illness     History of Present Illness  The patient presents for evaluation of insomnia.    She has been experiencing increased anxiety during sleep initiation over the past 1 to 2 weeks, which she suspects may be a side effect of her current trazodone regimen. She has been on a 100 mg dose of trazodone and is considering an increase in dosage. She did not take trazodone yesterday as she was not feeling well. She attempted to use ZzzQuil as an alternative but found it ineffective, resulting in a sleepless night. She also reports episodes of diarrhea, occurring twice within the same period, accompanied by general gastrointestinal discomfort, nausea, and decreased appetite. She notes that her parents have recently  "experienced similar gastrointestinal symptoms. Additionally, she experienced a sudden spike in heart rate to 148 beats per minute on Sunday night, approximately an hour after going to bed, as recorded by her blood pressure cuff. This episode was transient, lasting only a few minutes before her heart rate returned to normal. She also mentions a period of low blood pressure, which has since improved. She has a history of insomnia, which she believes may be related to elevated body temperature. She has previously used Ambien for sleep but discontinued it due to tolerance development. She recalls periods of significant sleep deprivation, often going days without adequate sleep.    Supplemental Information  She has sought emergency care on several occasions for chest pain and tachycardia, but cardiac evaluations have consistently returned normal results.    FAMILY HISTORY  - Parents had some GI issues recently    MEDICATIONS  - Current: trazodone  - Past: Ambien     Review of Systems   All other systems reviewed and are negative.    Objective   /78   Pulse 91   Ht 5' 1\" (1.549 m)   Wt 54.9 kg (121 lb)   SpO2 96%   BMI 22.86 kg/m²     Physical Exam    Physical Exam  Vitals reviewed.   Constitutional:       Appearance: She is well-developed.   HENT:      Head: Normocephalic and atraumatic.   Pulmonary:      Effort: Pulmonary effort is normal.   Skin:     General: Skin is warm and dry.   Neurological:      Mental Status: She is alert and oriented to person, place, and time.   Psychiatric:         Behavior: Behavior normal.         Thought Content: Thought content normal.         Judgment: Judgment normal.         "

## 2025-01-29 ENCOUNTER — PATIENT MESSAGE (OUTPATIENT)
Dept: FAMILY MEDICINE CLINIC | Facility: CLINIC | Age: 35
End: 2025-01-29

## 2025-01-29 DIAGNOSIS — G47.00 INSOMNIA, UNSPECIFIED TYPE: Primary | ICD-10-CM

## 2025-01-29 RX ORDER — ZOLPIDEM TARTRATE 5 MG/1
5 TABLET ORAL
Qty: 14 TABLET | Refills: 0 | Status: SHIPPED | OUTPATIENT
Start: 2025-01-29 | End: 2025-02-05

## 2025-01-31 ENCOUNTER — APPOINTMENT (EMERGENCY)
Dept: RADIOLOGY | Facility: HOSPITAL | Age: 35
End: 2025-01-31
Payer: COMMERCIAL

## 2025-01-31 ENCOUNTER — HOSPITAL ENCOUNTER (EMERGENCY)
Facility: HOSPITAL | Age: 35
Discharge: HOME | End: 2025-02-01
Attending: EMERGENCY MEDICINE
Payer: COMMERCIAL

## 2025-01-31 ENCOUNTER — APPOINTMENT (EMERGENCY)
Dept: CT IMAGING | Facility: HOSPITAL | Age: 35
End: 2025-01-31
Payer: COMMERCIAL

## 2025-01-31 DIAGNOSIS — G43.909 MIGRAINE HEADACHE: Primary | ICD-10-CM

## 2025-01-31 DIAGNOSIS — R07.89 RIGHT-SIDED CHEST WALL PAIN: ICD-10-CM

## 2025-01-31 LAB
ALBUMIN SERPL BCG-MCNC: 5 G/DL (ref 3.5–5)
ALP SERPL-CCNC: 55 U/L (ref 34–104)
ALT SERPL W P-5'-P-CCNC: 5 U/L (ref 7–52)
ANION GAP SERPL CALCULATED.3IONS-SCNC: 9 MMOL/L (ref 4–13)
ANISOCYTOSIS BLD QL SMEAR: PRESENT
AST SERPL W P-5'-P-CCNC: 16 U/L (ref 13–39)
BASOPHILS # BLD MANUAL: 0 THOUSAND/UL (ref 0–0.1)
BASOPHILS NFR MAR MANUAL: 0 % (ref 0–1)
BILIRUB SERPL-MCNC: 0.57 MG/DL (ref 0.2–1)
BUN SERPL-MCNC: 10 MG/DL (ref 5–25)
CALCIUM SERPL-MCNC: 10.1 MG/DL (ref 8.4–10.2)
CARDIAC TROPONIN I PNL SERPL HS: <2 NG/L (ref ?–50)
CHLORIDE SERPL-SCNC: 104 MMOL/L (ref 96–108)
CO2 SERPL-SCNC: 24 MMOL/L (ref 21–32)
CREAT SERPL-MCNC: 0.59 MG/DL (ref 0.6–1.3)
EOSINOPHIL # BLD MANUAL: 0.07 THOUSAND/UL (ref 0–0.4)
EOSINOPHIL NFR BLD MANUAL: 1 % (ref 0–6)
ERYTHROCYTE [DISTWIDTH] IN BLOOD BY AUTOMATED COUNT: 12.5 % (ref 11.6–15.1)
GFR SERPL CREATININE-BSD FRML MDRD: 120 ML/MIN/1.73SQ M
GLUCOSE SERPL-MCNC: 102 MG/DL (ref 65–140)
HCT VFR BLD AUTO: 40.4 % (ref 34.8–46.1)
HGB BLD-MCNC: 13.5 G/DL (ref 11.5–15.4)
LIPASE SERPL-CCNC: 32 U/L (ref 11–82)
LYMPHOCYTES # BLD AUTO: 2.04 THOUSAND/UL (ref 0.6–4.47)
LYMPHOCYTES # BLD AUTO: 29 % (ref 14–44)
MCH RBC QN AUTO: 30.3 PG (ref 26.8–34.3)
MCHC RBC AUTO-ENTMCNC: 33.4 G/DL (ref 31.4–37.4)
MCV RBC AUTO: 91 FL (ref 82–98)
MONOCYTES # BLD AUTO: 0.35 THOUSAND/UL (ref 0–1.22)
MONOCYTES NFR BLD: 5 % (ref 4–12)
NEUTROPHILS # BLD MANUAL: 4.58 THOUSAND/UL (ref 1.85–7.62)
NEUTS SEG NFR BLD AUTO: 65 % (ref 43–75)
PLATELET # BLD AUTO: 266 THOUSANDS/UL (ref 149–390)
PLATELET BLD QL SMEAR: ADEQUATE
PMV BLD AUTO: 10.6 FL (ref 8.9–12.7)
POTASSIUM SERPL-SCNC: 4.3 MMOL/L (ref 3.5–5.3)
PROT SERPL-MCNC: 7.9 G/DL (ref 6.4–8.4)
RBC # BLD AUTO: 4.45 MILLION/UL (ref 3.81–5.12)
RBC MORPH BLD: PRESENT
SODIUM SERPL-SCNC: 137 MMOL/L (ref 135–147)
WBC # BLD AUTO: 7.04 THOUSAND/UL (ref 4.31–10.16)

## 2025-01-31 PROCEDURE — 99285 EMERGENCY DEPT VISIT HI MDM: CPT

## 2025-01-31 PROCEDURE — 71045 X-RAY EXAM CHEST 1 VIEW: CPT

## 2025-01-31 PROCEDURE — 36415 COLL VENOUS BLD VENIPUNCTURE: CPT

## 2025-01-31 PROCEDURE — 96375 TX/PRO/DX INJ NEW DRUG ADDON: CPT

## 2025-01-31 PROCEDURE — 84484 ASSAY OF TROPONIN QUANT: CPT

## 2025-01-31 PROCEDURE — 83690 ASSAY OF LIPASE: CPT | Performed by: PHYSICIAN ASSISTANT

## 2025-01-31 PROCEDURE — 85027 COMPLETE CBC AUTOMATED: CPT

## 2025-01-31 PROCEDURE — 80053 COMPREHEN METABOLIC PANEL: CPT

## 2025-01-31 PROCEDURE — 70450 CT HEAD/BRAIN W/O DYE: CPT

## 2025-01-31 PROCEDURE — 93005 ELECTROCARDIOGRAM TRACING: CPT

## 2025-01-31 PROCEDURE — 96374 THER/PROPH/DIAG INJ IV PUSH: CPT

## 2025-01-31 PROCEDURE — 85007 BL SMEAR W/DIFF WBC COUNT: CPT

## 2025-01-31 RX ORDER — DIPHENHYDRAMINE HYDROCHLORIDE 50 MG/ML
25 INJECTION INTRAMUSCULAR; INTRAVENOUS ONCE
Status: COMPLETED | OUTPATIENT
Start: 2025-01-31 | End: 2025-01-31

## 2025-01-31 RX ORDER — DEXAMETHASONE SODIUM PHOSPHATE 10 MG/ML
10 INJECTION, SOLUTION INTRAMUSCULAR; INTRAVENOUS ONCE
Status: COMPLETED | OUTPATIENT
Start: 2025-01-31 | End: 2025-01-31

## 2025-01-31 RX ORDER — METOCLOPRAMIDE HYDROCHLORIDE 5 MG/ML
10 INJECTION INTRAMUSCULAR; INTRAVENOUS ONCE
Status: COMPLETED | OUTPATIENT
Start: 2025-01-31 | End: 2025-01-31

## 2025-01-31 RX ORDER — MAGNESIUM SULFATE HEPTAHYDRATE 40 MG/ML
2 INJECTION, SOLUTION INTRAVENOUS ONCE
Status: COMPLETED | OUTPATIENT
Start: 2025-01-31 | End: 2025-01-31

## 2025-01-31 RX ADMIN — DIPHENHYDRAMINE HYDROCHLORIDE 25 MG: 50 INJECTION, SOLUTION INTRAMUSCULAR; INTRAVENOUS at 23:50

## 2025-01-31 RX ADMIN — METOCLOPRAMIDE 10 MG: 5 INJECTION, SOLUTION INTRAMUSCULAR; INTRAVENOUS at 23:50

## 2025-01-31 RX ADMIN — DEXAMETHASONE SODIUM PHOSPHATE 10 MG: 10 INJECTION INTRAMUSCULAR; INTRAVENOUS at 23:50

## 2025-01-31 RX ADMIN — MAGNESIUM SULFATE IN WATER FOR 2 G: 40 INJECTION INTRAVENOUS at 23:51

## 2025-02-01 VITALS
DIASTOLIC BLOOD PRESSURE: 76 MMHG | TEMPERATURE: 98.6 F | HEART RATE: 105 BPM | OXYGEN SATURATION: 99 % | SYSTOLIC BLOOD PRESSURE: 121 MMHG | RESPIRATION RATE: 20 BRPM

## 2025-02-01 LAB
2HR DELTA HS TROPONIN: >1 NG/L
CARDIAC TROPONIN I PNL SERPL HS: 3 NG/L (ref ?–50)

## 2025-02-01 PROCEDURE — 93005 ELECTROCARDIOGRAM TRACING: CPT

## 2025-02-01 PROCEDURE — 96375 TX/PRO/DX INJ NEW DRUG ADDON: CPT

## 2025-02-01 PROCEDURE — 99285 EMERGENCY DEPT VISIT HI MDM: CPT | Performed by: PHYSICIAN ASSISTANT

## 2025-02-01 PROCEDURE — 96361 HYDRATE IV INFUSION ADD-ON: CPT

## 2025-02-01 RX ORDER — FAMOTIDINE 10 MG/ML
20 INJECTION, SOLUTION INTRAVENOUS ONCE
Status: COMPLETED | OUTPATIENT
Start: 2025-02-01 | End: 2025-02-01

## 2025-02-01 RX ADMIN — FAMOTIDINE 20 MG: 10 INJECTION INTRAVENOUS at 00:07

## 2025-02-01 RX ADMIN — SODIUM CHLORIDE 500 ML: 0.9 INJECTION, SOLUTION INTRAVENOUS at 00:06

## 2025-02-01 NOTE — DISCHARGE INSTRUCTIONS
Patient Education     Chest Pain, Adult ED   General Information   You came to the Emergency Department (ED) for chest pain. The doctor feels that the risk of a serious cause for your chest pain is low. Many things can cause chest pain. Some are serious things like heart disease or lung disease. Less serious things like stomach problems can also cause chest pain.  The doctors may not be able to find all serious causes of chest pain the first time they see you. It is important that you follow up with your doctor. You may be waiting on some test results. The staff will notify you if there are concerning results.  What care is needed at home?   Call your regular doctor to let them know you were in the ED. Make a follow-up appointment if you were told to.  Follow your regular doctor’s orders to keep your blood pressure, cholesterol, and high blood sugar (diabetes) under control.  If you smoke, try to quit. Your doctor or nurse can help.  Keep a healthy weight. If you are too heavy, lose weight.  Eat a healthy diet, as discussed with your doctor or nurse.  When do I need to get emergency help?   Call for an ambulance if:   You have signs of a heart attack, which may include:  Severe chest pain, pressure, or discomfort with:  Breathing trouble; sweating; upset stomach; or cold, clammy skin.  Pain in your arms, back, or jaw.  Worse pain with activity like walking up stairs.  Fast or irregular heartbeat.  Feeling dizzy, faint, or weak.  When do I need to call the doctor?   You have a fever of 100.4°F (38°C) or higher or chills.  You cough up yellow or green mucus.  You are more tired than normal or more trouble breathing with activity.  You have new or worsening symptoms.  Last Reviewed Date   2020-06-28  Consumer Information Use and Disclaimer   This generalized information is a limited summary of diagnosis, treatment, and/or medication information. It is not meant to be comprehensive and should be used as a tool to help  "the user understand and/or assess potential diagnostic and treatment options. It does NOT include all information about conditions, treatments, medications, side effects, or risks that may apply to a specific patient. It is not intended to be medical advice or a substitute for the medical advice, diagnosis, or treatment of a health care provider based on the health care provider's examination and assessment of a patient’s specific and unique circumstances. Patients must speak with a health care provider for complete information about their health, medical questions, and treatment options, including any risks or benefits regarding use of medications. This information does not endorse any treatments or medications as safe, effective, or approved for treating a specific patient. [employer] and its affiliates disclaim any warranty or liability relating to this information or the use thereof. The use of this information is governed by the Terms of Use, available at https://www.[employer]/en/know/clinical-effectiveness-terms   Copyright   Copyright © 2024 [employer] and its affiliates and/or licensors. All rights reserved.  Patient Education     Migraine in adults   The Basics   Written by the doctors and editors at [employer]   What is migraine? -- This is a condition that involves a headache as well as other symptoms.  Migraine can affect both adults and children. It is more common in females. Migraine headaches, or \"attacks,\" often start mild and then get worse.  What are the symptoms of migraine in adults? -- Symptoms can include:   Headache - The headache gets worse over several hours and is usually throbbing. It often affects 1 side of the head.   Nausea and sometimes vomiting   Sensitivity to light and noise - Lying down in a quiet, dark room often helps.   Aura - Some people have something called a migraine \"aura.\" This is a symptom or feeling that happens before or during the migraine attack. The " "aura usually lasts a few minutes to an hour and then goes away. For most people, it lasts 15 to 30 minutes.  Each person's aura is different, but in most cases, the aura affects your vision. During an aura, you might:   See flashing lights, bright spots, or zigzag lines   Lose part of your vision   Have numbness and tingling of the lips, lower face, and fingers of 1 hand   Hear sounds or have ringing in their ears  People who get migraine with aura usually cannot take birth control pills that have estrogen. That's because they might increase the risk of stroke.  Many people get other symptoms of migraine. These symptoms can happen several hours or even a day before the headache. Doctors call these \"premonitory\" or \"prodromal\" symptoms. They might include yawning, feeling depressed, irritability, food cravings, constipation, or a stiff neck.  Will I need tests? -- Probably not. Your doctor or nurse will ask you questions and do an exam.  Can migraine attacks be prevented? -- Yes. Some people find that their migraine attacks are triggered by certain things. If you can avoid some of these things, you can lower your chances of getting migraine attacks.  You can also keep a \"headache diary.\" In the diary, write down:   Every time you have a migraine attack   What you ate and did before it started - This can help you decide if there is anything you should avoid eating or doing.   What medicine you took, and if it helped  Common migraine triggers include:   Stress   Hormonal changes   Skipping meals or not eating enough   Changes in the weather   Sleeping too much or too little   Bright or flashing lights   Drinking alcohol   Eating certain foods, such as aged cheese and hot dogs   Smoking or being around smoke  If your migraine attacks are frequent or severe, your doctor can suggest other ways to help prevent them. For example, it might help to learn relaxation techniques and ways to manage stress. There are also " medicines that can help.  Some people get migraine attacks just before or during their period. Medicine can help with this, too.  How are migraine attacks treated? -- There are many different medicines that can help with migraine. Your doctor can help you find the best treatment for your situation.  For mild migraine, your doctor might suggest an over-the-counter medicine to take during a migraine attack. These include:   Acetaminophen (sample brand name: Tylenol)   Ibuprofen (sample brand names: Advil, Motrin)   Naproxen (sample brand name: Aleve)   A medicine that combines acetaminophen, aspirin, and caffeine (sample brand name: Excedrin)  For more severe migraine, there are prescription medicines that can help. Some, such as medicines called triptans, help relieve the pain from a migraine attack. Other prescription medicines can help make migraine attacks happen less often. If you have severe nausea or vomiting with your migraine attacks, there are medicines that can help with that, too.  Do not try to treat frequent migraine attacks on your own with non-prescription pain medicines. Taking these too often can actually cause more headaches later.  What else can I do to feel better? -- You can try:   Resting in a quiet, dark room with a cool cloth on your forehead   Sleeping   Taking the medicine or medicines that your doctor suggested - Do not take medicines that you have not discussed with your doctor.  What if I want to get pregnant? -- Talk to your doctor or nurse before you start trying. Some medicines used to treat and prevent migraine are not safe during pregnancy, so you might need to switch medicines before you get pregnant.  Some people notice that their migraine symptoms actually get better during pregnancy and breastfeeding. This is related to hormonal changes in the body.  When should I call the doctor? -- Call your doctor or nurse if:    You think that you are having migraine attacks.   Your migraine  attacks get worse or more frequent.   You have new symptoms.  All topics are updated as new evidence becomes available and our peer review process is complete.  This topic retrieved from [employer] on: May 18, 2024.  Topic 152270 Version 9.0  Release: 32.4.3 - C32.137  © 2024 [employer], Inc. and/or its affiliates. All rights reserved.  Consumer Information Use and Disclaimer   Disclaimer: This generalized information is a limited summary of diagnosis, treatment, and/or medication information. It is not meant to be comprehensive and should be used as a tool to help the user understand and/or assess potential diagnostic and treatment options. It does NOT include all information about conditions, treatments, medications, side effects, or risks that may apply to a specific patient. It is not intended to be medical advice or a substitute for the medical advice, diagnosis, or treatment of a health care provider based on the health care provider's examination and assessment of a patient's specific and unique circumstances. Patients must speak with a health care provider for complete information about their health, medical questions, and treatment options, including any risks or benefits regarding use of medications. This information does not endorse any treatments or medications as safe, effective, or approved for treating a specific patient. [employer] and its affiliates disclaim any warranty or liability relating to this information or the use thereof.The use of this information is governed by the Terms of Use, available at https://www.w[employer]ters[employer]vetuwer.com/en/know/clinical-effectiveness-terms. 2024© [employer] Inc. and its affiliates and/or licensors. All rights reserved.  Copyright   © 2024 [employer] and/or its affiliates. All rights reserved      [facility]   Reading Physician Reading Date Result Priority  Bahman Dotson MD  [phone]  2/1/2025    Narrative & Impression  CT BRAIN - WITHOUT CONTRAST     INDICATION:    frontal headache; right sided headache.     COMPARISON:  None.     TECHNIQUE:  CT examination of the brain was performed.  Multiplanar 2D reformatted images were created from the source data.     Radiation dose length product (DLP) for this visit:  909 mGy-cm .  This examination, like all CT scans performed in the [facility], was performed utilizing techniques to minimize radiation dose exposure, including the use of iterative  reconstruction and automated exposure control.     IMAGE QUALITY:  Diagnostic.     FINDINGS:     PARENCHYMA:No intracranial mass, mass effect or midline shift. No CT signs of acute infarction.  No acute parenchymal hemorrhage.     VENTRICLES AND EXTRA-AXIAL SPACES:  Normal for the patient's age.     VISUALIZED ORBITS:  Normal visualized orbits.     PARANASAL SINUSES:  Normal visual  ized paranasal sinuses.     CALVARIUM AND EXTRACRANIAL SOFT TISSUES:  Normal.     IMPRESSION:     No acute intracranial hemorrhage, midline shift, or mass effect.

## 2025-02-01 NOTE — ED PROVIDER NOTES
"Time reflects when diagnosis was documented in both MDM as applicable and the Disposition within this note       Time User Action Codes Description Comment    2/1/2025 12:28 AM Quique Bradshaw [R07.89] Right-sided chest wall pain     2/1/2025 12:28 AM Quique Bradshaw [G43.909] Migraine headache     2/1/2025 12:28 AM Augustine Quique Modify [R07.89] Right-sided chest wall pain     2/1/2025 12:28 AM Quique Bradshaw [G43.909] Migraine headache           ED Disposition       ED Disposition   Discharge    Condition   Stable    Date/Time   Sat Feb 1, 2025 12:29 AM    Comment   Samantha Jaramillo discharge to home/self care.                   Assessment & Plan       Medical Decision Making    Patient is a 34-year-old female with a history of iron deficiency anemia, surgical history of tubal ligation bilaterally that presents to the emergency department dull aching persistent worsening right-sided head pain with radiation to generalized head for 1 day.   Patient hemodynamically stable and afebrile  No sirs  Chest x-ray with no acute [facility] disease  CT head without contrast/impression-\"no acute intracranial hemorrhage, midline shift, mass effect.\"  Negative serial troponins, ECG x 2 normal sinus rhythm, no ST elevation or depressions, no IN changes, normal Qtc  No leukocytosis, no bandemia, 5% segments, patient does report some URI symptoms, concerned with possibly \"coming down with something.\"  Normal electrolytes, normal kidney function  GCS 15, no acute focal neurological deficits, alert and oriented x 3    Delivered Pepcid, magnesium, Decadron, Benadryl, Reglan in the emergency department; patient demonstrates decrease in presenting headache ED symptomatology status post medication delivery  Ddx likely and not limited to migraine, stroke, tension headache, sinusitis, otitis media  Will treat for migraine, musculoskeletal chest wall pain  Follow-up with neurology  Follow-up with PCP  Follow up with emergency " department if symptoms persist or exacerbate  Patient demonstrates verbal understanding of all clinical laboratory and imaging findings, discharge instructions, follow-up, and verbally agrees with current treatment plan with teach back    *Due to voice recognition software, sound alike and misspelled words may be contained in the documentation*    Amount and/or Complexity of Data Reviewed  Labs: ordered. Decision-making details documented in ED Course.  Radiology: ordered and independent interpretation performed. Decision-making details documented in ED Course.  ECG/medicine tests: ordered and independent interpretation performed. Decision-making details documented in ED Course.    Risk  Prescription drug management.        ED Course as of 02/01/25 0318 Fri Jan 31, 2025   2324 Pt reports hx of bilateral salpingectomy        Medications   metoclopramide (REGLAN) injection 10 mg (10 mg Intravenous Given 1/31/25 2350)   dexamethasone (PF) (DECADRON) injection 10 mg (10 mg Intravenous Given 1/31/25 2350)   diphenhydrAMINE (BENADRYL) injection 25 mg (25 mg Intravenous Given 1/31/25 2350)   magnesium sulfate 2 g/50 mL IVPB (premix) 2 g (0 g Intravenous Stopped 1/31/25 2353)   Famotidine (PF) (PEPCID) injection 20 mg (20 mg Intravenous Given 2/1/25 0007)   sodium chloride 0.9 % bolus 500 mL (0 mL Intravenous Stopped 2/1/25 0050)       ED Risk Strat Scores      HEART Risk Score      Flowsheet Row Most Recent Value   Heart Score Risk Calculator    History 0 Filed at: 02/01/2025 0028   ECG 0 Filed at: 02/01/2025 0028   Age 0 Filed at: 02/01/2025 0028   Risk Factors 0 Filed at: 02/01/2025 0028   Troponin 0 Filed at: 02/01/2025 0028   HEART Score 0 Filed at: 02/01/2025 0028                        PERC Rule for PE      Flowsheet Row Most Recent Value   PERC Rule for PE    Age >=50 0 Filed at: 01/31/2025 2319   HR >=100 0 Filed at: 01/31/2025 2319   O2 Sat on room air < 95% 0 Filed at: 01/31/2025 2319   History of PE or DVT 0  Filed at: 01/31/2025 2319   Recent trauma or surgery 0 Filed at: 01/31/2025 2319   Hemoptysis 0 Filed at: 01/31/2025 2319   Exogenous estrogen 0 Filed at: 01/31/2025 2319   Unilateral leg swelling 0 Filed at: 01/31/2025 2319   PERC Rule for PE Results 0 Filed at: 01/31/2025 2319            SBIRT 22yo+      Flowsheet Row Most Recent Value   Initial Alcohol Screen: US AUDIT-C     1. How often do you have a drink containing alcohol? 0 Filed at: 01/31/2025 2151   2. How many drinks containing alcohol do you have on a typical day you are drinking?  0 Filed at: 01/31/2025 2151   3a. Male UNDER 65: How often do you have five or more drinks on one occasion? 0 Filed at: 01/31/2025 2151   3b. FEMALE Any Age, or MALE 65+: How often do you have 4 or more drinks on one occassion? 0 Filed at: 01/31/2025 2151   Audit-C Score 0 Filed at: 01/31/2025 2151   CAMMY: How many times in the past year have you...    Used an illegal drug or used a prescription medication for non-medical reasons? Never Filed at: 01/31/2025 2151            Wells' Criteria for PE      Flowsheet Row Most Recent Value   Wells' Criteria for PE    Clinical signs and symptoms of DVT 0 Filed at: 01/31/2025 2315   PE is primary diagnosis or equally likely 0 Filed at: 01/31/2025 2315   HR >100 0 Filed at: 01/31/2025 2315   Immobilization at least 3 days or Surgery in the previous 4 weeks 0 Filed at: 01/31/2025 2315   Previous, objectively diagnosed PE or DVT 0 Filed at: 01/31/2025 2315   Hemoptysis 0 Filed at: 01/31/2025 2315   Malignancy with treatment within 6 months or palliative 0 Filed at: 01/31/2025 2315   Wells' Criteria Total 0 Filed at: 01/31/2025 2315                        History of Present Illness       Chief Complaint   Patient presents with    Chest Pain     Patient comes in reporting CP intermittent throughout the day . Describes pain as stabbing. Denies SOB    Headache     Severe HA x 30 min on top of head. Rates pain 7/10. +Sensitivity to light,  denies nausea. +dizziness w/ movement.      Patient is a 34-year-old female with a history of iron deficiency anemia, surgical history of tubal ligation bilaterally that presents to the emergency department dull aching persistent worsening right-sided head pain with radiation to generalized head for 1 day.  Patient has associate symptomatology of light sensitivity beginning to current presentation of right-sided head pain.  Patient denies history of head pain to present.  Patient denies history of migraines, concussion, strokes.  Patient denies vision loss.  Patient denies thunderclap headache.  Patient denies numbness, tingling and loss of power in any of all extremities.  Patient also reports additional symptom of right sided dull aching nonradiating chest pain that has improved for 2 hours.  Patient denies history of DVT and PE.  Patient denies current cough or cold.  Patient does report that she had a URI earlier in the month and his improved since that point.  Patient denies palliative factors with provocative factors of looking at light.  Patient denies noneffective treatment.  Patient denies fevers, chills, nausea, vomiting, diarrhea, constipation and urinary symptoms.  Patient denies recent fall recent trauma.  Patient denies sick contacts recent travel.  Patient denies tenderness, dizziness, nausea, and vertiginous symptoms.  Patient denies new rash or skin changes.  Patient denies shortness breath, and abdominal pain.    Past Medical History:   Diagnosis Date    Iron deficiency       Past Surgical History:   Procedure Laterality Date    IL LAPAROSCOPY W/RMVL ADNEXAL STRUCTURES Bilateral 9/1/2020    Procedure: LAPAROSCOPIC SALPINGECTOMY;  Surgeon: Jodi Moon DO;  Location: [facility];  Service: Gynecology    TUBAL LIGATION      WISDOM TOOTH EXTRACTION        Family History   Problem Relation Age of Onset    Hypertension Mother     Endometrial cancer Mother     Hyperlipidemia Mother     Stroke  Mother     Hypertension Father     Autism Brother     Other Paternal Grandmother         lymes    No Known Problems Paternal Grandfather     No Known Problems Half-Brother     No Known Problems Half-Brother       Social History     Tobacco Use    Smoking status: Never    Smokeless tobacco: Never   Vaping Use    Vaping status: Never Used   Substance Use Topics    Alcohol use: Not Currently     Comment: occasionally     Drug use: Never      E-Cigarette/Vaping    E-Cigarette Use Never User       E-Cigarette/Vaping Substances    Nicotine No     THC No     CBD No     Flavoring No     Other No     Unknown No       I have reviewed and agree with the history as documented.       History provided by:  Patient   used: No    Chest Pain  Associated symptoms: headache    Associated symptoms: no abdominal pain, no back pain, no cough, no dizziness, no fever, no nausea, no numbness, no palpitations, no shortness of breath, not vomiting and no weakness    Headache  Associated symptoms: photophobia    Associated symptoms: no abdominal pain, no back pain, no congestion, no cough, no diarrhea, no dizziness, no drainage, no ear pain, no eye pain, no fever, no nausea, no neck pain, no neck stiffness, no numbness, no seizures, no sinus pressure, no sore throat, no vomiting and no weakness        Review of Systems   Constitutional:  Negative for activity change, appetite change, chills and fever.   HENT:  Negative for congestion, ear pain, postnasal drip, rhinorrhea, sinus pressure, sinus pain, sore throat and tinnitus.    Eyes:  Positive for photophobia. Negative for pain and visual disturbance.   Respiratory:  Negative for cough, chest tightness and shortness of breath.    Cardiovascular:  Positive for chest pain. Negative for palpitations.   Gastrointestinal:  Negative for abdominal pain, constipation, diarrhea, nausea and vomiting.   Genitourinary:  Negative for difficulty urinating, dysuria, flank pain, frequency,  hematuria and urgency.   Musculoskeletal:  Negative for arthralgias, back pain, gait problem, neck pain and neck stiffness.   Skin:  Negative for color change, pallor and rash.   Allergic/Immunologic: Negative for environmental allergies and food allergies.   Neurological:  Positive for headaches. Negative for dizziness, seizures, syncope, weakness and numbness.   Psychiatric/Behavioral:  Negative for confusion.    All other systems reviewed and are negative.          Objective       ED Triage Vitals [01/31/25 2150]   Temperature Pulse Blood Pressure Respirations SpO2 Patient Position - Orthostatic VS   98.6 °F (37 °C) 86 152/82 22 99 % Sitting      Temp Source Heart Rate Source BP Location FiO2 (%) Pain Score    Oral Monitor Right arm -- --      Vitals      Date and Time Temp Pulse SpO2 Resp BP Pain Score FACES Pain Rating User   02/01/25 0004 -- 105 99 % 20 121/76 -- -- AW   01/31/25 2150 98.6 °F (37 °C) 86 99 % 22 152/82 -- --             Physical Exam  Vitals and nursing note reviewed.   Constitutional:       General: She is awake.      Appearance: Normal appearance. She is well-developed and normal weight. She is not ill-appearing, toxic-appearing or diaphoretic.      Comments: /76 (BP Location: Right arm)   Pulse 105   Temp 98.6 °F (37 °C) (Oral)   Resp 20   LMP 01/17/2025 (Approximate)   SpO2 99%      HENT:      Head: Normocephalic and atraumatic.      Jaw: There is normal jaw occlusion.      Right Ear: Hearing, tympanic membrane, ear canal and external ear normal. No decreased hearing noted. No drainage, swelling or tenderness. No mastoid tenderness.      Left Ear: Hearing, tympanic membrane, ear canal and external ear normal. No decreased hearing noted. No drainage, swelling or tenderness. No mastoid tenderness.      Nose: Nose normal.      Mouth/Throat:      Lips: Pink.      Mouth: Mucous membranes are moist.      Pharynx: Oropharynx is clear. Uvula midline.   Eyes:      General: Lids are  normal. Vision grossly intact. Gaze aligned appropriately.         Right eye: No discharge.         Left eye: No discharge.      Extraocular Movements: Extraocular movements intact.      Conjunctiva/sclera: Conjunctivae normal.      Pupils: Pupils are equal, round, and reactive to light.   Neck:      Vascular: No JVD.      Trachea: Trachea and phonation normal. No tracheal tenderness or tracheal deviation.      Comments: No midline tenderness, no stepoffs  No tenderness with passive and active cervical ROM with head turning approximately 45 degree angles to the left and right  No cervical tenderness with cranial axial loading    Cardiovascular:      Rate and Rhythm: Normal rate and regular rhythm.      Pulses: Normal pulses.           Radial pulses are 2+ on the right side and 2+ on the left side.        Posterior tibial pulses are 2+ on the right side and 2+ on the left side.      Heart sounds: Normal heart sounds.   Pulmonary:      Effort: Pulmonary effort is normal.      Breath sounds: Normal breath sounds and air entry. No stridor. No decreased breath sounds, wheezing, rhonchi or rales.   Chest:      Chest wall: No tenderness.   Abdominal:      General: Abdomen is flat. Bowel sounds are normal. There is no distension.      Palpations: Abdomen is soft. Abdomen is not rigid.      Tenderness: There is no abdominal tenderness. There is no guarding or rebound.   Musculoskeletal:         General: Normal range of motion.      Cervical back: Full passive range of motion without pain, normal range of motion and neck supple. No rigidity. No spinous process tenderness or muscular tenderness. Normal range of motion.      Comments: Passive ROM intact  Upper and lower extremity 5/5 bilaterally  Neurovascularly intact  No grinding or clicking of joints       Feet:      Right foot:      Toenail Condition: Right toenails are normal.      Left foot:      Toenail Condition: Left toenails are normal.   Lymphadenopathy:      Head:       Right side of head: No submental, submandibular, tonsillar, preauricular, posterior auricular or occipital adenopathy.      Left side of head: No submental, submandibular, tonsillar, preauricular, posterior auricular or occipital adenopathy.      Cervical: No cervical adenopathy.      Right cervical: No superficial, deep or posterior cervical adenopathy.     Left cervical: No superficial, deep or posterior cervical adenopathy.   Skin:     General: Skin is warm.      Capillary Refill: Capillary refill takes less than 2 seconds.      Findings: No rash.   Neurological:      General: No focal deficit present.      Mental Status: She is alert and oriented to person, place, and time. Mental status is at baseline.      GCS: GCS eye subscore is 4. GCS verbal subscore is 5. GCS motor subscore is 6.      Cranial Nerves: Cranial nerves 2-12 are intact.      Sensory: Sensation is intact. No sensory deficit.      Motor: Motor function is intact.      Coordination: Coordination is intact.      Gait: Gait is intact.      Deep Tendon Reflexes: Reflexes are normal and symmetric.      Reflex Scores:       Patellar reflexes are 2+ on the right side and 2+ on the left side.  Psychiatric:         Attention and Perception: Attention normal.         Mood and Affect: Mood normal.         Speech: Speech normal.         Behavior: Behavior normal. Behavior is cooperative.         Thought Content: Thought content normal.         Judgment: Judgment normal.         Results Reviewed       Procedure Component Value Units Date/Time    HS Troponin I 2hr [[id number]]  (Normal) Collected: 01/31/25 2356    Lab Status: Final result Specimen: Blood from Arm, Left Updated: 02/01/25 0026     hs TnI 2hr 3 ng/L      Delta 2hr hsTnI >1 ng/L     Lipase [[id number]]  (Normal) Collected: 01/31/25 2156    Lab Status: Final result Specimen: Blood from Arm, Left Updated: 01/31/25 2335     Lipase 32 u/L     RBC Morphology Reflex Test [[id number]] Collected:  01/31/25 2156    Lab Status: Final result Specimen: Blood from Arm, Left Updated: 01/31/25 2301    CBC and differential [[id number]]  (Normal) Collected: 01/31/25 2156    Lab Status: Final result Specimen: Blood from Arm, Left Updated: 01/31/25 2253     WBC 7.04 Thousand/uL      RBC 4.45 Million/uL      Hemoglobin 13.5 g/dL      Hematocrit 40.4 %      MCV 91 fL      MCH 30.3 pg      MCHC 33.4 g/dL      RDW 12.5 %      MPV 10.6 fL      Platelets 266 Thousands/uL     Narrative:      This is an appended report.  These results have been appended to a previously verified report.    Manual Differential(PHLEBS Do Not Order) [[id number]] Collected: 01/31/25 2156    Lab Status: Final result Specimen: Blood from Arm, Left Updated: 01/31/25 2253     Segmented % 65 %      Lymphocytes % 29 %      Monocytes % 5 %      Eosinophils % 1 %      Basophils % 0 %      Absolute Neutrophils 4.58 Thousand/uL      Absolute Lymphocytes 2.04 Thousand/uL      Absolute Monocytes 0.35 Thousand/uL      Absolute Eosinophils 0.07 Thousand/uL      Absolute Basophils 0.00 Thousand/uL      Total Counted --     RBC Morphology Present     Platelet Estimate Adequate     Anisocytosis Present    HS Troponin 0hr (reflex protocol) [[id number]]  (Normal) Collected: 01/31/25 2156    Lab Status: Final result Specimen: Blood from Arm, Left Updated: 01/31/25 2233     hs TnI 0hr <2 ng/L     Comprehensive metabolic panel [[id number]]  (Abnormal) Collected: 01/31/25 2156    Lab Status: Final result Specimen: Blood from Arm, Left Updated: 01/31/25 2227     Sodium 137 mmol/L      Potassium 4.3 mmol/L      Chloride 104 mmol/L      CO2 24 mmol/L      ANION GAP 9 mmol/L      BUN 10 mg/dL      Creatinine 0.59 mg/dL      Glucose 102 mg/dL      Calcium 10.1 mg/dL      AST 16 U/L      ALT 5 U/L      Alkaline Phosphatase 55 U/L      Total Protein 7.9 g/dL      Albumin 5.0 g/dL      Total Bilirubin 0.57 mg/dL      eGFR 120 ml/min/1.73sq m     Narrative:      [employer]  D[employer] guidelines for Chronic Kidney Disease (CKD):     Stage 1 with normal or high GFR (GFR > 90 mL/min/1.73 square meters)    Stage 2 Mild CKD (GFR = 60-89 mL/min/1.73 square meters)    Stage 3A Moderate CKD (GFR = 45-59 mL/min/1.73 square meters)    Stage 3B Moderate CKD (GFR = 30-44 mL/min/1.73 square meters)    Stage 4 Severe CKD (GFR = 15-29 mL/min/1.73 square meters)    Stage 5 End Stage CKD (GFR <15 mL/min/1.73 square meters)  Note: GFR calculation is accurate only with a steady state creatinine            CT head without contrast   ED Interpretation by Quique Bradshaw PA-C (02/01 0027)   Reading Physician Reading Date Result Priority  Bahman Dotson MD  [phone]    2/1/2025     Narrative & Impression  CT BRAIN - WITHOUT CONTRAST     INDICATION:   frontal headache; right sided headache.     COMPARISON:  None.     TECHNIQUE:  CT examination of the brain was performed.  Multiplanar 2D reformatted images were created from the source data.     Radiation dose length product (DLP) for this visit:  909 mGy-cm .  This examination, like all CT scans performed in the [facility], was performed utilizing techniques to minimize radiation dose exposure, including the use of iterative   reconstruction and automated exposure control.     IMAGE QUALITY:  Diagnostic.     FINDINGS:     PARENCHYMA:No intracranial mass, mass effect or midline shift. No CT signs of acute infarction.  No acute parenchymal hemorrhage.     VENTRICLES AND EXTRA-AXIAL SPACES:  Normal for the patient's age.     VISUALIZED ORBITS:  Normal visualized orbits.     PARANASAL SINUSES:  Normal visual   ized paranasal sinuses.     CALVARIUM AND EXTRACRANIAL SOFT TISSUES:  Normal.     IMPRESSION:     No acute intracranial hemorrhage, midline shift, or mass effect.                 Workstation performed: [id number]        Final Interpretation by Bahman Dotson MD (02/01 0024)      No acute intracranial hemorrhage, midline shift, or  mass effect.                  Workstation performed: [id number]         XR chest 1 view portable   ED Interpretation by Quique Bradshaw PA-C (01/31 5847)   No acute cardiopulmonary disease on initial read by me      Final Interpretation by Jen Richards MD (02/01 0150)      No acute cardiopulmonary disease.            Workstation performed: [id number]             ECG 12 Lead Documentation Only    Date/Time: 1/31/2025 9:54 PM    Performed by: Quique Bradshaw PA-C  Authorized by: Quique Bradshaw PA-C    Indications / Diagnosis:  Right-sided chest pain  ECG reviewed by me, the ED Provider: yes    Patient location:  ED  Previous ECG:     Previous ECG:  Compared to current    Comparison ECG info:  When compared with ECG on December 28, 2024 no significant changes are noted.    Similarity:  No change    Comparison to cardiac monitor: Yes    Interpretation:     Interpretation: normal    Rate:     ECG rate:  84    ECG rate assessment: normal    Rhythm:     Rhythm: sinus rhythm    Ectopy:     Ectopy: none    QRS:     QRS axis:  Normal    QRS intervals:  Normal  Conduction:     Conduction: normal    ST segments:     ST segments:  Normal  T waves:     T waves: normal    ECG 12 Lead Documentation Only    Date/Time: 2/1/2025 12:11 AM    Performed by: Quique Bradshaw PA-C  Authorized by: Quique Bradshaw PA-C    Indications / Diagnosis:  Right sided chest pain  ECG reviewed by me, the ED Provider: yes    Patient location:  ED  Previous ECG:     Previous ECG:  Compared to current    Comparison ECG info:  When compared with ECG on January 31, 2025 2154, no significant changes were noted.    Similarity:  No change    Comparison to cardiac monitor: Yes    Interpretation:     Interpretation: normal    Rate:     ECG rate:  85    ECG rate assessment: normal    Rhythm:     Rhythm: sinus rhythm    Ectopy:     Ectopy: none    QRS:     QRS axis:  Normal    QRS intervals:  Normal  Conduction:     Conduction: normal    ST segments:     ST segments:   Normal  T waves:     T waves: normal        ED Medication and Procedure Management   Prior to Admission Medications   Prescriptions Last Dose Informant Patient Reported? Taking?   ferrous sulfate 324 (65 Fe) mg  Self No No   Sig: TAKE 1 TABLET (324 MG TOTAL) BY MOUTH 2 (TWO) TIMES A DAY BEFORE MEALS   traZODone (DESYREL) 50 mg tablet   No No   Sig: TAKE 3-4 TABLETS AT BEDTIME AS NEEDED SLEEP   zolpidem (AMBIEN) 5 mg tablet   No No   Sig: Take 1 tablet (5 mg total) by mouth daily at bedtime for 14 days      Facility-Administered Medications: None     Discharge Medication List as of 2/1/2025 12:39 AM        CONTINUE these medications which have NOT CHANGED    Details   ferrous sulfate 324 (65 Fe) mg TAKE 1 TABLET (324 MG TOTAL) BY MOUTH 2 (TWO) TIMES A DAY BEFORE MEALS, Starting Tue 6/15/2021, Normal      traZODone (DESYREL) 50 mg tablet TAKE 3-4 TABLETS AT BEDTIME AS NEEDED SLEEP, Fill Later      zolpidem (AMBIEN) 5 mg tablet Take 1 tablet (5 mg total) by mouth daily at bedtime for 14 days, Starting Wed 1/29/2025, Until Wed 2/12/2025, Normal           No discharge procedures on file.  ED SEPSIS DOCUMENTATION   Time reflects when diagnosis was documented in both MDM as applicable and the Disposition within this note       Time User Action Codes Description Comment    2/1/2025 12:28 AM Quique Bradshaw [R07.89] Right-sided chest wall pain     2/1/2025 12:28 AM Quique Bradshaw [G43.909] Migraine headache     2/1/2025 12:28 AM Quique Bradshaw Modify [R07.89] Right-sided chest wall pain     2/1/2025 12:28 AM Quique Bradshaw [G43.909] Migraine headache                  Quique Bradshaw PA-C  02/01/25 0318

## 2025-02-02 LAB
ATRIAL RATE: 84 BPM
ATRIAL RATE: 85 BPM
P AXIS: 67 DEGREES
P AXIS: 70 DEGREES
PR INTERVAL: 136 MS
PR INTERVAL: 144 MS
QRS AXIS: 63 DEGREES
QRS AXIS: 72 DEGREES
QRSD INTERVAL: 74 MS
QRSD INTERVAL: 78 MS
QT INTERVAL: 368 MS
QT INTERVAL: 390 MS
QTC INTERVAL: 434 MS
QTC INTERVAL: 464 MS
T WAVE AXIS: 55 DEGREES
T WAVE AXIS: 59 DEGREES
VENTRICULAR RATE: 84 BPM
VENTRICULAR RATE: 85 BPM

## 2025-02-02 PROCEDURE — 93010 ELECTROCARDIOGRAM REPORT: CPT | Performed by: INTERNAL MEDICINE

## 2025-02-04 ENCOUNTER — HOSPITAL ENCOUNTER (EMERGENCY)
Facility: HOSPITAL | Age: 35
Discharge: HOME | End: 2025-02-04
Attending: EMERGENCY MEDICINE | Admitting: EMERGENCY MEDICINE
Payer: COMMERCIAL

## 2025-02-04 ENCOUNTER — APPOINTMENT (EMERGENCY)
Dept: RADIOLOGY | Facility: HOSPITAL | Age: 35
End: 2025-02-04
Payer: COMMERCIAL

## 2025-02-04 VITALS
WEIGHT: 116 LBS | TEMPERATURE: 98.2 F | BODY MASS INDEX: 21.9 KG/M2 | HEIGHT: 61 IN | SYSTOLIC BLOOD PRESSURE: 124 MMHG | OXYGEN SATURATION: 98 % | DIASTOLIC BLOOD PRESSURE: 73 MMHG | HEART RATE: 66 BPM | RESPIRATION RATE: 16 BRPM

## 2025-02-04 DIAGNOSIS — R07.9 CHEST PAIN: ICD-10-CM

## 2025-02-04 DIAGNOSIS — F41.0 ANXIETY ATTACK: Primary | ICD-10-CM

## 2025-02-04 LAB
ALBUMIN SERPL BCG-MCNC: 5.1 G/DL (ref 3.5–5)
ALP SERPL-CCNC: 49 U/L (ref 34–104)
ALT SERPL W P-5'-P-CCNC: 8 U/L (ref 7–52)
ANION GAP SERPL CALCULATED.3IONS-SCNC: 11 MMOL/L (ref 4–13)
AST SERPL W P-5'-P-CCNC: 25 U/L (ref 13–39)
BASOPHILS # BLD AUTO: 0.04 THOUSANDS/ΜL (ref 0–0.1)
BASOPHILS NFR BLD AUTO: 1 % (ref 0–1)
BILIRUB SERPL-MCNC: 0.53 MG/DL (ref 0.2–1)
BUN SERPL-MCNC: 5 MG/DL (ref 5–25)
CALCIUM SERPL-MCNC: 10.3 MG/DL (ref 8.4–10.2)
CARDIAC TROPONIN I PNL SERPL HS: <2 NG/L (ref ?–50)
CHLORIDE SERPL-SCNC: 101 MMOL/L (ref 96–108)
CO2 SERPL-SCNC: 27 MMOL/L (ref 21–32)
CREAT SERPL-MCNC: 0.63 MG/DL (ref 0.6–1.3)
EOSINOPHIL # BLD AUTO: 0.03 THOUSAND/ΜL (ref 0–0.61)
EOSINOPHIL NFR BLD AUTO: 1 % (ref 0–6)
ERYTHROCYTE [DISTWIDTH] IN BLOOD BY AUTOMATED COUNT: 12.5 % (ref 11.6–15.1)
GFR SERPL CREATININE-BSD FRML MDRD: 118 ML/MIN/1.73SQ M
GLUCOSE SERPL-MCNC: 100 MG/DL (ref 65–140)
HCT VFR BLD AUTO: 42.8 % (ref 34.8–46.1)
HGB BLD-MCNC: 14.2 G/DL (ref 11.5–15.4)
IMM GRANULOCYTES # BLD AUTO: 0.01 THOUSAND/UL (ref 0–0.2)
IMM GRANULOCYTES NFR BLD AUTO: 0 % (ref 0–2)
LIPASE SERPL-CCNC: 23 U/L (ref 11–82)
LYMPHOCYTES # BLD AUTO: 1.59 THOUSANDS/ΜL (ref 0.6–4.47)
LYMPHOCYTES NFR BLD AUTO: 25 % (ref 14–44)
MCH RBC QN AUTO: 30.5 PG (ref 26.8–34.3)
MCHC RBC AUTO-ENTMCNC: 33.2 G/DL (ref 31.4–37.4)
MCV RBC AUTO: 92 FL (ref 82–98)
MONOCYTES # BLD AUTO: 0.34 THOUSAND/ΜL (ref 0.17–1.22)
MONOCYTES NFR BLD AUTO: 5 % (ref 4–12)
NEUTROPHILS # BLD AUTO: 4.31 THOUSANDS/ΜL (ref 1.85–7.62)
NEUTS SEG NFR BLD AUTO: 68 % (ref 43–75)
NRBC BLD AUTO-RTO: 0 /100 WBCS
PLATELET # BLD AUTO: 317 THOUSANDS/UL (ref 149–390)
PMV BLD AUTO: 10.8 FL (ref 8.9–12.7)
POTASSIUM SERPL-SCNC: 5 MMOL/L (ref 3.5–5.3)
PROT SERPL-MCNC: 8.4 G/DL (ref 6.4–8.4)
RBC # BLD AUTO: 4.66 MILLION/UL (ref 3.81–5.12)
SODIUM SERPL-SCNC: 139 MMOL/L (ref 135–147)
WBC # BLD AUTO: 6.32 THOUSAND/UL (ref 4.31–10.16)

## 2025-02-04 PROCEDURE — 93005 ELECTROCARDIOGRAM TRACING: CPT

## 2025-02-04 PROCEDURE — 80053 COMPREHEN METABOLIC PANEL: CPT | Performed by: EMERGENCY MEDICINE

## 2025-02-04 PROCEDURE — 36415 COLL VENOUS BLD VENIPUNCTURE: CPT | Performed by: EMERGENCY MEDICINE

## 2025-02-04 PROCEDURE — 99285 EMERGENCY DEPT VISIT HI MDM: CPT | Performed by: EMERGENCY MEDICINE

## 2025-02-04 PROCEDURE — 96374 THER/PROPH/DIAG INJ IV PUSH: CPT

## 2025-02-04 PROCEDURE — 71045 X-RAY EXAM CHEST 1 VIEW: CPT

## 2025-02-04 PROCEDURE — 84484 ASSAY OF TROPONIN QUANT: CPT | Performed by: EMERGENCY MEDICINE

## 2025-02-04 PROCEDURE — 83690 ASSAY OF LIPASE: CPT | Performed by: EMERGENCY MEDICINE

## 2025-02-04 PROCEDURE — 85025 COMPLETE CBC W/AUTO DIFF WBC: CPT | Performed by: EMERGENCY MEDICINE

## 2025-02-04 PROCEDURE — 99284 EMERGENCY DEPT VISIT MOD MDM: CPT

## 2025-02-04 RX ORDER — KETOROLAC TROMETHAMINE 30 MG/ML
15 INJECTION, SOLUTION INTRAMUSCULAR; INTRAVENOUS ONCE
Status: COMPLETED | OUTPATIENT
Start: 2025-02-04 | End: 2025-02-04

## 2025-02-04 RX ADMIN — KETOROLAC TROMETHAMINE 15 MG: 30 INJECTION, SOLUTION INTRAMUSCULAR at 21:42

## 2025-02-05 DIAGNOSIS — G47.00 INSOMNIA, UNSPECIFIED TYPE: ICD-10-CM

## 2025-02-05 RX ORDER — ZOLPIDEM TARTRATE 10 MG/1
10 TABLET ORAL
Qty: 14 TABLET | Refills: 0 | Status: SHIPPED | OUTPATIENT
Start: 2025-02-05 | End: 2025-02-19

## 2025-02-05 NOTE — ED PROVIDER NOTES
Time reflects when diagnosis was documented in both MDM as applicable and the Disposition within this note       Time User Action Codes Description Comment    2/4/2025 10:50 PM Hellen Farrell Add [F41.0] Anxiety attack     2/4/2025 10:50 PM Hellen Farrell Add [R07.9] Chest pain           ED Disposition       ED Disposition   Discharge    Condition   Stable    Date/Time   Tue Feb 4, 2025 10:50 PM    Comment   Samantha Jaramillo discharge to home/self care.                   Assessment & Plan       Medical Decision Making  Differential diagnosis includes anxiety attack, panic disorder, musculoskeletal chest pain, anemia less likely ACS or pneumothorax    Problems Addressed:  Anxiety attack: acute illness or injury  Chest pain: acute illness or injury    Amount and/or Complexity of Data Reviewed  Labs: ordered. Decision-making details documented in ED Course.  Radiology: ordered and independent interpretation performed.     Details: Chest x-ray within normal limits no pneumothorax no pneumonia    Risk  OTC drugs.  Prescription drug management.  Risk Details: Motrin or Tylenol as needed for pain continue all current medications             Medications   ketorolac (TORADOL) injection 15 mg (15 mg Intravenous Given 2/4/25 2142)       ED Risk Strat Scores                          SBIRT 22yo+      Flowsheet Row Most Recent Value   Initial Alcohol Screen: US AUDIT-C     1. How often do you have a drink containing alcohol? 0 Filed at: 02/04/2025 2058   2. How many drinks containing alcohol do you have on a typical day you are drinking?  0 Filed at: 02/04/2025 2058   3a. Male UNDER 65: How often do you have five or more drinks on one occasion? 0 Filed at: 02/04/2025 2058   3b. FEMALE Any Age, or MALE 65+: How often do you have 4 or more drinks on one occassion? 0 Filed at: 02/04/2025 2058   Audit-C Score 0 Filed at: 02/04/2025 2058   CAMMY: How many times in the past year have you...    Used an illegal drug or used a  "prescription medication for non-medical reasons? Never Filed at: 02/04/2025 2058                            History of Present Illness       Chief Complaint   Patient presents with    Anxiety     Reports feelings of anxiety starting tonight around 730, hx of anxiety and panic attacks, takes Ambien daily at bedtime. Reports feeling chest \"warmth\" and tension, denies chest pain. Also c/o throbbing on the right side of her back. Took tylenol and pepto around 5pm with little relief.        Past Medical History:   Diagnosis Date    Iron deficiency       Past Surgical History:   Procedure Laterality Date    MA LAPAROSCOPY W/RMVL ADNEXAL STRUCTURES Bilateral 9/1/2020    Procedure: LAPAROSCOPIC SALPINGECTOMY;  Surgeon: Jodi Moon DO;  Location: [facility];  Service: Gynecology    TUBAL LIGATION      WISDOM TOOTH EXTRACTION        Family History   Problem Relation Age of Onset    Hypertension Mother     Endometrial cancer Mother     Hyperlipidemia Mother     Stroke Mother     Hypertension Father     Autism Brother     Other Paternal Grandmother         lymes    No Known Problems Paternal Grandfather     No Known Problems Half-Brother     No Known Problems Half-Brother       Social History     Tobacco Use    Smoking status: Never    Smokeless tobacco: Never   Vaping Use    Vaping status: Never Used   Substance Use Topics    Alcohol use: Not Currently     Comment: occasionally     Drug use: Never      E-Cigarette/Vaping    E-Cigarette Use Never User       E-Cigarette/Vaping Substances    Nicotine No     THC No     CBD No     Flavoring No     Other No     Unknown No       I have reviewed and agree with the history as documented.     34-year-old female comes in for evaluation of chest pain chief patient complains of mid epigastric pain that radiates into her back and also right upper quadrant pain.  Describes it as a burning sensation.  Is unsure if this is a panic attack or something else states pain sometimes radiates " down her arm      Anxiety      Review of Systems        Objective       ED Triage Vitals [02/04/25 2054]   Temperature Pulse Blood Pressure Respirations SpO2 Patient Position - Orthostatic VS   98.2 °F (36.8 °C) 66 124/73 16 98 % Lying      Temp Source Heart Rate Source BP Location FiO2 (%) Pain Score    Oral Monitor Right arm -- 6      Vitals      Date and Time Temp Pulse SpO2 Resp BP Pain Score FACES Pain Rating User   02/04/25 2142 -- -- -- -- -- 2 -- DS   02/04/25 2054 98.2 °F (36.8 °C) 66 98 % 16 124/73 6 -- DS            Physical Exam    Results Reviewed       Procedure Component Value Units Date/Time    Comprehensive metabolic panel [[id number]]  (Abnormal) Collected: 02/04/25 2224    Lab Status: Final result Specimen: Blood from Arm, Right Updated: 02/04/25 2239     Sodium 139 mmol/L      Potassium 5.0 mmol/L      Chloride 101 mmol/L      CO2 27 mmol/L      ANION GAP 11 mmol/L      BUN 5 mg/dL      Creatinine 0.63 mg/dL      Glucose 100 mg/dL      Calcium 10.3 mg/dL      AST 25 U/L      ALT 8 U/L      Alkaline Phosphatase 49 U/L      Total Protein 8.4 g/dL      Albumin 5.1 g/dL      Total Bilirubin 0.53 mg/dL      eGFR 118 ml/min/1.73sq m     Narrative:      National Kidney [employer] Foundation guidelines for Chronic Kidney Disease (CKD):     Stage 1 with normal or high GFR (GFR > 90 mL/min/1.73 square meters)    Stage 2 Mild CKD (GFR = 60-89 mL/min/1.73 square meters)    Stage 3A Moderate CKD (GFR = 45-59 mL/min/1.73 square meters)    Stage 3B Moderate CKD (GFR = 30-44 mL/min/1.73 square meters)    Stage 4 Severe CKD (GFR = 15-29 mL/min/1.73 square meters)    Stage 5 End Stage CKD (GFR <15 mL/min/1.73 square meters)  Note: GFR calculation is accurate only with a steady state creatinine    HS Troponin 0hr (reflex protocol) [[id number]]  (Normal) Collected: 02/04/25 2143    Lab Status: Final result Specimen: Blood from Arm, Right Updated: 02/04/25 2220     hs TnI 0hr <2 ng/L     Lipase [[id number]]  (Normal)  Collected: 02/04/25 2143    Lab Status: Final result Specimen: Blood from Arm, Right Updated: 02/04/25 2213     Lipase 23 u/L     CBC and differential [[id number]] Collected: 02/04/25 2143    Lab Status: Final result Specimen: Blood from Arm, Right Updated: 02/04/25 2148     WBC 6.32 Thousand/uL      RBC 4.66 Million/uL      Hemoglobin 14.2 g/dL      Hematocrit 42.8 %      MCV 92 fL      MCH 30.5 pg      MCHC 33.2 g/dL      RDW 12.5 %      MPV 10.8 fL      Platelets 317 Thousands/uL      nRBC 0 /100 WBCs      Segmented % 68 %      Immature Grans % 0 %      Lymphocytes % 25 %      Monocytes % 5 %      Eosinophils Relative 1 %      Basophils Relative 1 %      Absolute Neutrophils 4.31 Thousands/µL      Absolute Immature Grans 0.01 Thousand/uL      Absolute Lymphocytes 1.59 Thousands/µL      Absolute Monocytes 0.34 Thousand/µL      Eosinophils Absolute 0.03 Thousand/µL      Basophils Absolute 0.04 Thousands/µL             XR chest 1 view portable    (Results Pending)       Procedures    ED Medication and Procedure Management   Prior to Admission Medications   Prescriptions Last Dose Informant Patient Reported? Taking?   ferrous sulfate 324 (65 Fe) mg  Self No No   Sig: TAKE 1 TABLET (324 MG TOTAL) BY MOUTH 2 (TWO) TIMES A DAY BEFORE MEALS   traZODone (DESYREL) 50 mg tablet   No No   Sig: TAKE 3-4 TABLETS AT BEDTIME AS NEEDED SLEEP   zolpidem (AMBIEN) 5 mg tablet   No No   Sig: Take 1 tablet (5 mg total) by mouth daily at bedtime for 14 days      Facility-Administered Medications: None     Patient's Medications   Discharge Prescriptions    No medications on file     No discharge procedures on file.  ED SEPSIS DOCUMENTATION   Time reflects when diagnosis was documented in both MDM as applicable and the Disposition within this note       Time User Action Codes Description Comment    2/4/2025 10:50 PM Hellen Farrell Add [F41.0] Anxiety attack     2/4/2025 10:50 PM Hellen Farrell Add [R07.9] Chest pain                   Hellen Farrell,   02/04/25 9265

## 2025-02-06 LAB
ATRIAL RATE: 67 BPM
P AXIS: 53 DEGREES
PR INTERVAL: 138 MS
QRS AXIS: 73 DEGREES
QRSD INTERVAL: 88 MS
QT INTERVAL: 396 MS
QTC INTERVAL: 418 MS
T WAVE AXIS: 68 DEGREES
VENTRICULAR RATE: 67 BPM

## 2025-02-06 PROCEDURE — 93010 ELECTROCARDIOGRAM REPORT: CPT | Performed by: INTERNAL MEDICINE

## 2025-02-07 ENCOUNTER — APPOINTMENT (OUTPATIENT)
Dept: LAB | Facility: CLINIC | Age: 35
End: 2025-02-07
Payer: COMMERCIAL

## 2025-02-07 ENCOUNTER — OFFICE VISIT (OUTPATIENT)
Dept: FAMILY MEDICINE CLINIC | Facility: CLINIC | Age: 35
End: 2025-02-07
Payer: COMMERCIAL

## 2025-02-07 VITALS
WEIGHT: 117 LBS | OXYGEN SATURATION: 99 % | BODY MASS INDEX: 22.09 KG/M2 | SYSTOLIC BLOOD PRESSURE: 110 MMHG | DIASTOLIC BLOOD PRESSURE: 78 MMHG | HEIGHT: 61 IN | HEART RATE: 88 BPM

## 2025-02-07 DIAGNOSIS — F41.9 ANXIETY: ICD-10-CM

## 2025-02-07 DIAGNOSIS — E55.9 VITAMIN D DEFICIENCY: ICD-10-CM

## 2025-02-07 DIAGNOSIS — F41.0 PANIC ATTACK: ICD-10-CM

## 2025-02-07 DIAGNOSIS — Z00.6 ENCOUNTER FOR EXAMINATION FOR NORMAL COMPARISON OR CONTROL IN CLINICAL RESEARCH PROGRAM: ICD-10-CM

## 2025-02-07 DIAGNOSIS — E78.2 MIXED HYPERLIPIDEMIA: ICD-10-CM

## 2025-02-07 DIAGNOSIS — G47.00 INSOMNIA, UNSPECIFIED TYPE: ICD-10-CM

## 2025-02-07 DIAGNOSIS — G47.00 INSOMNIA, UNSPECIFIED TYPE: Primary | ICD-10-CM

## 2025-02-07 DIAGNOSIS — E61.1 IRON DEFICIENCY: ICD-10-CM

## 2025-02-07 LAB
25(OH)D3 SERPL-MCNC: 31.7 NG/ML (ref 30–100)
CRP SERPL QL: <1 MG/L
ERYTHROCYTE [SEDIMENTATION RATE] IN BLOOD: 13 MM/HOUR (ref 0–19)
FERRITIN SERPL-MCNC: 15 NG/ML (ref 11–307)
IRON SATN MFR SERPL: 23 % (ref 15–50)
IRON SERPL-MCNC: 91 UG/DL (ref 50–212)
TIBC SERPL-MCNC: 392 UG/DL (ref 250–450)
TRANSFERRIN SERPL-MCNC: 280 MG/DL (ref 203–362)
UIBC SERPL-MCNC: 301 UG/DL (ref 155–355)

## 2025-02-07 PROCEDURE — 82728 ASSAY OF FERRITIN: CPT

## 2025-02-07 PROCEDURE — 85652 RBC SED RATE AUTOMATED: CPT

## 2025-02-07 PROCEDURE — 86225 DNA ANTIBODY NATIVE: CPT

## 2025-02-07 PROCEDURE — 82306 VITAMIN D 25 HYDROXY: CPT

## 2025-02-07 PROCEDURE — 36415 COLL VENOUS BLD VENIPUNCTURE: CPT

## 2025-02-07 PROCEDURE — 86140 C-REACTIVE PROTEIN: CPT

## 2025-02-07 PROCEDURE — 83540 ASSAY OF IRON: CPT

## 2025-02-07 PROCEDURE — 86038 ANTINUCLEAR ANTIBODIES: CPT

## 2025-02-07 PROCEDURE — 99213 OFFICE O/P EST LOW 20 MIN: CPT | Performed by: FAMILY MEDICINE

## 2025-02-07 PROCEDURE — 83550 IRON BINDING TEST: CPT

## 2025-02-07 RX ORDER — ALPRAZOLAM 1 MG/1
1 TABLET, EXTENDED RELEASE ORAL EVERY MORNING
Qty: 21 TABLET | Refills: 0 | Status: CANCELLED | OUTPATIENT
Start: 2025-02-07

## 2025-02-07 RX ORDER — ALPRAZOLAM 1 MG/1
1 TABLET ORAL
Qty: 21 TABLET | Refills: 0 | Status: SHIPPED | OUTPATIENT
Start: 2025-02-07 | End: 2025-02-28

## 2025-02-07 RX ORDER — ESCITALOPRAM OXALATE 10 MG/1
10 TABLET ORAL DAILY
Qty: 30 TABLET | Refills: 5 | Status: SHIPPED | OUTPATIENT
Start: 2025-02-07

## 2025-02-07 NOTE — PROGRESS NOTES
Name: Samantha Jaramillo      : 1990      MRN: [id number]  Encounter Provider: Jonathan Joseph MD  Encounter Date: 2025   Encounter department: [facility]    Assessment & Plan  Insomnia, unspecified type    Orders:    Sedimentation rate, automated; Future    C-reactive protein; Future    FRANC Screen w/Reflex Cascade; Future    escitalopram (Lexapro) 10 mg tablet; Take 1 tablet (10 mg total) by mouth daily    ALPRAZolam (XANAX) 1 mg tablet; Take 1 tablet (1 mg total) by mouth daily at bedtime as needed for anxiety for up to 21 days    Panic attack    Orders:    Sedimentation rate, automated; Future    C-reactive protein; Future    FRANC Screen w/Reflex Cascade; Future    escitalopram (Lexapro) 10 mg tablet; Take 1 tablet (10 mg total) by mouth daily    ALPRAZolam (XANAX) 1 mg tablet; Take 1 tablet (1 mg total) by mouth daily at bedtime as needed for anxiety for up to 21 days    Anxiety    Orders:    Sedimentation rate, automated; Future    C-reactive protein; Future    FRANC Screen w/Reflex Cascade; Future    escitalopram (Lexapro) 10 mg tablet; Take 1 tablet (10 mg total) by mouth daily    ALPRAZolam (XANAX) 1 mg tablet; Take 1 tablet (1 mg total) by mouth daily at bedtime as needed for anxiety for up to 21 days      Assessment & Plan  1. Sleep disturbances.  Her sleep monitor data indicates a total of 4.5 hours of sleep last night, with 39 minutes of deep sleep, 2.5 hours of light sleep, and 1 hour of REM sleep. On a previous occasion, the monitor recorded a total of 6 hours of sleep, including 1.5 hours of deep sleep and 3 hours of light sleep. Her blood work from the ER visit revealed normal results, including blood count, kidney function, liver function, and electrolytes. The chest x-ray did not show any widened mediastinum or other abnormalities. The CT scan of her head was also unremarkable. She is advised to maintain a regular exercise routine and establish a consistent sleep schedule.  She is encouraged to continue using her sleep monitor. A transition from Ambien to Xanax will be initiated, with a prescription provided for a 21-day supply. She is also advised to take Lexapro in the morning.    2. Headache.  She experienced a sudden intense headache last Friday, which was evaluated in the ER. A CT scan was performed and returned normal results. She was given a migraine cocktail, which provided relief.    3. Anxiety.  She reported experiencing anxiety or panic attacks, with symptoms including chest pains and back pains. She has a history of trying Lexapro in the past. A prescription for Lexapro will be provided to be taken daily for 6 months to help stabilize brain chemicals and reduce anxiety.    4. Back pain.  She has been experiencing back pain, particularly at night, which sometimes radiates to the chest or arms. The pain is described as muscular in nature. An autoimmune FRANC blood test will be ordered to rule out any underlying conditions.    5. Decreased appetite.  She reported a significant decrease in appetite over the past few weeks, leading to minimal food intake. She is advised to consume half a liter of Smartwater daily for a couple of weeks to ensure adequate electrolyte intake. Additionally, she is recommended to drink one Ensure or Vitamin Plus protein shake daily for a couple of weeks to improve her nutritional status.    Follow-up  The patient will follow up in 1 month.       History of Present Illness     History of Present Illness  The patient is a 34-year-old female who presents for evaluation of sleep issues, headache, anxiety, back pain, and decreased appetite. She is accompanied by her father.    She reports that her sleep has improved with the use of Ambien, allowing her to achieve 3 to 4 hours of solid sleep most nights. She has been using a sleep monitor for the past few days, which she believes is functioning correctly. The data from the monitor indicates that she achieved  39 minutes of deep sleep, 2.5 hours of light sleep, and 1 hour of REM sleep last night. On a previous occasion, the monitor recorded a total of 6 hours of sleep, including 1.5 hours of deep sleep and 3 hours of light sleep. She typically wakes up around 7:30 AM. She has consulted a sleep specialist in the past. She prefers to sleep during the day due to her anxiety about not sleeping at night. She does not like sleeping in complete darkness and prefers a small amount of background noise. She has been taking Ambien but feels that she may have developed a tolerance to it.    She has been experiencing a challenging week, necessitating two emergency room visits. The first visit was prompted by a sudden, intense headache, a symptom she has not previously experienced. Despite a normal CT scan, she was administered a migraine cocktail, which provided some relief. She also discovered that IV magnesium can induce flushing and hot flashes in certain individuals.    The second ER visit was due to an anxiety or panic attack, characterized by chest and back pain. She has been experiencing dizziness and fatigue over the past week, particularly when standing or walking. She has a history of anxiety and has tried various medications over the years, including Lexapro, which she believes was the most effective. She has also taken trazodone, which she thinks may have been beneficial.    Over the past two nights, she has been experiencing severe back pain, the cause of which is unknown. She has been managing the pain with a heating pad and limited movement. She describes the pain as radiating around her chest and arms, and it varies in location and intensity. After the pain subsides, she experiences a sensation akin to her muscles being squeezed. She acknowledges that her posture is not always optimal. She experienced chest and back pain yesterday evening, which lasted for 30 minutes to an hour. She reports no respiratory distress,  "visual disturbances, or numbness.    She has noticed a decrease in her appetite over the past few weeks, leading to minimal food intake. Despite this, she rarely feels hungry. She has been maintaining hydration by drinking water throughout the day and has not used electrolyte-rich water recently.    MEDICATIONS  - Current: Ambien  - Past: Lexapro, trazodone     Review of Systems   All other systems reviewed and are negative.    Objective   /78   Pulse 88   Ht 5' 1\" (1.549 m)   Wt 53.1 kg (117 lb)   LMP 01/17/2025 (Approximate)   SpO2 99%   BMI 22.11 kg/m²     Physical Exam    Physical Exam  Vitals and nursing note reviewed.   Constitutional:       Appearance: She is well-developed.   HENT:      Head: Normocephalic and atraumatic.      Right Ear: External ear normal.      Left Ear: External ear normal.      Nose: Nose normal.   Eyes:      Conjunctiva/sclera: Conjunctivae normal.      Pupils: Pupils are equal, round, and reactive to light.   Cardiovascular:      Rate and Rhythm: Normal rate and regular rhythm.      Heart sounds: Normal heart sounds. No murmur heard.  Pulmonary:      Effort: Pulmonary effort is normal.      Breath sounds: Normal breath sounds. No wheezing.   Abdominal:      General: Bowel sounds are normal.      Palpations: Abdomen is soft.   Musculoskeletal:         General: No tenderness. Normal range of motion.      Cervical back: Normal range of motion and neck supple.   Lymphadenopathy:      Cervical: No cervical adenopathy.   Skin:     General: Skin is warm and dry.      Capillary Refill: Capillary refill takes less than 2 seconds.   Neurological:      Mental Status: She is alert and oriented to person, place, and time.   Psychiatric:         Behavior: Behavior normal.         Thought Content: Thought content normal.         Judgment: Judgment normal.         "

## 2025-02-09 NOTE — ASSESSMENT & PLAN NOTE
Orders:    Sedimentation rate, automated; Future    C-reactive protein; Future    FRANC Screen w/Reflex Cascade; Future    escitalopram (Lexapro) 10 mg tablet; Take 1 tablet (10 mg total) by mouth daily    ALPRAZolam (XANAX) 1 mg tablet; Take 1 tablet (1 mg total) by mouth daily at bedtime as needed for anxiety for up to 21 days

## 2025-02-10 ENCOUNTER — RESULTS FOLLOW-UP (OUTPATIENT)
Dept: FAMILY MEDICINE CLINIC | Facility: CLINIC | Age: 35
End: 2025-02-10

## 2025-02-12 LAB
DSDNA IGG SERPL IA-ACNC: <0.9 IU/ML (ref ?–15)
NUCLEAR IGG SER IA-RTO: 0.2 RATIO (ref ?–1)

## 2025-02-22 DIAGNOSIS — G47.00 INSOMNIA, UNSPECIFIED TYPE: Primary | ICD-10-CM

## 2025-02-22 RX ORDER — ESZOPICLONE 2 MG/1
2 TABLET, FILM COATED ORAL
Qty: 14 TABLET | Refills: 0 | Status: SHIPPED | OUTPATIENT
Start: 2025-02-22

## 2025-03-02 DIAGNOSIS — F41.0 PANIC ATTACK: ICD-10-CM

## 2025-03-02 DIAGNOSIS — G47.00 INSOMNIA, UNSPECIFIED TYPE: ICD-10-CM

## 2025-03-02 DIAGNOSIS — F41.9 ANXIETY: ICD-10-CM

## 2025-03-03 RX ORDER — ESCITALOPRAM OXALATE 10 MG/1
10 TABLET ORAL DAILY
Qty: 90 TABLET | Refills: 1 | Status: SHIPPED | OUTPATIENT
Start: 2025-03-03

## 2025-03-10 ENCOUNTER — TELEMEDICINE (OUTPATIENT)
Dept: SLEEP CENTER | Facility: CLINIC | Age: 35
End: 2025-03-10
Payer: COMMERCIAL

## 2025-03-10 DIAGNOSIS — G47.20 CIRCADIAN RHYTHM SLEEP DISORDER: Primary | ICD-10-CM

## 2025-03-10 DIAGNOSIS — G47.00 INSOMNIA, UNSPECIFIED TYPE: ICD-10-CM

## 2025-03-10 PROCEDURE — 99213 OFFICE O/P EST LOW 20 MIN: CPT | Performed by: PSYCHIATRY & NEUROLOGY

## 2025-03-10 NOTE — PROGRESS NOTES
"Virtual Regular VisitName: Samantha Jaramillo      : 1990      MRN: [id number]  Encounter Provider: Ramesh Wallace MD  Encounter Date: 3/10/2025   Encounter department: [facility]  :  Assessment & Plan  Circadian rhythm sleep disorder         Insomnia, unspecified type  -Insomnia has worsened since her last visit, unclear why  -She has a prescription for Lunesta at home, recommend she try taking this closer to her bedtime (when she is typically sleepy).  Discussed she would just need to make sure she allows enough hours between taking the medication and driving)  -Asked her to fill out sleep logs and then follow-up with me  -Can consider other medications such as dual receptor Orexin antagonist  -Also could consider cognitive behavioral therapy for insomnia.  -If not covered by her insurance, can look into a digital jada for this to guide therapy as well.           History of Present Illness     HPI  Cc- things are all over the place    Since her last visit, Ms. Jaramillo has not been sleeping as well.  Was suggested to try Lexapro for anxiety.  Helps a little but may be worsening her insomnia.  No longer taking trazodone as that lost its effect and she was not sleeping any better on that medication.    Goes to bed 11 pm - 12 am (later than before)  Latency to sleep- some nights does not sleep at all; \"OK the past few days\"  Waking at 1-4 hours after falling asleep  Sleeping only 4-5 hours a night at most    No napping, not dozing   As she was not sleeping well, her primary care physician prescribed some hypnotics for her recently.    Was first prescribed zolpidem 10 mg, did not help  Then prescribed alprazolam 1 mg for 2 weeks.    More recently eszopiclone 2 mg  Tried Lunesta 2 mg, 1-2 weeks ago, did not help.      Has some anxiety as she lays down  No depression     When she cannot sleep, feels too tired to get up,  Lays in bed and tries to fall asleep  Ruminates  Sometimes frustrated " when she cannot sleep  Plays games or watches video on her phone    In the day, is out of the bedroom    Caffeine- none  Alcohol- none        Review of Systems    Objective   There were no vitals taken for this visit.    Physical Exam    Administrative Statements   Encounter provider Ramesh Wallace MD    The Patient is located at Home and in the following state in which I hold an active license [place].    The patient was identified by name and date of birth. Samantha Jaramillo was informed that this is a telemedicine visit and that the visit is being conducted through the Epic Embedded platform. She agrees to proceed..  My office door was closed. No one else was in the room.  She acknowledged consent and understanding of privacy and security of the video platform. The patient has agreed to participate and understands they can discontinue the visit at any time.    I have spent a total time of 22 minutes in caring for this patient on the day of the visit/encounter including Prognosis, Risks and benefits of tx options, Instructions for management, Patient and family education, Importance of tx compliance, Risk factor reductions, Impressions, Counseling / Coordination of care, Documenting in the medical record, Reviewing/placing orders in the medical record (including tests, medications, and/or procedures), and Obtaining or reviewing history  , not including the time spent for establishing the audio/video connection.

## 2025-03-10 NOTE — PATIENT INSTRUCTIONS
"1) Take Lunesta around 11 pm.  If doing so, avoid driving within 8 hours of taking it  2) With great difficulty falling asleep or with difficulty falling back asleep, laying in bed for a prolonged period time is not ideal.  This can increase worry and rumination (having racing thoughts, not able to \"turn off your brain\".  After what feels like 15 minutes, if you feel wide awake, worried, anxious, or can't clear your brain, it is better to leave the bedroom to do something relaxing , The typical recommendation is to read a boring book in dim light.  In general, if you leave the room, you want to do something that is relaxing, not stimulating. Avoid bright screens, doing work, doing chores, or anything that requires a lot of mental effort. Return to bed when you feel more calm, sleepy, or mentally clear.   3) Each night, a few hours before bed, try to write down your thoughts and worries.  The goal is to clear your mind so you have less to think about in the middle of the night     "

## 2025-03-10 NOTE — ASSESSMENT & PLAN NOTE
-Insomnia has worsened since her last visit, unclear why  -She has a prescription for Lunesta at home, recommend she try taking this closer to her bedtime (when she is typically sleepy).  Discussed she would just need to make sure she allows enough hours between taking the medication and driving)  -Asked her to fill out sleep logs and then follow-up with me  -Can consider other medications such as dual receptor Orexin antagonist  -Also could consider cognitive behavioral therapy for insomnia.  -If not covered by her insurance, can look into a digital jada for this to guide therapy as well.

## 2025-03-19 LAB
APOB+LDLR+PCSK9 GENE MUT ANL BLD/T: ABNORMAL
BRCA1+BRCA2 DEL+DUP + FULL MUT ANL BLD/T: NOT DETECTED
GENE DIS ANL INTERP-IMP: POSITIVE
MLH1+MSH2+MSH6+PMS2 GN DEL+DUP+FUL M: NOT DETECTED

## 2025-03-20 ENCOUNTER — RESULTS FOLLOW-UP (OUTPATIENT)
Dept: OTHER | Facility: HOSPITAL | Age: 35
End: 2025-03-20

## 2025-04-14 ENCOUNTER — APPOINTMENT (EMERGENCY)
Dept: RADIOLOGY | Facility: HOSPITAL | Age: 35
End: 2025-04-14
Payer: COMMERCIAL

## 2025-04-14 ENCOUNTER — HOSPITAL ENCOUNTER (EMERGENCY)
Facility: HOSPITAL | Age: 35
Discharge: HOME | End: 2025-04-14
Attending: EMERGENCY MEDICINE
Payer: COMMERCIAL

## 2025-04-14 VITALS
SYSTOLIC BLOOD PRESSURE: 131 MMHG | OXYGEN SATURATION: 99 % | BODY MASS INDEX: 21.9 KG/M2 | WEIGHT: 116 LBS | HEART RATE: 86 BPM | RESPIRATION RATE: 16 BRPM | TEMPERATURE: 98.5 F | HEIGHT: 61 IN | DIASTOLIC BLOOD PRESSURE: 84 MMHG

## 2025-04-14 DIAGNOSIS — R07.9 CHEST PAIN, UNSPECIFIED: Primary | ICD-10-CM

## 2025-04-14 DIAGNOSIS — R11.0 NAUSEA: ICD-10-CM

## 2025-04-14 LAB
ALBUMIN SERPL BCG-MCNC: 5 G/DL (ref 3.5–5)
ALP SERPL-CCNC: 57 U/L (ref 34–104)
ALT SERPL W P-5'-P-CCNC: 8 U/L (ref 7–52)
ANION GAP SERPL CALCULATED.3IONS-SCNC: 9 MMOL/L (ref 4–13)
AST SERPL W P-5'-P-CCNC: 16 U/L (ref 13–39)
BASOPHILS # BLD AUTO: 0.04 THOUSANDS/ÂΜL (ref 0–0.1)
BASOPHILS NFR BLD AUTO: 1 % (ref 0–1)
BILIRUB SERPL-MCNC: 0.41 MG/DL (ref 0.2–1)
BUN SERPL-MCNC: 17 MG/DL (ref 5–25)
CALCIUM SERPL-MCNC: 10.2 MG/DL (ref 8.4–10.2)
CARDIAC TROPONIN I PNL SERPL HS: <2 NG/L (ref ?–50)
CARDIAC TROPONIN I PNL SERPL HS: <2 NG/L (ref ?–50)
CHLORIDE SERPL-SCNC: 101 MMOL/L (ref 96–108)
CO2 SERPL-SCNC: 29 MMOL/L (ref 21–32)
CREAT SERPL-MCNC: 0.69 MG/DL (ref 0.6–1.3)
EOSINOPHIL # BLD AUTO: 0.08 THOUSAND/ÂΜL (ref 0–0.61)
EOSINOPHIL NFR BLD AUTO: 1 % (ref 0–6)
ERYTHROCYTE [DISTWIDTH] IN BLOOD BY AUTOMATED COUNT: 12.3 % (ref 11.6–15.1)
GFR SERPL CREATININE-BSD FRML MDRD: 114 ML/MIN/1.73SQ M
GLUCOSE SERPL-MCNC: 94 MG/DL (ref 65–140)
HCG SERPL QL: NEGATIVE
HCT VFR BLD AUTO: 43.1 % (ref 34.8–46.1)
HGB BLD-MCNC: 14.1 G/DL (ref 11.5–15.4)
IMM GRANULOCYTES # BLD AUTO: 0.02 THOUSAND/UL (ref 0–0.2)
IMM GRANULOCYTES NFR BLD AUTO: 0 % (ref 0–2)
LYMPHOCYTES # BLD AUTO: 2.34 THOUSANDS/ÂΜL (ref 0.6–4.47)
LYMPHOCYTES NFR BLD AUTO: 40 % (ref 14–44)
MCH RBC QN AUTO: 30.3 PG (ref 26.8–34.3)
MCHC RBC AUTO-ENTMCNC: 32.7 G/DL (ref 31.4–37.4)
MCV RBC AUTO: 93 FL (ref 82–98)
MONOCYTES # BLD AUTO: 0.38 THOUSAND/ÂΜL (ref 0.17–1.22)
MONOCYTES NFR BLD AUTO: 6 % (ref 4–12)
NEUTROPHILS # BLD AUTO: 3.07 THOUSANDS/ÂΜL (ref 1.85–7.62)
NEUTS SEG NFR BLD AUTO: 52 % (ref 43–75)
NRBC BLD AUTO-RTO: 0 /100 WBCS
PLATELET # BLD AUTO: 267 THOUSANDS/UL (ref 149–390)
PMV BLD AUTO: 10.6 FL (ref 8.9–12.7)
POTASSIUM SERPL-SCNC: 3.8 MMOL/L (ref 3.5–5.3)
PROT SERPL-MCNC: 7.9 G/DL (ref 6.4–8.4)
RBC # BLD AUTO: 4.66 MILLION/UL (ref 3.81–5.12)
SODIUM SERPL-SCNC: 139 MMOL/L (ref 135–147)
WBC # BLD AUTO: 5.93 THOUSAND/UL (ref 4.31–10.16)

## 2025-04-14 PROCEDURE — 93005 ELECTROCARDIOGRAM TRACING: CPT

## 2025-04-14 PROCEDURE — 96374 THER/PROPH/DIAG INJ IV PUSH: CPT

## 2025-04-14 PROCEDURE — 84703 CHORIONIC GONADOTROPIN ASSAY: CPT | Performed by: EMERGENCY MEDICINE

## 2025-04-14 PROCEDURE — 84484 ASSAY OF TROPONIN QUANT: CPT | Performed by: EMERGENCY MEDICINE

## 2025-04-14 PROCEDURE — 99285 EMERGENCY DEPT VISIT HI MDM: CPT

## 2025-04-14 PROCEDURE — 71045 X-RAY EXAM CHEST 1 VIEW: CPT

## 2025-04-14 PROCEDURE — 96361 HYDRATE IV INFUSION ADD-ON: CPT

## 2025-04-14 PROCEDURE — 85025 COMPLETE CBC W/AUTO DIFF WBC: CPT | Performed by: EMERGENCY MEDICINE

## 2025-04-14 PROCEDURE — 80053 COMPREHEN METABOLIC PANEL: CPT | Performed by: EMERGENCY MEDICINE

## 2025-04-14 PROCEDURE — 36415 COLL VENOUS BLD VENIPUNCTURE: CPT | Performed by: EMERGENCY MEDICINE

## 2025-04-14 PROCEDURE — 99285 EMERGENCY DEPT VISIT HI MDM: CPT | Performed by: EMERGENCY MEDICINE

## 2025-04-14 RX ORDER — ONDANSETRON 2 MG/ML
4 INJECTION INTRAMUSCULAR; INTRAVENOUS ONCE
Status: COMPLETED | OUTPATIENT
Start: 2025-04-14 | End: 2025-04-14

## 2025-04-14 RX ADMIN — SODIUM CHLORIDE 1000 ML: 0.9 INJECTION, SOLUTION INTRAVENOUS at 11:51

## 2025-04-14 RX ADMIN — ONDANSETRON 4 MG: 2 INJECTION INTRAMUSCULAR; INTRAVENOUS at 11:51

## 2025-04-14 NOTE — ED PROVIDER NOTES
Time reflects when diagnosis was documented in both MDM as applicable and the Disposition within this note       Time User Action Codes Description Comment    4/14/2025  2:40 PM Chauncey Chase [R07.9] Chest pain, unspecified     4/14/2025  2:41 PM Chauncey Chase [R11.0] Nausea           ED Disposition       ED Disposition   Discharge    Condition   Stable    Date/Time   Mon Apr 14, 2025  2:40 PM    Comment   Samantha Jaramillo discharge to home/self care.                   Assessment & Plan       Medical Decision Making  34-year-old female presented to the emergency department for evaluation of chest pain.  On arrival patient awake, alert and in no acute distress.  Vital signs within normal limits.  EKG ordered to evaluate for acute ischemia/arrhythmia.  EKG on my independent interpretation with a sinus rhythm with no acute ischemic changes.  Chest x-ray ordered to evaluate for acute cardiopulmonary process including but not limited to pneumonia, pneumothorax, edema, effusion.  Chest x-ray on my independent interpretation with no acute findings.  Patient treated symptomatically with a fluid bolus and Zofran.  On reevaluation the patient reported that her symptoms had resolved.  Blood work grossly unremarkable including a delta troponin within normal limits.  All diagnostic studies were discussed with the patient in detail.  She is appropriate for discharge.  Recommendation was made for the patient to follow-up with her PCP for continued symptoms.  Return precautions were discussed.    The patient (and/or family present) agrees with the plan for discharge and feels comfortable to go home with proper follow-up. Diagnostic tests were reviewed. Diagnosis, care plan and treatment options were discussed. All questions were answered prior to discharge. I considered the patient's other medical conditions as applicable/noted above in my medical decision making. Advised the patient to return for worsening or  additional problems. The patient (and/or family present) verbalized understanding of the discharge instructions and warnings that would necessitate return to the Emergency Department.          Amount and/or Complexity of Data Reviewed  External Data Reviewed: labs, radiology and ECG.     Details: Prior labs, imaging and EKG reviewed  Labs: ordered. Decision-making details documented in ED Course.  Radiology: ordered and independent interpretation performed.  ECG/medicine tests: ordered and independent interpretation performed.    Risk  Prescription drug management.        ED Course as of 04/14/25 1604   Mon Apr 14, 2025   1204 WBC: 5.93   1204 Hemoglobin: 14.1       Medications   sodium chloride 0.9 % bolus 1,000 mL (0 mL Intravenous Stopped 4/14/25 1255)   ondansetron (ZOFRAN) injection 4 mg (4 mg Intravenous Given 4/14/25 1151)       ED Risk Strat Scores   HEART Risk Score      Flowsheet Row Most Recent Value   Heart Score Risk Calculator    History 1 Filed at: 04/14/2025 1232   ECG 0 Filed at: 04/14/2025 1232   Age 0 Filed at: 04/14/2025 1232   Risk Factors 0 Filed at: 04/14/2025 1232   Troponin 0 Filed at: 04/14/2025 1232   HEART Score 1 Filed at: 04/14/2025 1232          HEART Risk Score      Flowsheet Row Most Recent Value   Heart Score Risk Calculator    History 1 Filed at: 04/14/2025 1232   ECG 0 Filed at: 04/14/2025 1232   Age 0 Filed at: 04/14/2025 1232   Risk Factors 0 Filed at: 04/14/2025 1232   Troponin 0 Filed at: 04/14/2025 1232   HEART Score 1 Filed at: 04/14/2025 1232                      No data recorded    PERC Rule for PE      Flowsheet Row Most Recent Value   PERC Rule for PE    Age >=50 0 Filed at: 04/14/2025 1232   HR >=100 0 Filed at: 04/14/2025 1232   O2 Sat on room air < 95% 0 Filed at: 04/14/2025 1232   History of PE or DVT 0 Filed at: 04/14/2025 1232   Recent trauma or surgery 0 Filed at: 04/14/2025 1232   Hemoptysis 0 Filed at: 04/14/2025 1232   Exogenous estrogen 0 Filed at: 04/14/2025  1232   Unilateral leg swelling 0 Filed at: 04/14/2025 1232   PERC Rule for PE Results 0 Filed at: 04/14/2025 1232                Wells' Criteria for PE      Flowsheet Row Most Recent Value   Wells' Criteria for PE    Clinical signs and symptoms of DVT 0 Filed at: 04/14/2025 1232   PE is primary diagnosis or equally likely 0 Filed at: 04/14/2025 1232   HR >100 0 Filed at: 04/14/2025 1232   Immobilization at least 3 days or Surgery in the previous 4 weeks 0 Filed at: 04/14/2025 1232   Previous, objectively diagnosed PE or DVT 0 Filed at: 04/14/2025 1232   Hemoptysis 0 Filed at: 04/14/2025 1232   Malignancy with treatment within 6 months or palliative 0 Filed at: 04/14/2025 1232   James' Criteria Total 0 Filed at: 04/14/2025 1232                        History of Present Illness       Chief Complaint   Patient presents with    Chest Pain     X 1 hour prior to arrival with hot flashes and nausea x 1 hour; mid to left pain, radiating to the back       Past Medical History:   Diagnosis Date    Iron deficiency       Past Surgical History:   Procedure Laterality Date    [place] LAPAROSCOPY W/RMVL ADNEXAL STRUCTURES Bilateral 9/1/2020    Procedure: LAPAROSCOPIC SALPINGECTOMY;  Surgeon: Jodi Moon DO;  Location: [facility];  Service: Gynecology    TUBAL LIGATION      WISDOM TOOTH EXTRACTION        Family History   Problem Relation Age of Onset    Hypertension Mother     Endometrial cancer Mother     Hyperlipidemia Mother     Stroke Mother     Hypertension Father     Autism Brother     Other Paternal Grandmother         lymes    No Known Problems Paternal Grandfather     No Known Problems Half-Brother     No Known Problems Half-Brother       Social History     Tobacco Use    Smoking status: Never    Smokeless tobacco: Never   Vaping Use    Vaping status: Never Used   Substance Use Topics    Alcohol use: Not Currently     Comment: occasionally     Drug use: Never      E-Cigarette/Vaping    E-Cigarette Use Never User        E-Cigarette/Vaping Substances    Nicotine No     THC No     CBD No     Flavoring No     Other No     Unknown No       I have reviewed and agree with the history as documented.     34-year-old female presents to the emergency department for evaluation of chest pain.  The patient reports that approximately 1 hour prior to arrival she developed chest pain that she describes as a nonexertional discomfort with radiation to her back.  She states that the pain is mild.  It is not a ripping or tearing sensation.  She reports associated hot flashes and nausea.  She did not take any medications to treat her symptoms prior to arrival.  No fevers, chills, vomiting, diarrhea, sick contacts or recent travel.  No lower leg pain or swelling.  No shortness of breath.  No localized numbness, tingling or weakness.        Review of Systems   Constitutional:  Negative for chills and fever.   HENT:  Negative for ear pain and sore throat.    Eyes:  Negative for pain and visual disturbance.   Respiratory:  Negative for cough and shortness of breath.    Cardiovascular:  Positive for chest pain. Negative for palpitations.   Gastrointestinal:  Positive for nausea. Negative for abdominal pain and vomiting.   Genitourinary:  Negative for dysuria and hematuria.   Musculoskeletal:  Negative for arthralgias and back pain.   Skin:  Negative for color change and rash.   Neurological:  Negative for seizures and syncope.   All other systems reviewed and are negative.          Objective       ED Triage Vitals [04/14/25 1138]   Temperature Pulse Blood Pressure Respirations SpO2 Patient Position - Orthostatic VS   98.5 °F (36.9 °C) 86 131/84 16 99 % Lying      Temp Source Heart Rate Source BP Location FiO2 (%) Pain Score    Oral Monitor Left arm -- --      Vitals      Date and Time Temp Pulse SpO2 Resp BP Pain Score FACES Pain Rating User   04/14/25 1138 98.5 °F (36.9 °C) 86 99 % 16 131/84 -- -- ALG            Physical Exam  Vitals and nursing note  reviewed.   Constitutional:       General: She is not in acute distress.     Appearance: She is well-developed.   HENT:      Head: Normocephalic and atraumatic.   Eyes:      Conjunctiva/sclera: Conjunctivae normal.   Cardiovascular:      Rate and Rhythm: Normal rate and regular rhythm.      Pulses:           Radial pulses are 2+ on the right side and 2+ on the left side.        Dorsalis pedis pulses are 2+ on the right side and 2+ on the left side.      Heart sounds: No murmur heard.  Pulmonary:      Effort: Pulmonary effort is normal. No respiratory distress.      Breath sounds: Normal breath sounds.   Abdominal:      Palpations: Abdomen is soft.      Tenderness: There is no abdominal tenderness.   Musculoskeletal:         General: No swelling.      Cervical back: Neck supple.      Right lower leg: No edema.      Left lower leg: No edema.   Skin:     General: Skin is warm and dry.      Capillary Refill: Capillary refill takes less than 2 seconds.   Neurological:      Mental Status: She is alert.   Psychiatric:         Mood and Affect: Mood normal.         Results Reviewed       Procedure Component Value Units Date/Time    HS Troponin I 2hr [[id number]] Collected: 04/14/25 1358    Lab Status: Final result Specimen: Blood from Arm, Right Updated: 04/14/25 1424     hs TnI 2hr <2 ng/L      Delta 2hr hsTnI --    hCG, qualitative pregnancy [[id number]]  (Normal) Collected: 04/14/25 1150    Lab Status: Final result Specimen: Blood from Arm, Right Updated: 04/14/25 1222     Preg, Serum Negative    HS Troponin 0hr (reflex protocol) [[id number]]  (Normal) Collected: 04/14/25 1150    Lab Status: Final result Specimen: Blood from Arm, Right Updated: 04/14/25 1221     hs TnI 0hr <2 ng/L     Comprehensive metabolic panel [[id number]] Collected: 04/14/25 1150    Lab Status: Final result Specimen: Blood from Arm, Right Updated: 04/14/25 1219     Sodium 139 mmol/L      Potassium 3.8 mmol/L      Chloride 101 mmol/L      CO2 29 mmol/L       ANION GAP 9 mmol/L      BUN 17 mg/dL      Creatinine 0.69 mg/dL      Glucose 94 mg/dL      Calcium 10.2 mg/dL      AST 16 U/L      ALT 8 U/L      Alkaline Phosphatase 57 U/L      Total Protein 7.9 g/dL      Albumin 5.0 g/dL      Total Bilirubin 0.41 mg/dL      eGFR 114 ml/min/1.73sq m     Narrative:      National Kidney Disease Foundation guidelines for Chronic Kidney Disease (CKD):     Stage 1 with normal or high GFR (GFR > 90 mL/min/1.73 square meters)    Stage 2 Mild CKD (GFR = 60-89 mL/min/1.73 square meters)    Stage 3A Moderate CKD (GFR = 45-59 mL/min/1.73 square meters)    Stage 3B Moderate CKD (GFR = 30-44 mL/min/1.73 square meters)    Stage 4 Severe CKD (GFR = 15-29 mL/min/1.73 square meters)    Stage 5 End Stage CKD (GFR <15 mL/min/1.73 square meters)  Note: GFR calculation is accurate only with a steady state creatinine    CBC and differential [[id number]] Collected: 04/14/25 1150    Lab Status: Final result Specimen: Blood from Arm, Right Updated: 04/14/25 1158     WBC 5.93 Thousand/uL      RBC 4.66 Million/uL      Hemoglobin 14.1 g/dL      Hematocrit 43.1 %      MCV 93 fL      MCH 30.3 pg      MCHC 32.7 g/dL      RDW 12.3 %      MPV 10.6 fL      Platelets 267 Thousands/uL      nRBC 0 /100 WBCs      Segmented % 52 %      Immature Grans % 0 %      Lymphocytes % 40 %      Monocytes % 6 %      Eosinophils Relative 1 %      Basophils Relative 1 %      Absolute Neutrophils 3.07 Thousands/µL      Absolute Immature Grans 0.02 Thousand/uL      Absolute Lymphocytes 2.34 Thousands/µL      Absolute Monocytes 0.38 Thousand/µL      Eosinophils Absolute 0.08 Thousand/µL      Basophils Absolute 0.04 Thousands/µL             XR chest 1 view portable   ED Interpretation by Chauncey Chase MD (04/14 1235)   No focal consolidation.  No pneumothorax or effusion.      Final Interpretation by Pepe Sanchez MD (04/14 0821)      No acute cardiopulmonary disease.            Workstation performed: [id number]              ECG 12 Lead Documentation Only    Date/Time: 4/14/2025 11:47 AM    Performed by: Chauncey Chase MD  Authorized by: Chauncey Chase MD    ECG reviewed by me, the ED Provider: yes    Patient location:  ED  Previous ECG:     Previous ECG:  Compared to current    Similarity:  No change    Comparison to cardiac monitor: Yes    Interpretation:     Interpretation: normal    Rate:     ECG rate assessment: normal    Rhythm:     Rhythm: sinus rhythm    Ectopy:     Ectopy: none    QRS:     QRS axis:  Normal  Conduction:     Conduction: normal    ST segments:     ST segments:  Normal  T waves:     T waves: normal        ED Medication and Procedure Management   Prior to Admission Medications   Prescriptions Last Dose Informant Patient Reported? Taking?   ALPRAZolam (XANAX) 1 mg tablet   No No   Sig: Take 1 tablet (1 mg total) by mouth daily at bedtime as needed for anxiety for up to 21 days   escitalopram (LEXAPRO) 10 mg tablet   No No   Sig: TAKE 1 TABLET BY MOUTH EVERY DAY   eszopiclone (LUNESTA) 2 mg tablet   No No   Sig: Take 1 tablet (2 mg total) by mouth daily at bedtime as needed for sleep Take immediately before bedtime   ferrous sulfate 324 (65 Fe) mg  Self No No   Sig: TAKE 1 TABLET (324 MG TOTAL) BY MOUTH 2 (TWO) TIMES A DAY BEFORE MEALS   zolpidem (AMBIEN) 10 mg tablet   No No   Sig: Take 1 tablet (10 mg total) by mouth daily at bedtime for 14 days      Facility-Administered Medications: None     Discharge Medication List as of 4/14/2025  2:41 PM        CONTINUE these medications which have NOT CHANGED    Details   ALPRAZolam (XANAX) 1 mg tablet Take 1 tablet (1 mg total) by mouth daily at bedtime as needed for anxiety for up to 21 days, Starting Fri 2/7/2025, Until Fri 2/28/2025 at 2359, Normal      escitalopram (LEXAPRO) 10 mg tablet TAKE 1 TABLET BY MOUTH EVERY DAY, Starting Mon 3/3/2025, Normal      eszopiclone (LUNESTA) 2 mg tablet Take 1 tablet (2 mg total) by mouth daily at bedtime as needed  for sleep Take immediately before bedtime, Starting Sat 2/22/2025, Normal      ferrous sulfate 324 (65 Fe) mg TAKE 1 TABLET (324 MG TOTAL) BY MOUTH 2 (TWO) TIMES A DAY BEFORE MEALS, Starting Tue 6/15/2021, Normal      zolpidem (AMBIEN) 10 mg tablet Take 1 tablet (10 mg total) by mouth daily at bedtime for 14 days, Starting Wed 2/5/2025, Until Wed 2/19/2025, Normal           No discharge procedures on file.  ED SEPSIS DOCUMENTATION   Time reflects when diagnosis was documented in both MDM as applicable and the Disposition within this note       Time User Action Codes Description Comment    4/14/2025  2:40 PM Chauncey Chase [R07.9] Chest pain, unspecified     4/14/2025  2:41 PM Chauncey Chase [R11.0] Nausea                  Chauncey Chase MD  04/14/25 1495

## 2025-04-15 LAB
ATRIAL RATE: 83 BPM
P AXIS: 86 DEGREES
PR INTERVAL: 130 MS
QRS AXIS: 83 DEGREES
QRSD INTERVAL: 82 MS
QT INTERVAL: 354 MS
QTC INTERVAL: 415 MS
T WAVE AXIS: 78 DEGREES
VENTRICULAR RATE: 83 BPM

## 2025-04-15 PROCEDURE — 93010 ELECTROCARDIOGRAM REPORT: CPT | Performed by: INTERNAL MEDICINE

## 2025-04-18 ENCOUNTER — TELEPHONE (OUTPATIENT)
Dept: OTHER | Facility: HOSPITAL | Age: 35
End: 2025-04-18

## 2025-04-18 ENCOUNTER — ANNUAL EXAM (OUTPATIENT)
Dept: OBGYN CLINIC | Facility: CLINIC | Age: 35
End: 2025-04-18
Payer: COMMERCIAL

## 2025-04-18 VITALS
HEIGHT: 61 IN | WEIGHT: 116.2 LBS | SYSTOLIC BLOOD PRESSURE: 110 MMHG | BODY MASS INDEX: 21.94 KG/M2 | DIASTOLIC BLOOD PRESSURE: 66 MMHG

## 2025-04-18 DIAGNOSIS — Z12.4 ENCOUNTER FOR SCREENING FOR MALIGNANT NEOPLASM OF CERVIX: ICD-10-CM

## 2025-04-18 DIAGNOSIS — Z01.419 WELL WOMAN EXAM WITH ROUTINE GYNECOLOGICAL EXAM: Primary | ICD-10-CM

## 2025-04-18 DIAGNOSIS — Z23 NEED FOR HPV VACCINE: ICD-10-CM

## 2025-04-18 DIAGNOSIS — Z11.51 SCREENING FOR HPV (HUMAN PAPILLOMAVIRUS): ICD-10-CM

## 2025-04-18 PROCEDURE — 90651 9VHPV VACCINE 2/3 DOSE IM: CPT | Performed by: OBSTETRICS & GYNECOLOGY

## 2025-04-18 PROCEDURE — 99385 PREV VISIT NEW AGE 18-39: CPT | Performed by: OBSTETRICS & GYNECOLOGY

## 2025-04-18 PROCEDURE — G0476 HPV COMBO ASSAY CA SCREEN: HCPCS | Performed by: OBSTETRICS & GYNECOLOGY

## 2025-04-18 PROCEDURE — 90471 IMMUNIZATION ADMIN: CPT | Performed by: OBSTETRICS & GYNECOLOGY

## 2025-04-18 PROCEDURE — G0145 SCR C/V CYTO,THINLAYER,RESCR: HCPCS | Performed by: OBSTETRICS & GYNECOLOGY

## 2025-04-18 NOTE — TELEPHONE ENCOUNTER
4/18/2025: A certified letter was sent to Samantha regarding the [employer] study. Certified letter number: [id number] [phone]

## 2025-04-18 NOTE — PROGRESS NOTES
ASSESSMENT & PLAN:   Diagnoses and all orders for this visit:    Well woman exam with routine gynecological exam  -     Liquid-based pap, screening  -     HPV VACCINE 9 VALENT IM    Encounter for screening for malignant neoplasm of cervix  -     Liquid-based pap, screening    Screening for HPV (human papillomavirus)  -     Liquid-based pap, screening    Need for HPV vaccine  -     HPV VACCINE 9 VALENT IM          The following were reviewed in today's visit: ASCCP guidelines, Gardasil vaccination, STD testing exercise.    Patient to return to office in yearly for annual exam.     All questions have been answered to her satisfaction.        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  (pap 2020 wnl)       HPI: Samantha Jaramillo is a 34 y.o.  who presents for annual gynecologic examination.  She has the following concerns: I saw so you are all good on that front      Health Maintenance:    Exercise: occasionally  Breast exams/breast awareness: yes    Past Medical History:   Diagnosis Date    Anxiety     Iron deficiency     Migraine 26       Past Surgical History:   Procedure Laterality Date    [place] LAPAROSCOPY W/RMVL ADNEXAL STRUCTURES Bilateral 2020    Procedure: LAPAROSCOPIC SALPINGECTOMY;  Surgeon: Jodi Moon DO;  Location: [facility];  Service: Gynecology    TUBAL LIGATION      WISDOM TOOTH EXTRACTION         Past OB/Gyn History:  Period Cycle (Days): 25  Period Duration (Days): 5  Period Pattern: Regular  Menstrual Flow: Moderate, Heavy  Menstrual Control: Panty liner, Thin pad, Maxi pad, Other (Comment) (period underwear)  Dysmenorrhea: (!) Moderate  Dysmenorrhea Symptoms: Cramping, HeadachePatient's last menstrual period was 2025 (approximate).    Last Pap: 3/19/2020 : no abnormalities  History of abnormal Pap smear: no  HPV vaccine completed: missing one dose, will  administer today    Patient is not currently sexually active.   STD testing: no  Current contraception:Sterilization - tubal ligation      Family History  Family History   Problem Relation Age of Onset    Hypertension Mother     Endometrial cancer Mother     Hyperlipidemia Mother     Stroke Mother     Cancer Mother     Transient ischemic attack Mother     Hypertension Father     Heart attack Father     Autism Brother     Asthma Brother     Other Paternal Grandmother         lymes    No Known Problems Paternal Grandfather     No Known Problems Half-Brother     No Known Problems Half-Brother        Family history of uterine or ovarian cancer: no  Family history of breast cancer: no  Family history of colon cancer: no    Social History:  Social History     Socioeconomic History    Marital status: Single     Spouse name: Not on file    Number of children: Not on file    Years of education: 12    Highest education level: Not on file   Occupational History    Occupation:    Tobacco Use    Smoking status: Never     Passive exposure: Never    Smokeless tobacco: Never   Vaping Use    Vaping status: Never Used   Substance and Sexual Activity    Alcohol use: Not Currently     Comment: occasionally     Drug use: Never    Sexual activity: Not Currently     Partners: Male     Birth control/protection: Female Sterilization   Other Topics Concern    Not on file   Social History Narrative    Most recent tobacco use screening: 10-    Do you currently or have you served in the [employer]: No    Were you activated, into active duty, as a member of the [employer] or as a Reservist: No    Occupation: [employer]    Education: 12    student college    Marital status: Single    Sexual orientation: Heterosexual    Exercise level: Occasional    Diet: Regular    General stress level: Medium    Alcohol intake: Occasional    Caffeine intake: Occasional    Chewing tobacco: none    Illicit drugs: none    Guns present in  "home: No    Seat belts used routinely: Yes    Sunscreen used routinely: No    Smoke alarm in home: Yes    Advance directive: No    Salt Intake: moderate    Has the Patient had a mammogram to screen for breast cancer within 24 months: No    Would the patient like to s     Social Drivers of Health     Financial Resource Strain: Not on file   Food Insecurity: Not on file   Transportation Needs: Not on file   Physical Activity: Not on file   Stress: Not on file   Social Connections: Not on file   Intimate Partner Violence: Not on file   Housing Stability: Not on file     Domestic violence screen: negative    Allergies:  No Known Allergies    Medications:    Current Outpatient Medications:     ferrous sulfate 324 (65 Fe) mg, TAKE 1 TABLET (324 MG TOTAL) BY MOUTH 2 (TWO) TIMES A DAY BEFORE MEALS, Disp: 180 tablet, Rfl: 1    ALPRAZolam (XANAX) 1 mg tablet, Take 1 tablet (1 mg total) by mouth daily at bedtime as needed for anxiety for up to 21 days (Patient not taking: Reported on 4/18/2025), Disp: 21 tablet, Rfl: 0    escitalopram (LEXAPRO) 10 mg tablet, TAKE 1 TABLET BY MOUTH EVERY DAY (Patient not taking: Reported on 4/18/2025), Disp: 90 tablet, Rfl: 1    eszopiclone (LUNESTA) 2 mg tablet, Take 1 tablet (2 mg total) by mouth daily at bedtime as needed for sleep Take immediately before bedtime (Patient not taking: Reported on 4/18/2025), Disp: 14 tablet, Rfl: 0    zolpidem (AMBIEN) 10 mg tablet, Take 1 tablet (10 mg total) by mouth daily at bedtime for 14 days, Disp: 14 tablet, Rfl: 0    Review of Systems:  Review of Systems      Physical Exam:  /66 (BP Location: Left arm, Patient Position: Sitting, Cuff Size: Standard)   Ht 5' 1\" (1.549 m)   Wt 52.7 kg (116 lb 3.2 oz)   LMP 03/23/2025 (Approximate)   BMI 21.96 kg/m²    Physical Exam  Constitutional:       Appearance: Normal appearance.   Genitourinary:      Right Labia: No rash, lesions or skin changes.     Left Labia: No lesions, skin changes or rash.       " Right Adnexa: not tender and no mass present.     Left Adnexa: not tender and no mass present.     No cervical motion tenderness, discharge or lesion.      Uterus is not enlarged or tender.   Breasts:     Right: No mass, nipple discharge, skin change or tenderness.      Left: No mass, nipple discharge, skin change or tenderness.   HENT:      Head: Normocephalic and atraumatic.   Eyes:      Extraocular Movements: Extraocular movements intact.   Cardiovascular:      Rate and Rhythm: Normal rate and regular rhythm.   Pulmonary:      Effort: Pulmonary effort is normal.      Breath sounds: Normal breath sounds.   Abdominal:      General: There is no distension.      Palpations: Abdomen is soft.      Tenderness: There is no abdominal tenderness. There is no guarding.   Musculoskeletal:         General: Normal range of motion.   Neurological:      Mental Status: She is alert. Mental status is at baseline.   Skin:     General: Skin is warm and dry.   Psychiatric:         Mood and Affect: Mood normal.         Behavior: Behavior normal.

## 2025-04-21 LAB
HPV HR 12 DNA CVX QL NAA+PROBE: NEGATIVE
HPV16 DNA CVX QL NAA+PROBE: NEGATIVE
HPV18 DNA CVX QL NAA+PROBE: NEGATIVE

## 2025-04-23 ENCOUNTER — RESULTS FOLLOW-UP (OUTPATIENT)
Dept: OBGYN CLINIC | Facility: CLINIC | Age: 35
End: 2025-04-23

## 2025-04-23 LAB
LAB AP GYN PRIMARY INTERPRETATION: NORMAL
Lab: NORMAL

## 2025-05-02 DIAGNOSIS — G47.00 INSOMNIA, UNSPECIFIED TYPE: ICD-10-CM

## 2025-05-02 RX ORDER — ESZOPICLONE 2 MG/1
2 TABLET, FILM COATED ORAL
Qty: 14 TABLET | Refills: 0 | Status: SHIPPED | OUTPATIENT
Start: 2025-05-02

## 2025-05-02 NOTE — TELEPHONE ENCOUNTER
Patient called to request a refill for their Lunesta advised a refill was requested on 5/2/2025 and is pending approval. Patient verbalized understanding and is in agreement.     Does the patient have enough for 3 days?   [] Yes   [x] No - Send as HP to POD

## 2025-05-22 DIAGNOSIS — E78.01: Primary | ICD-10-CM

## 2025-05-22 DIAGNOSIS — G47.00 INSOMNIA, UNSPECIFIED TYPE: ICD-10-CM

## 2025-05-22 RX ORDER — ROSUVASTATIN CALCIUM 10 MG/1
10 TABLET, COATED ORAL DAILY
Qty: 100 TABLET | Refills: 3 | Status: SHIPPED | OUTPATIENT
Start: 2025-05-22

## 2025-05-22 RX ORDER — ESZOPICLONE 2 MG/1
2 TABLET, FILM COATED ORAL
Qty: 30 TABLET | Refills: 0 | Status: SHIPPED | OUTPATIENT
Start: 2025-05-22

## 2025-06-15 DIAGNOSIS — G47.00 INSOMNIA, UNSPECIFIED TYPE: ICD-10-CM

## 2025-06-16 DIAGNOSIS — G47.00 INSOMNIA, UNSPECIFIED TYPE: ICD-10-CM

## 2025-06-16 RX ORDER — ESZOPICLONE 2 MG/1
2 TABLET, FILM COATED ORAL
Qty: 30 TABLET | Refills: 5 | Status: SHIPPED | OUTPATIENT
Start: 2025-06-16

## 2025-06-16 RX ORDER — ESZOPICLONE 2 MG/1
2 TABLET, FILM COATED ORAL
Qty: 30 TABLET | Refills: 0 | OUTPATIENT
Start: 2025-06-16

## 2025-06-16 NOTE — TELEPHONE ENCOUNTER
Reason for call:   [x] Refill   [] Prior Auth  [x] Other: out of medication     Office:   [x] PCP/Provider - Jonathan Joseph, / Duane cary   [] Specialty/Provider -     Medication:     eszopiclone (LUNESTA) 2 mg tablet       Dose/Frequency:  Take 1 tablet (2 mg total) by mouth daily at bedtime as needed for sleep Take immediately before bedtime,     Quantity: 30    Pharmacy: [facility]/pharmacy #[id number] - [place] - [address].      Local Pharmacy   Does the patient have enough for 3 days?   [] Yes   [x] No - Send as HP to POD

## 2025-06-17 NOTE — TELEPHONE ENCOUNTER
Patient calling because pharmacy received script but is reporting that its too soon to fill. Patient states there were a few nights where she had to take 2 pills instead of 1, but her doctor said was okay if she needed to do that occasionally. Can early refill be authorized? Or updated script sent to pharmacy to account for the couple of times she may need to take an extra?

## 2025-06-26 ENCOUNTER — OFFICE VISIT (OUTPATIENT)
Dept: SLEEP CENTER | Facility: CLINIC | Age: 35
End: 2025-06-26
Payer: COMMERCIAL

## 2025-06-26 VITALS
HEIGHT: 61 IN | OXYGEN SATURATION: 98 % | RESPIRATION RATE: 18 BRPM | WEIGHT: 109 LBS | SYSTOLIC BLOOD PRESSURE: 118 MMHG | HEART RATE: 88 BPM | BODY MASS INDEX: 20.58 KG/M2 | DIASTOLIC BLOOD PRESSURE: 80 MMHG

## 2025-06-26 DIAGNOSIS — F41.9 ANXIETY: ICD-10-CM

## 2025-06-26 DIAGNOSIS — G47.00 INSOMNIA, UNSPECIFIED TYPE: Primary | ICD-10-CM

## 2025-06-26 DIAGNOSIS — E61.1 IRON DEFICIENCY: ICD-10-CM

## 2025-06-26 DIAGNOSIS — G47.20 CIRCADIAN RHYTHM SLEEP DISORDER: ICD-10-CM

## 2025-06-26 PROCEDURE — 99215 OFFICE O/P EST HI 40 MIN: CPT | Performed by: INTERNAL MEDICINE

## 2025-06-26 NOTE — PATIENT INSTRUCTIONS
Strategies for improving sleep:    Keep a consistent bedtime and wake up time.  This will lead to a more regular sleep schedule and avoid periods of sleep deprivation or periods of extended wakefulness during the night.    Sleep in a colder environment.  Bedroom temperatures may need to be below 70 degrees Fahrenheit to help with decreasing body temperature.  The brain usually calms in colder temperatures.  Taking a hot shower/bath before bedtime may help with decreasing internal body temperature.      Avoid watching TV in bed.  If needing a form of media to help with sleep - can try sleep aid podcasts such Sleep With Me - a free podcast that helps with sleep initiation    Avoid napping, especially naps lasting longer than 1 hour or naps late in the day, which will likely affect your ability to fall asleep that night.    Limit caffeine/sugar, avoiding caffeine after lunch to allow it to get out of your system and not affect your ability to fall asleep or the quality of your sleep.  I usually recommend avoiding caffeine/sugar at least 6 hours before bedstime    Limit alcohol, alcohol can be sedating but also activating as it metabolizes, causing you to awaken from sleep earlier than desired.  It can affect the quality of your sleep by not letting you get into the more refreshing stages of sleep.    Avoid nicotine, of course not smoking or vaping at all is best, but nicotine is a stimulant and should be avoided near bedtime and during the night    Exercise and daytime physical activity is encouraged, in particular 4-6 hours before bedtime, as this may help you to fall asleep more easily and quality of sleep is improved.  Rigorous exercise within 3 hours of bedtime is discouraged.    Keep the sleep environment quiet and dark - Noise and light exposure during the night can disrupt sleep.  White noise or ear plugs are often recommended to reduce noise.  Using black out shades or an eye mask is commonly recommended to  "reduce light.  This also includes avoiding exposure to television or technology near bedtime, as this can have an impact on circadian rhythms by shifting sleep time later.    Bedroom clock - Avoid checking the time at night  This includes alarm clocks and other time pieces such as watches and phones.  Checking the time increases cognitive arousal and prolongs wakefulness.    Evening eating - Avoid eating close to bedtime which can cause acid reflux and unwanted weight gain, but don't go to bed hungry.  Eat a healthy and filling meal in the evening without over-eating and avoid late night snacks.    Be mindful of your medications - some medications if taken closer to bedtime can cause insomnia.  These medications include Effexor, Cymbalta, Wellbutrin, Metoprolol, Albuterol and others.  Of course, medications are important for other medical issues so a discussion needs to be had with your prescribing physician before changing medications on your own.     Avoid excessive time awake in bed:  Before going to bed, decreasing stressful thoughts is key to quieting the brain.  That's easier than done.  Some strategies to decrease these thoughts include a typical wind down routine (reading a book, hot shower/bath, calming TV show in the living room etc.), specified worry time (writing down your worries in a journal 2-3 hours before bed), progressive muscle relaxation ([employer] has some good tutorials on insomnia muscle relaxation), meditation.      With great difficulty falling asleep or with difficulty falling back asleep, laying in bed for a prolonged period time is not ideal.  This can increase worry and rumination (having racing thoughts, not able to \"turn off your brain\".  During the first part of the night - avoid going to bed unless you are drowsy.  Sometimes we go to bed because we feel like it's the \"right time\" but our brains aren't ready for sleeping.  This may paradoxically lead to more insomnia as you stay awake " in bed waiting to go to sleep.  If you are able to fall asleep under 30 minutes of closing your eyes with the lights off, that is reasonable.  If not, then maybe you need to get out of bed.  After what feels like 30 minutes, if you feel wide awake, worried, anxious, or can't clear your brain, it is better to leave the bedroom to do something relaxing , The typical recommendation is to read a boring book in dim light.  In general, if you leave the room, you want to do something that is relaxing, not stimulating. Avoid bright screens, doing work, doing chores, or anything that requires a lot of mental effort. Return to bed when you feel more calm, sleepy, or mentally clear.      It is common in insomnia to look at the time at night to see what time it is, how long you have been awake, etc.  However, this can negatively affect sleep. I strongly recommend avoiding looking at the time at night.  Here are some ways to do this  1) Turn the clock around so you cannot see the time at night  2) Avoid wearing a watch to bed.    3) Leave your phone on the other side of the room so you cannot look at it at night  4) Set an alarm if you need to wake up in he morning. If you have not heard the alarm, assume it is still time to sleep.    If you practice this consistently, sleep quality and anxiety regarding sleep may improve.    Best Foods for Sleep  ? Foods High in Tryptophan (Boosts Melatonin & Serotonin)  Turkey, chicken, or fish (especially salmon)  Eggs  Dairy products (milk, yogurt, cheese)  Nuts & seeds (almonds, walnuts, pumpkin seeds)    ? Magnesium-Rich Foods (Relaxes Muscles & Reduces Stress)  Bananas  Dark leafy greens (spinach, kale)  Avocados  Whole grains (quinoa, brown rice, oats)    ? Melatonin-Boosting Foods (Regulates Sleep-Wake Cycle)  Tart cherries or cherry juice  Grapes  Tomatoes  Walnuts    ? Complex Carbohydrates (Stabilizes Blood Sugar & Supports Serotonin Production)  Sweet potatoes  Whole-grain bread or  "crackers  Oatmeal    ? Herbal Teas (Promote Relaxation)  Chamomile tea  Valerian root tea  Passionflower tea    Foods to Avoid Before Bed  ? Caffeine (coffee, tea, chocolate, soda)  ? Alcohol (disrupts sleep cycles)  ? Spicy or acidic foods (can cause reflux)  ? High-sugar snacks (blood sugar crashes)  ? Processed or greasy foods (hard to digest)      Online/Book Resources for Insomnia   Youtube Sleep  [facility]  There are some on-line resources that can be of help.  Some of which will require a fee.    Http://www.veterant[employer].va.gov/apps/insomnia/index.html#dashboard (FREE)  Http://[employer]/cbt-online-insomnia-treatment.html  Http://www.[employer]/    Some apps that have helped people sleep:  Insomnia  (FREE)  Calm  [facility]BT[facility]   Go! To Sleep by the [place]    You may consider reading \"No More Sleepless Nights\" by Dr. Mauro Taylor and Dr. Dorinda Tinajero, \"Say Good Night to Insomnia\" by Dr. Alton Car, \"Quiet Your Mind and Get to Sleep\" by Debra.   These books can help you to fall asleep without the use of medications over time.       "

## 2025-06-26 NOTE — ASSESSMENT & PLAN NOTE
She has tried medications in the past such as Lexapro  I recommend ongoing follow-up with her PCP and perhaps referral to psychiatry and psychology in the future to manage anxiety especially since she has had panic attack like symptoms that require her to go to the ER with chest pain

## 2025-06-26 NOTE — PROGRESS NOTES
Name: Samantha Jaramillo      : 1990      MRN: [id number]  Encounter Provider: Darci Garcia MD  Encounter Date: 2025   Encounter department: [facility]  :  Assessment & Plan  Insomnia, unspecified type  Very significant history of psychophysiologic insomnia which I think is contributed by anxiety.  This insomnia severely affects quality of life and has affected jobs in the past.  I think this is also associated with a delayed circadian rhythm    She has tried many medications in the past.  Quetiapine was not effective.  Ambien worked initially but she build up a tolerance to it.  Mirtazapine in the past was ineffective.  Melatonin is not effective for sleep initiation.  She has used over-the-counter medications including ZzzQuil frequently in the past but she does not want to become dependent on medications.    -Currently using Lunesta which I recommended that she continue while we work on other factors of insomnia.  Lunesta at least  increases her sleep duration by 2 to 3 hours  - Recommend prioritized referral to CBT-I  - Discussed her daily routine, I recommend that she does more activities outside of the bedroom during the day so that she can associate her bed and bedroom more with sleep.  I recommend more routine as far as mealtimes and exercise.  I recommend light exercise every day.  I recommend light exposure in the morning.  Consistent wake up times which she is already doing.  -Included more strategies, recommendations as far as diet, habits, stimulus control and online resources in the AVS  - Hesitant to add any medications at this time in addition to Lunesta due to prior frustrations with medications  Orders:    Ambulatory referral to Psych Services; Future    Circadian rhythm sleep disorder  She has a markedly delayed circadian rhythm which has been the case since she was a teenager.    - Should consistently acquire morning sunlight  - I recommend melatonin low-dose  0.3 to 1 mg at least 4 to 5 hours before bedtime  - Currently not working but I do recommend consistent wake up times in the morning       Anxiety  She has tried medications in the past such as Lexapro  I recommend ongoing follow-up with her PCP and perhaps referral to psychiatry and psychology in the future to manage anxiety especially since she has had panic attack like symptoms that require her to go to the ER with chest pain       Iron deficiency  She does have iron deficiency with very low ferritin in the past.  She does not exhibit obvious signs of restless legs.  I am  unsure of the relationship of her hypoferritinemia with insomnia.             History of Present Illness   HPI   34 year old female with generalized anxiety disorder, possible panic attacks, iron deficiency, presents for follow-up of insomnia     6/26/25 - Follow up - she continues to be very frustrated with her insomnia.  She is very fearful that she is not able to ever fall asleep or consistently fall asleep.  She usually go to bed around 2 to 3 AM because she does not feel sleepy until that time.  Even though she is tired when she goes to bed she is not able to fall asleep.  With Lunesta she is able to get 2 to 4 hours of sleep but usually will wake up around 6 to 7 AM unable to fall back asleep.  She continues to have issues with sleep maintenance.  She does not remember the last time she had more than 6 hours of sleep.  It is difficult to function and she has had to quit jobs in the past due to her insomnia.  She felt like she slept well as a child but developed issues in the teenage years and became more of a night owl.  She tries to avoid naps but during the day if she is laying down she may doze off occasionally but does not acquire consistent sleep.  She has never been in CBT-I.  She does not want to be dependent on medication and is worried that she will become dependent on Lunesta or she will build up tolerance.  She had used Ambien  "in the past but this lost effectiveness.  She has used over-the-counter ZzzQuil at higher doses than recommended but she does not want to do this.  Daily routine since she is not working and spending time at home mainly in her  bedroom since she lives with her parents and brother.  She does a lot of activities and plays games in her room.  She does not achieve regular exercise.  Her mealtimes are inconsistent and a lot of times she does not feel hungry and forgets to eat before bedtime.  She feels like her appetite is \"all over the place\".  She has had multiple ED visits in the past for chest pain which does not have demonstrated cardiac etiology and has been attributed to panic attacks.    3/10/25 - Follow up ([facility]) - Since her last visit, Ms. Jaramillo has not been sleeping as well.  Was suggested to try Lexapro for anxiety.  Helps a little but may be worsening her insomnia.  No longer taking trazodone as that lost its effect and she was not sleeping any better on that medication. Insomnia worsened despite a Rx for Lunesta at home            Sitting and reading: (Patient-Rptd) (P) Would never doze  Watching TV: (Patient-Rptd) (P) Slight chance of dozing  Sitting, inactive in a public place (e.g. a theatre or a meeting): (Patient-Rptd) (P) Would never doze  As a passenger in a car for an hour without a break: (Patient-Rptd) (P) Would never doze  Lying down to rest in the afternoon when circumstances permit: (Patient-Rptd) (P) Slight chance of dozing  Sitting and talking to someone: (Patient-Rptd) (P) Would never doze  Sitting quietly after a lunch without alcohol: (Patient-Rptd) (P) Would never doze  In a car, while stopped for a few minutes in traffic: (Patient-Rptd) (P) Would never doze  Total score: (Patient-Rptd) (P) 2     Review of Systems  Pertinent positives/negatives included in HPI and also as noted:     Past Medical History   Past Medical History[1]  Past Surgical History[2]  Family History[3]   reports that " she has never smoked. She has never been exposed to tobacco smoke. She has never used smokeless tobacco. She reports that she does not currently use alcohol. She reports that she does not use drugs.  Current Outpatient Medications   Medication Instructions    eszopiclone (LUNESTA) 2 mg, Oral, Daily at bedtime PRN, Take immediately before bedtime    ferrous sulfate 324 mg, Oral, 2 times daily before meals    rosuvastatin (CRESTOR) 10 mg, Oral, Daily   Allergies[4]   Objective   There were no vitals taken for this visit.       Physical Exam  Constitutional:       General: She is not in acute distress.     Appearance: Normal appearance. She is not ill-appearing, toxic-appearing or diaphoretic.     Eyes:      General: No scleral icterus.     Extraocular Movements: Extraocular movements intact.       Cardiovascular:      Rate and Rhythm: Normal rate.   Pulmonary:      Effort: Pulmonary effort is normal. No respiratory distress.      Breath sounds: Normal breath sounds.   Abdominal:      Tenderness: There is no guarding.     Musculoskeletal:         General: Normal range of motion.      Cervical back: Normal range of motion and neck supple. No rigidity.      Right lower leg: No edema.      Left lower leg: No edema.     Neurological:      General: No focal deficit present.      Mental Status: She is alert and oriented to person, place, and time.       Visit Vitals  OB Status Unknown   Smoking Status Never     Data  Lab Results   Component Value Date    HGB 14.1 04/14/2025    HCT 43.1 04/14/2025    MCV 93 04/14/2025      Lab Results   Component Value Date    CALCIUM 10.2 04/14/2025    K 3.8 04/14/2025    CO2 29 04/14/2025     04/14/2025    BUN 17 04/14/2025    CREATININE 0.69 04/14/2025     Lab Results   Component Value Date    IRON 91 02/07/2025    TIBC 392 02/07/2025    FERRITIN 15 02/07/2025     Lab Results   Component Value Date    AST 16 04/14/2025    ALT 8 04/14/2025       I have spent a total time of 45 minutes  in caring for this patient on the day of the visit/encounter including Diagnostic results, Prognosis, Risks and benefits of tx options, Instructions for management, Patient and family education, Risk factor reductions, Impressions, Counseling / Coordination of care, Documenting in the medical record, Reviewing/placing orders in the medical record (including tests, medications, and/or procedures), Obtaining or reviewing history  , and Communicating with other healthcare professionals .         [1]   Past Medical History:  Diagnosis Date    Anxiety     Iron deficiency     Migraine 1/31/25   [2]   Past Surgical History:  Procedure Laterality Date    N[place] LAPAROSCOPY W/RMVL ADNEXAL STRUCTURES Bilateral 9/1/2020    Procedure: LAPAROSCOPIC SALPINGECTOMY;  Surgeon: Jodi Moon DO;  Location: [facility];  Service: Gynecology    TUBAL LIGATION      WISDOM TOOTH EXTRACTION     [3]   Family History  Problem Relation Name Age of Onset    Hypertension Mother Julieth     Endometrial cancer Mother Julieth     Hyperlipidemia Mother Julieth     Stroke Mother Julieth     Cancer Mother Julieth     Transient ischemic attack Mother Julieth     Hypertension Father Pepe     Heart attack Father Pepe     Autism Brother Oh     Asthma Brother Oh     Other Paternal Grandmother          lymes    No Known Problems Paternal Grandfather      No Known Problems Half-Brother      No Known Problems Half-Brother     [4] No Known Allergies

## 2025-06-26 NOTE — ASSESSMENT & PLAN NOTE
Very significant history of psychophysiologic insomnia which I think is contributed by anxiety.  This insomnia severely affects quality of life and has affected jobs in the past.  I think this is also associated with a delayed circadian rhythm    She has tried many medications in the past.  Quetiapine was not effective.  Ambien worked initially but she build up a tolerance to it.  Mirtazapine in the past was ineffective.  Melatonin is not effective for sleep initiation.  She has used over-the-counter medications including ZzzQuil frequently in the past but she does not want to become dependent on medications.    -Currently using Lunesta which I recommended that she continue while we work on other factors of insomnia.  Lunesta at least  increases her sleep duration by 2 to 3 hours  - Recommend prioritized referral to CBT-I  - Discussed her daily routine, I recommend that she does more activities outside of the bedroom during the day so that she can associate her bed and bedroom more with sleep.  I recommend more routine as far as mealtimes and exercise.  I recommend light exercise every day.  I recommend light exposure in the morning.  Consistent wake up times which she is already doing.  -Included more strategies, recommendations as far as diet, habits, stimulus control and online resources in the AVS  - Hesitant to add any medications at this time in addition to Lunesta due to prior frustrations with medications  Orders:    Ambulatory referral to Psych Services; Future

## 2025-06-27 ENCOUNTER — TELEPHONE (OUTPATIENT)
Age: 35
End: 2025-06-27

## 2025-06-27 NOTE — TELEPHONE ENCOUNTER
[facility] Psych[facility]er[facility]  Per therapist and referred provider, please place pt on EPIC wait list for CBT-I therapy. Thanks.

## 2025-07-11 ENCOUNTER — OFFICE VISIT (OUTPATIENT)
Dept: FAMILY MEDICINE CLINIC | Facility: CLINIC | Age: 35
End: 2025-07-11
Payer: COMMERCIAL

## 2025-07-11 VITALS
BODY MASS INDEX: 20.77 KG/M2 | HEIGHT: 61 IN | OXYGEN SATURATION: 98 % | WEIGHT: 110 LBS | HEART RATE: 90 BPM | SYSTOLIC BLOOD PRESSURE: 124 MMHG | DIASTOLIC BLOOD PRESSURE: 80 MMHG

## 2025-07-11 DIAGNOSIS — F41.9 ANXIETY: ICD-10-CM

## 2025-07-11 DIAGNOSIS — B35.4 TINEA CORPORIS: ICD-10-CM

## 2025-07-11 DIAGNOSIS — E78.2 MIXED HYPERLIPIDEMIA: Primary | ICD-10-CM

## 2025-07-11 DIAGNOSIS — Z00.00 WELL ADULT EXAM: ICD-10-CM

## 2025-07-11 DIAGNOSIS — E61.1 IRON DEFICIENCY: ICD-10-CM

## 2025-07-11 DIAGNOSIS — E55.9 VITAMIN D DEFICIENCY: ICD-10-CM

## 2025-07-11 DIAGNOSIS — F51.04 PSYCHOPHYSIOLOGICAL INSOMNIA: ICD-10-CM

## 2025-07-11 PROCEDURE — 99214 OFFICE O/P EST MOD 30 MIN: CPT | Performed by: FAMILY MEDICINE

## 2025-07-11 PROCEDURE — 99395 PREV VISIT EST AGE 18-39: CPT | Performed by: FAMILY MEDICINE

## 2025-07-11 RX ORDER — CLOTRIMAZOLE AND BETAMETHASONE DIPROPIONATE 10; .64 MG/G; MG/G
CREAM TOPICAL 2 TIMES DAILY
Qty: 45 G | Refills: 0 | Status: SHIPPED | OUTPATIENT
Start: 2025-07-11 | End: 2025-08-01

## 2025-07-11 RX ORDER — FLUOXETINE 10 MG/1
10 CAPSULE ORAL DAILY
Qty: 30 CAPSULE | Refills: 2 | Status: SHIPPED | OUTPATIENT
Start: 2025-07-11

## 2025-07-11 NOTE — PROGRESS NOTES
Name: Samantha Jaramillo      : 1990      MRN: [id number]  Encounter Provider: Jonathan oJseph MD  Encounter Date: 2025   Encounter department: [facility]    :  Assessment & Plan  Mixed hyperlipidemia    Orders:    Lipid Panel with Direct LDL reflex; Future    Iron deficiency    Orders:    Iron Panel (Includes Ferritin, Iron Sat%, Iron, and TIBC); Future    Vitamin D deficiency    Orders:    Vitamin D 25 hydroxy; Future    Psychophysiological insomnia         Well adult exam         Anxiety    Orders:    FLUoxetine (PROzac) 10 mg capsule; Take 1 capsule (10 mg total) by mouth daily    Tinea corporis    Orders:    clotrimazole-betamethasone (LOTRISONE) 1-0.05 % cream; Apply topically 2 (two) times a day for 21 days      Assessment & Plan  1. Insomnia.  - Reports sleeping only 2 to 3 hours per day and is currently taking Lunesta.  - Advised to continue with Lunesta and consider cognitive behavioral therapy for sleep issues.  - Encouraged to explore free resources available online for sleep hygiene.  - Follow-up blood test ordered for 2025 to monitor her condition.    2. Iron deficiency.  - Ferritin levels remain low.  - Advised to resume iron supplementation.  - Follow-up blood test ordered for 2025 to monitor iron levels.  - Discussed the importance of regular eating habits to support circadian rhythm.    3. Hypercholesterolemia.  - Advised to continue taking rosuvastatin.  - Follow-up blood test ordered for 2025 to monitor cholesterol levels.  - Discussed genetic predisposition to high cholesterol and the importance of statin therapy.    4. Anxiety.  - Reports Lexapro and Zoloft were not effective and sometimes worsened symptoms.  - Prozac recommended as an alternative treatment option.  - Advised to send a message in 3 to 4 weeks to report progress with Prozac.  - Discussed the limitations of Xanax for long-term use due to its similarity to sleeping pills.    5. Rash on  the back of the neck.  - Rash could be indicative of ringworm or eczema.  - Prescription for clotrimazole and betamethasone cream provided, with instructions to apply twice daily for 21 days.  - Advised to take a picture of the rash for medical records.  - Follow-up visit scheduled in 3 months.           History of Present Illness     History of Present Illness  The patient presents for insomnia, iron deficiency, hypercholesterolemia, anxiety, and a rash on the back of the neck.    She has been experiencing insomnia and was referred to a sleep specialist. The specialist suggested cognitive behavioral therapy for sleep issues and recommended some free resources available online. She was informed that insomnia is challenging to diagnose due to its involvement with various neurotransmitters in the brain. She was also advised to consult a psychiatrist as anxiety could be contributing to her insomnia. Her appetite has been inconsistent over the past few months, with periods of no hunger and reluctance to eat, which she attributes to Lunesta leaving a bad taste in her mouth. She often stays in bed until 2 or 3 in the afternoon due to fatigue. She acknowledges the need to increase her food intake as she has been eating less recently. At the beginning of the year, she weighed 120 pounds, but after falling ill, she lost weight and now weighs 107 pounds. She has been taking Lunesta for her insomnia. She tried Lexapro again when it was first prescribed but felt it exacerbated her insomnia. However, she noticed a slight improvement when she stopped taking it. Currently, she sleeps for 2 to 3 hours per day.    She has not been consistent with her iron supplementation recently.    She has been taking rosuvastatin for her cholesterol management. She took it today and had also taken it a few weeks ago but forgot to continue. She mentions that her mother was advised to start statin therapy as her current medication was not  "effective.    She continues to experience random chest pains, which have been investigated but no cause has been found. Over the past few weeks, she has experienced pain on the right side of her chest, extending into her armpit, down her arm, and into her hand. Her parents have suggested applying for disability due to her prolonged absence from work, but she is unsure about returning to work.    She has had a small rash on the back of her neck for several years, which she initially thought was a heat rash. The rash sometimes clears up but has been persistent recently. It occasionally becomes itchy.    FAMILY HISTORY  - Mother has high cholesterol and was recommended to be on a statin     Review of Systems   Constitutional:  Negative for fever and unexpected weight change.   HENT:  Negative for nosebleeds and trouble swallowing.    Eyes:  Negative for visual disturbance.   Respiratory:  Negative for chest tightness and shortness of breath.    Cardiovascular:  Negative for chest pain, palpitations and leg swelling.   Gastrointestinal:  Negative for abdominal pain, constipation, diarrhea and nausea.   Endocrine: Negative for cold intolerance.   Genitourinary:  Negative for dysuria and urgency.   Musculoskeletal:  Negative for joint swelling and myalgias.   Skin:  Negative for rash.   Neurological:  Negative for tremors, seizures and syncope.   Hematological:  Does not bruise/bleed easily.   Psychiatric/Behavioral:  Negative for hallucinations and suicidal ideas.      Objective   /80   Pulse 90   Ht 5' 1\" (1.549 m)   Wt 49.9 kg (110 lb)   SpO2 98%   BMI 20.78 kg/m²     Physical Exam  - Neck: Small rash on the back of the neck, persistent for years, sometimes itchy  - Skin: Small rash on the back of the neck, persistent for years, sometimes itchy  Physical Exam  Vitals and nursing note reviewed.   Constitutional:       General: She is not in acute distress.     Appearance: She is well-developed.   HENT:      " Head: Normocephalic and atraumatic.     Eyes:      Conjunctiva/sclera: Conjunctivae normal.       Cardiovascular:      Rate and Rhythm: Normal rate and regular rhythm.      Heart sounds: No murmur heard.  Pulmonary:      Effort: Pulmonary effort is normal. No respiratory distress.      Breath sounds: Normal breath sounds.   Abdominal:      Palpations: Abdomen is soft.      Tenderness: There is no abdominal tenderness.     Musculoskeletal:         General: No swelling.      Cervical back: Neck supple.     Skin:     General: Skin is warm and dry.      Capillary Refill: Capillary refill takes less than 2 seconds.     Neurological:      Mental Status: She is alert.     Psychiatric:         Mood and Affect: Mood normal.

## 2025-08-07 DIAGNOSIS — F41.9 ANXIETY: ICD-10-CM

## 2025-08-08 RX ORDER — FLUOXETINE 10 MG/1
10 CAPSULE ORAL DAILY
Qty: 90 CAPSULE | Refills: 1 | Status: SHIPPED | OUTPATIENT
Start: 2025-08-08

## (undated) DEVICE — ADHESIVE SKIN HIGH VISCOSITY EXOFIN 1ML

## (undated) DEVICE — MAYO STAND COVER: Brand: CONVERTORS

## (undated) DEVICE — TROCAR: Brand: KII FIOS FIRST ENTRY

## (undated) DEVICE — ENSEAL LAPAROSCOPIC TISSUE SEALER G2 CURVED JAW FOR USE WITH G2 GENERATOR 5MM DIAMETER 35CM SHAFT LENGTH: Brand: ENSEAL

## (undated) DEVICE — INSUFLATION TUBING INSUFLOW (LEXION)

## (undated) DEVICE — SYRINGE 10ML LL

## (undated) DEVICE — PACK PBDS MINOR GYN LAP RF

## (undated) DEVICE — GLOVE INDICATOR PI UNDERGLOVE SZ 7 BLUE

## (undated) DEVICE — GLOVE PI ULTRA TOUCH SZ.6.5

## (undated) DEVICE — CHLORAPREP HI-LITE 26ML ORANGE

## (undated) DEVICE — INTENDED FOR TISSUE SEPARATION, AND OTHER PROCEDURES THAT REQUIRE A SHARP SURGICAL BLADE TO PUNCTURE OR CUT.: Brand: BARD-PARKER SAFETY BLADES SIZE 11, STERILE

## (undated) DEVICE — ANTI-FOG SOLUTION WITH FOAM PAD: Brand: DEVON

## (undated) DEVICE — TRAY FOLEY 16FR URIMETER SURESTEP

## (undated) DEVICE — TROCAR: Brand: KII® SLEEVE